# Patient Record
Sex: FEMALE | Race: BLACK OR AFRICAN AMERICAN | NOT HISPANIC OR LATINO | URBAN - METROPOLITAN AREA
[De-identification: names, ages, dates, MRNs, and addresses within clinical notes are randomized per-mention and may not be internally consistent; named-entity substitution may affect disease eponyms.]

---

## 2017-02-15 ENCOUNTER — INPATIENT (INPATIENT)
Facility: HOSPITAL | Age: 52
LOS: 1 days | Discharge: HOME | End: 2017-02-17
Attending: INTERNAL MEDICINE | Admitting: INTERNAL MEDICINE
Payer: MEDICAID

## 2017-02-15 VITALS
HEART RATE: 87 BPM | RESPIRATION RATE: 16 BRPM | TEMPERATURE: 99 F | DIASTOLIC BLOOD PRESSURE: 109 MMHG | OXYGEN SATURATION: 99 % | SYSTOLIC BLOOD PRESSURE: 200 MMHG

## 2017-02-15 DIAGNOSIS — R07.9 CHEST PAIN, UNSPECIFIED: ICD-10-CM

## 2017-02-15 DIAGNOSIS — M54.9 DORSALGIA, UNSPECIFIED: ICD-10-CM

## 2017-02-15 DIAGNOSIS — I10 ESSENTIAL (PRIMARY) HYPERTENSION: ICD-10-CM

## 2017-02-15 DIAGNOSIS — Z29.9 ENCOUNTER FOR PROPHYLACTIC MEASURES, UNSPECIFIED: ICD-10-CM

## 2017-02-15 DIAGNOSIS — E11.638 TYPE 2 DIABETES MELLITUS WITH OTHER ORAL COMPLICATIONS: ICD-10-CM

## 2017-02-15 DIAGNOSIS — E78.5 HYPERLIPIDEMIA, UNSPECIFIED: ICD-10-CM

## 2017-02-15 DIAGNOSIS — Z98.89 OTHER SPECIFIED POSTPROCEDURAL STATES: Chronic | ICD-10-CM

## 2017-02-15 LAB
ALBUMIN SERPL ELPH-MCNC: 3.5 G/DL — SIGNIFICANT CHANGE UP (ref 3.3–5)
ALP SERPL-CCNC: 82 U/L — SIGNIFICANT CHANGE UP (ref 40–120)
ALT FLD-CCNC: 25 U/L — SIGNIFICANT CHANGE UP (ref 4–33)
APPEARANCE UR: CLEAR — SIGNIFICANT CHANGE UP
AST SERPL-CCNC: 28 U/L — SIGNIFICANT CHANGE UP (ref 4–32)
BACTERIA # UR AUTO: SIGNIFICANT CHANGE UP
BASOPHILS # BLD AUTO: 0.05 K/UL — SIGNIFICANT CHANGE UP (ref 0–0.2)
BASOPHILS NFR BLD AUTO: 0.7 % — SIGNIFICANT CHANGE UP (ref 0–2)
BILIRUB SERPL-MCNC: 0.3 MG/DL — SIGNIFICANT CHANGE UP (ref 0.2–1.2)
BILIRUB UR-MCNC: NEGATIVE — SIGNIFICANT CHANGE UP
BLOOD UR QL VISUAL: NEGATIVE — SIGNIFICANT CHANGE UP
BUN SERPL-MCNC: 9 MG/DL — SIGNIFICANT CHANGE UP (ref 7–23)
CALCIUM SERPL-MCNC: 9 MG/DL — SIGNIFICANT CHANGE UP (ref 8.4–10.5)
CHLORIDE SERPL-SCNC: 98 MMOL/L — SIGNIFICANT CHANGE UP (ref 98–107)
CK MB BLD-MCNC: 1.38 NG/ML — SIGNIFICANT CHANGE UP (ref 1–4.7)
CK MB BLD-MCNC: SIGNIFICANT CHANGE UP (ref 0–2.5)
CK SERPL-CCNC: 54 U/L — SIGNIFICANT CHANGE UP (ref 25–170)
CO2 SERPL-SCNC: 23 MMOL/L — SIGNIFICANT CHANGE UP (ref 22–31)
COLOR SPEC: SIGNIFICANT CHANGE UP
CREAT SERPL-MCNC: 0.65 MG/DL — SIGNIFICANT CHANGE UP (ref 0.5–1.3)
EOSINOPHIL # BLD AUTO: 0.25 K/UL — SIGNIFICANT CHANGE UP (ref 0–0.5)
EOSINOPHIL NFR BLD AUTO: 3.3 % — SIGNIFICANT CHANGE UP (ref 0–6)
GLUCOSE SERPL-MCNC: 326 MG/DL — HIGH (ref 70–99)
GLUCOSE UR-MCNC: >1000 — SIGNIFICANT CHANGE UP
HCT VFR BLD CALC: 39.7 % — SIGNIFICANT CHANGE UP (ref 34.5–45)
HGB BLD-MCNC: 12.5 G/DL — SIGNIFICANT CHANGE UP (ref 11.5–15.5)
IMM GRANULOCYTES NFR BLD AUTO: 0.3 % — SIGNIFICANT CHANGE UP (ref 0–1.5)
KETONES UR-MCNC: NEGATIVE — SIGNIFICANT CHANGE UP
LEUKOCYTE ESTERASE UR-ACNC: HIGH
LYMPHOCYTES # BLD AUTO: 1.49 K/UL — SIGNIFICANT CHANGE UP (ref 1–3.3)
LYMPHOCYTES # BLD AUTO: 19.8 % — SIGNIFICANT CHANGE UP (ref 13–44)
MCHC RBC-ENTMCNC: 23.5 PG — LOW (ref 27–34)
MCHC RBC-ENTMCNC: 31.5 % — LOW (ref 32–36)
MCV RBC AUTO: 74.6 FL — LOW (ref 80–100)
MONOCYTES # BLD AUTO: 0.38 K/UL — SIGNIFICANT CHANGE UP (ref 0–0.9)
MONOCYTES NFR BLD AUTO: 5 % — SIGNIFICANT CHANGE UP (ref 2–14)
NEUTROPHILS # BLD AUTO: 5.35 K/UL — SIGNIFICANT CHANGE UP (ref 1.8–7.4)
NEUTROPHILS NFR BLD AUTO: 70.9 % — SIGNIFICANT CHANGE UP (ref 43–77)
NITRITE UR-MCNC: NEGATIVE — SIGNIFICANT CHANGE UP
NT-PROBNP SERPL-SCNC: 219.8 PG/ML — SIGNIFICANT CHANGE UP
PH UR: 6 — SIGNIFICANT CHANGE UP (ref 4.6–8)
PLATELET # BLD AUTO: 278 K/UL — SIGNIFICANT CHANGE UP (ref 150–400)
PMV BLD: 10.5 FL — SIGNIFICANT CHANGE UP (ref 7–13)
POTASSIUM SERPL-MCNC: 4.4 MMOL/L — SIGNIFICANT CHANGE UP (ref 3.5–5.3)
POTASSIUM SERPL-SCNC: 4.4 MMOL/L — SIGNIFICANT CHANGE UP (ref 3.5–5.3)
PROT SERPL-MCNC: 6.7 G/DL — SIGNIFICANT CHANGE UP (ref 6–8.3)
PROT UR-MCNC: NEGATIVE — SIGNIFICANT CHANGE UP
RBC # BLD: 5.32 M/UL — HIGH (ref 3.8–5.2)
RBC # FLD: 15.2 % — HIGH (ref 10.3–14.5)
RBC CASTS # UR COMP ASSIST: SIGNIFICANT CHANGE UP (ref 0–?)
SODIUM SERPL-SCNC: 136 MMOL/L — SIGNIFICANT CHANGE UP (ref 135–145)
SP GR SPEC: 1.03 — HIGH (ref 1–1.03)
SQUAMOUS # UR AUTO: SIGNIFICANT CHANGE UP
TROPONIN T SERPL-MCNC: < 0.06 NG/ML — SIGNIFICANT CHANGE UP (ref 0–0.06)
UROBILINOGEN FLD QL: NORMAL E.U. — SIGNIFICANT CHANGE UP (ref 0.1–0.2)
WBC # BLD: 7.54 K/UL — SIGNIFICANT CHANGE UP (ref 3.8–10.5)
WBC # FLD AUTO: 7.54 K/UL — SIGNIFICANT CHANGE UP (ref 3.8–10.5)
WBC UR QL: SIGNIFICANT CHANGE UP (ref 0–?)

## 2017-02-15 PROCEDURE — 71010: CPT | Mod: 26

## 2017-02-15 RX ORDER — LISINOPRIL 2.5 MG/1
40 TABLET ORAL DAILY
Qty: 0 | Refills: 0 | Status: DISCONTINUED | OUTPATIENT
Start: 2017-02-15 | End: 2017-02-17

## 2017-02-15 RX ORDER — LABETALOL HCL 100 MG
20 TABLET ORAL ONCE
Qty: 0 | Refills: 0 | Status: COMPLETED | OUTPATIENT
Start: 2017-02-15 | End: 2017-02-15

## 2017-02-15 RX ORDER — INSULIN LISPRO 100/ML
VIAL (ML) SUBCUTANEOUS AT BEDTIME
Qty: 0 | Refills: 0 | Status: DISCONTINUED | OUTPATIENT
Start: 2017-02-15 | End: 2017-02-17

## 2017-02-15 RX ORDER — DEXTROSE 50 % IN WATER 50 %
25 SYRINGE (ML) INTRAVENOUS ONCE
Qty: 0 | Refills: 0 | Status: DISCONTINUED | OUTPATIENT
Start: 2017-02-15 | End: 2017-02-17

## 2017-02-15 RX ORDER — ATORVASTATIN CALCIUM 80 MG/1
20 TABLET, FILM COATED ORAL AT BEDTIME
Qty: 0 | Refills: 0 | Status: DISCONTINUED | OUTPATIENT
Start: 2017-02-15 | End: 2017-02-17

## 2017-02-15 RX ORDER — INSULIN GLARGINE 100 [IU]/ML
16 INJECTION, SOLUTION SUBCUTANEOUS AT BEDTIME
Qty: 0 | Refills: 0 | Status: DISCONTINUED | OUTPATIENT
Start: 2017-02-15 | End: 2017-02-17

## 2017-02-15 RX ORDER — LABETALOL HCL 100 MG
600 TABLET ORAL
Qty: 0 | Refills: 0 | Status: DISCONTINUED | OUTPATIENT
Start: 2017-02-15 | End: 2017-02-16

## 2017-02-15 RX ORDER — HYDRALAZINE HCL 50 MG
10 TABLET ORAL ONCE
Qty: 0 | Refills: 0 | Status: COMPLETED | OUTPATIENT
Start: 2017-02-15 | End: 2017-02-15

## 2017-02-15 RX ORDER — SODIUM CHLORIDE 9 MG/ML
1000 INJECTION, SOLUTION INTRAVENOUS
Qty: 0 | Refills: 0 | Status: DISCONTINUED | OUTPATIENT
Start: 2017-02-15 | End: 2017-02-17

## 2017-02-15 RX ORDER — ASPIRIN/CALCIUM CARB/MAGNESIUM 324 MG
81 TABLET ORAL DAILY
Qty: 0 | Refills: 0 | Status: DISCONTINUED | OUTPATIENT
Start: 2017-02-15 | End: 2017-02-17

## 2017-02-15 RX ORDER — ENOXAPARIN SODIUM 100 MG/ML
40 INJECTION SUBCUTANEOUS EVERY 24 HOURS
Qty: 0 | Refills: 0 | Status: DISCONTINUED | OUTPATIENT
Start: 2017-02-15 | End: 2017-02-17

## 2017-02-15 RX ORDER — DEXTROSE 50 % IN WATER 50 %
12.5 SYRINGE (ML) INTRAVENOUS ONCE
Qty: 0 | Refills: 0 | Status: DISCONTINUED | OUTPATIENT
Start: 2017-02-15 | End: 2017-02-17

## 2017-02-15 RX ORDER — INSULIN LISPRO 100/ML
VIAL (ML) SUBCUTANEOUS
Qty: 0 | Refills: 0 | Status: DISCONTINUED | OUTPATIENT
Start: 2017-02-15 | End: 2017-02-17

## 2017-02-15 RX ORDER — HYDRALAZINE HCL 50 MG
100 TABLET ORAL THREE TIMES A DAY
Qty: 0 | Refills: 0 | Status: DISCONTINUED | OUTPATIENT
Start: 2017-02-15 | End: 2017-02-17

## 2017-02-15 RX ORDER — GLUCAGON INJECTION, SOLUTION 0.5 MG/.1ML
1 INJECTION, SOLUTION SUBCUTANEOUS ONCE
Qty: 0 | Refills: 0 | Status: DISCONTINUED | OUTPATIENT
Start: 2017-02-15 | End: 2017-02-17

## 2017-02-15 RX ORDER — ACETAMINOPHEN 500 MG
650 TABLET ORAL EVERY 6 HOURS
Qty: 0 | Refills: 0 | Status: DISCONTINUED | OUTPATIENT
Start: 2017-02-15 | End: 2017-02-17

## 2017-02-15 RX ORDER — DEXTROSE 50 % IN WATER 50 %
1 SYRINGE (ML) INTRAVENOUS ONCE
Qty: 0 | Refills: 0 | Status: DISCONTINUED | OUTPATIENT
Start: 2017-02-15 | End: 2017-02-17

## 2017-02-15 RX ADMIN — Medication 20 MILLIGRAM(S): at 19:51

## 2017-02-15 RX ADMIN — Medication 600 MILLIGRAM(S): at 23:03

## 2017-02-15 RX ADMIN — ATORVASTATIN CALCIUM 20 MILLIGRAM(S): 80 TABLET, FILM COATED ORAL at 23:02

## 2017-02-15 RX ADMIN — Medication 0.1 MILLIGRAM(S): at 23:02

## 2017-02-15 RX ADMIN — Medication 10 MILLIGRAM(S): at 20:57

## 2017-02-15 RX ADMIN — INSULIN GLARGINE 16 UNIT(S): 100 INJECTION, SOLUTION SUBCUTANEOUS at 23:22

## 2017-02-15 RX ADMIN — Medication 20 MILLIGRAM(S): at 20:30

## 2017-02-15 RX ADMIN — LISINOPRIL 40 MILLIGRAM(S): 2.5 TABLET ORAL at 23:02

## 2017-02-15 RX ADMIN — Medication 81 MILLIGRAM(S): at 23:07

## 2017-02-15 RX ADMIN — Medication 100 MILLIGRAM(S): at 23:02

## 2017-02-15 RX ADMIN — ENOXAPARIN SODIUM 40 MILLIGRAM(S): 100 INJECTION SUBCUTANEOUS at 23:03

## 2017-02-15 NOTE — H&P ADULT. - ATTENDING COMMENTS
Pt seen and examined at bedside. Agree with assessment and plan as above  Admitted with hypertensive emergency  BP control with home meds and PRN IV  Tele monitoring   Insulin   DVT ppx

## 2017-02-15 NOTE — ED PROVIDER NOTE - ATTENDING CONTRIBUTION TO CARE
DR Bloch- Patient sent in due to high BP, had CP yesterday, c/o some dyspnea on exertion, , /126- HEENT nml 2-12 intact,heart sounds nml, lungs clear, mildy obese, no abd tenderness, no edema- BP control

## 2017-02-15 NOTE — ED PROVIDER NOTE - MEDICAL DECISION MAKING DETAILS
50 y/o female with HTN and HLD here with worse HTN. Consider ACS vs HTN emergency vs Systolic/Disatolic BP. Plan CBC, CMP, CXR, EKG, Labetalol IV, Reassess. Admit

## 2017-02-15 NOTE — ED PROVIDER NOTE - FAMILY HISTORY
<<-----Click on this checkbox to enter Family History Family history of coronary artery disease     Family history of MI (myocardial infarction)     Sibling  Still living? Unknown  Family history of hypertension, Age at diagnosis: Age Unknown  Family history of diabetes mellitus, Age at diagnosis: Age Unknown

## 2017-02-15 NOTE — H&P ADULT. - NEGATIVE NEUROLOGICAL SYMPTOMS
no focal seizures/no syncope/no weakness/no paresthesias/no transient paralysis/no generalized seizures

## 2017-02-15 NOTE — H&P ADULT. - HISTORY OF PRESENT ILLNESS
51F obese with a history of HTN, Dyslipidemia, DM2 saw her PCP, Dr Rivera on 2/15,and was found to have a BP = 207/116. The pt has experienced SANDERSON for he past few weeks.  Nonexertional, intermittent chest tightness to the L side.  Denies fever, cough, HA, visual changes, LE edema, trauma, abd pain. She denies dietary indiscretions and has been compliant with her medications.     In the Ed, she was given a total of Labetalol 40 mg IV and her recent BP = 226/ 118, HR = 79b/ min ,RR= 18b/ min   She was given Hydralazine 10 mg IV X 1

## 2017-02-15 NOTE — ED PROVIDER NOTE - OBJECTIVE STATEMENT
50 y/o female with PMH of HTN, HLD and DM here with worse high BP. Patient saw Dr Rivera today and he sent her here due to SBP of 210 in the office. Patient also reports SANDERSON for he past few weeks. Endorses CP yesterday on the L side. Denies fever, cough, worse CP with exertion, HA, visual changes, LE edema, trauma, abd pain or hx of DVT.

## 2017-02-15 NOTE — H&P ADULT. - NEGATIVE ENMT SYMPTOMS
no nose bleeds/no vertigo/no sinus symptoms/no gum bleeding/no abnormal taste sensation/no hearing difficulty/no tinnitus/no ear pain

## 2017-02-15 NOTE — ED ADULT NURSE NOTE - OBJECTIVE STATEMENT
elevated BP from cardiology visit today.  Mild pressure on exertion reported.  Repeat /101.  Pt denies headache, vision change, dizziness, chest pain.   IV accessed.  Labs sent.  Waiting for MD lloyd.

## 2017-02-15 NOTE — ED ADULT TRIAGE NOTE - CHIEF COMPLAINT QUOTE
Pt sent from cardiologist due to high blood pressure 207/116. Pt reports chest pain yesterday and SANDERSON. Resp even and unlabored. Pt she did not take her meds during January due to an insurance issue, but restarted 2/5, denies dizziness or SOB at rest.

## 2017-02-16 ENCOUNTER — TRANSCRIPTION ENCOUNTER (OUTPATIENT)
Age: 52
End: 2017-02-16

## 2017-02-16 LAB
BUN SERPL-MCNC: 10 MG/DL — SIGNIFICANT CHANGE UP (ref 7–23)
CALCIUM SERPL-MCNC: 9 MG/DL — SIGNIFICANT CHANGE UP (ref 8.4–10.5)
CHLORIDE SERPL-SCNC: 101 MMOL/L — SIGNIFICANT CHANGE UP (ref 98–107)
CHOLEST SERPL-MCNC: 178 MG/DL — SIGNIFICANT CHANGE UP (ref 120–199)
CK SERPL-CCNC: 35 U/L — SIGNIFICANT CHANGE UP (ref 25–170)
CO2 SERPL-SCNC: 24 MMOL/L — SIGNIFICANT CHANGE UP (ref 22–31)
CREAT SERPL-MCNC: 0.57 MG/DL — SIGNIFICANT CHANGE UP (ref 0.5–1.3)
GLUCOSE SERPL-MCNC: 218 MG/DL — HIGH (ref 70–99)
HBA1C BLD-MCNC: 10.7 % — HIGH (ref 4–5.6)
HCG SERPL-ACNC: < 5 MIU/ML — SIGNIFICANT CHANGE UP
HCT VFR BLD CALC: 38.9 % — SIGNIFICANT CHANGE UP (ref 34.5–45)
HDLC SERPL-MCNC: 44 MG/DL — LOW (ref 45–65)
HGB BLD-MCNC: 12.3 G/DL — SIGNIFICANT CHANGE UP (ref 11.5–15.5)
LIPID PNL WITH DIRECT LDL SERPL: 119 MG/DL — SIGNIFICANT CHANGE UP
MCHC RBC-ENTMCNC: 23.7 PG — LOW (ref 27–34)
MCHC RBC-ENTMCNC: 31.6 % — LOW (ref 32–36)
MCV RBC AUTO: 74.8 FL — LOW (ref 80–100)
PLATELET # BLD AUTO: 287 K/UL — SIGNIFICANT CHANGE UP (ref 150–400)
PMV BLD: 10.7 FL — SIGNIFICANT CHANGE UP (ref 7–13)
POTASSIUM SERPL-MCNC: 4.1 MMOL/L — SIGNIFICANT CHANGE UP (ref 3.5–5.3)
POTASSIUM SERPL-SCNC: 4.1 MMOL/L — SIGNIFICANT CHANGE UP (ref 3.5–5.3)
RBC # BLD: 5.2 M/UL — SIGNIFICANT CHANGE UP (ref 3.8–5.2)
RBC # FLD: 15.6 % — HIGH (ref 10.3–14.5)
SODIUM SERPL-SCNC: 138 MMOL/L — SIGNIFICANT CHANGE UP (ref 135–145)
TRIGL SERPL-MCNC: 124 MG/DL — SIGNIFICANT CHANGE UP (ref 10–149)
TROPONIN T SERPL-MCNC: < 0.06 NG/ML — SIGNIFICANT CHANGE UP (ref 0–0.06)
TSH SERPL-MCNC: 1.88 UIU/ML — SIGNIFICANT CHANGE UP (ref 0.27–4.2)
WBC # BLD: 6.96 K/UL — SIGNIFICANT CHANGE UP (ref 3.8–10.5)
WBC # FLD AUTO: 6.96 K/UL — SIGNIFICANT CHANGE UP (ref 3.8–10.5)

## 2017-02-16 RX ORDER — LABETALOL HCL 100 MG
600 TABLET ORAL THREE TIMES A DAY
Qty: 0 | Refills: 0 | Status: DISCONTINUED | OUTPATIENT
Start: 2017-02-16 | End: 2017-02-17

## 2017-02-16 RX ORDER — LABETALOL HCL 100 MG
600 TABLET ORAL THREE TIMES A DAY
Qty: 0 | Refills: 0 | Status: DISCONTINUED | OUTPATIENT
Start: 2017-02-16 | End: 2017-02-16

## 2017-02-16 RX ADMIN — Medication 100 MILLIGRAM(S): at 22:24

## 2017-02-16 RX ADMIN — INSULIN GLARGINE 16 UNIT(S): 100 INJECTION, SOLUTION SUBCUTANEOUS at 22:27

## 2017-02-16 RX ADMIN — Medication 600 MILLIGRAM(S): at 15:11

## 2017-02-16 RX ADMIN — Medication 81 MILLIGRAM(S): at 15:11

## 2017-02-16 RX ADMIN — ATORVASTATIN CALCIUM 20 MILLIGRAM(S): 80 TABLET, FILM COATED ORAL at 22:24

## 2017-02-16 RX ADMIN — ENOXAPARIN SODIUM 40 MILLIGRAM(S): 100 INJECTION SUBCUTANEOUS at 22:24

## 2017-02-16 RX ADMIN — Medication 0.2 MILLIGRAM(S): at 17:25

## 2017-02-16 RX ADMIN — Medication 600 MILLIGRAM(S): at 22:24

## 2017-02-16 RX ADMIN — Medication 0.1 MILLIGRAM(S): at 06:15

## 2017-02-16 RX ADMIN — Medication: at 14:20

## 2017-02-16 RX ADMIN — Medication 100 MILLIGRAM(S): at 15:11

## 2017-02-16 RX ADMIN — LISINOPRIL 40 MILLIGRAM(S): 2.5 TABLET ORAL at 06:15

## 2017-02-16 RX ADMIN — Medication 600 MILLIGRAM(S): at 06:15

## 2017-02-16 RX ADMIN — Medication: at 17:26

## 2017-02-16 RX ADMIN — Medication 100 MILLIGRAM(S): at 06:15

## 2017-02-17 VITALS
OXYGEN SATURATION: 100 % | DIASTOLIC BLOOD PRESSURE: 82 MMHG | RESPIRATION RATE: 18 BRPM | HEART RATE: 63 BPM | SYSTOLIC BLOOD PRESSURE: 158 MMHG

## 2017-02-17 LAB
BASOPHILS # BLD AUTO: 0.03 K/UL — SIGNIFICANT CHANGE UP (ref 0–0.2)
BASOPHILS NFR BLD AUTO: 0.4 % — SIGNIFICANT CHANGE UP (ref 0–2)
BUN SERPL-MCNC: 9 MG/DL — SIGNIFICANT CHANGE UP (ref 7–23)
CALCIUM SERPL-MCNC: 8.6 MG/DL — SIGNIFICANT CHANGE UP (ref 8.4–10.5)
CHLORIDE SERPL-SCNC: 101 MMOL/L — SIGNIFICANT CHANGE UP (ref 98–107)
CO2 SERPL-SCNC: 20 MMOL/L — LOW (ref 22–31)
CREAT SERPL-MCNC: 0.64 MG/DL — SIGNIFICANT CHANGE UP (ref 0.5–1.3)
EOSINOPHIL # BLD AUTO: 0.27 K/UL — SIGNIFICANT CHANGE UP (ref 0–0.5)
EOSINOPHIL NFR BLD AUTO: 3.6 % — SIGNIFICANT CHANGE UP (ref 0–6)
GLUCOSE SERPL-MCNC: 154 MG/DL — HIGH (ref 70–99)
HCT VFR BLD CALC: 37.6 % — SIGNIFICANT CHANGE UP (ref 34.5–45)
HGB BLD-MCNC: 11.7 G/DL — SIGNIFICANT CHANGE UP (ref 11.5–15.5)
IMM GRANULOCYTES NFR BLD AUTO: 0.1 % — SIGNIFICANT CHANGE UP (ref 0–1.5)
LYMPHOCYTES # BLD AUTO: 1.81 K/UL — SIGNIFICANT CHANGE UP (ref 1–3.3)
LYMPHOCYTES # BLD AUTO: 24.4 % — SIGNIFICANT CHANGE UP (ref 13–44)
MAGNESIUM SERPL-MCNC: 1.7 MG/DL — SIGNIFICANT CHANGE UP (ref 1.6–2.6)
MCHC RBC-ENTMCNC: 23.1 PG — LOW (ref 27–34)
MCHC RBC-ENTMCNC: 31.1 % — LOW (ref 32–36)
MCV RBC AUTO: 74.2 FL — LOW (ref 80–100)
MONOCYTES # BLD AUTO: 0.57 K/UL — SIGNIFICANT CHANGE UP (ref 0–0.9)
MONOCYTES NFR BLD AUTO: 7.7 % — SIGNIFICANT CHANGE UP (ref 2–14)
NEUTROPHILS # BLD AUTO: 4.72 K/UL — SIGNIFICANT CHANGE UP (ref 1.8–7.4)
NEUTROPHILS NFR BLD AUTO: 63.8 % — SIGNIFICANT CHANGE UP (ref 43–77)
PLATELET # BLD AUTO: 256 K/UL — SIGNIFICANT CHANGE UP (ref 150–400)
PMV BLD: 10.1 FL — SIGNIFICANT CHANGE UP (ref 7–13)
POTASSIUM SERPL-MCNC: 4 MMOL/L — SIGNIFICANT CHANGE UP (ref 3.5–5.3)
POTASSIUM SERPL-SCNC: 4 MMOL/L — SIGNIFICANT CHANGE UP (ref 3.5–5.3)
RBC # BLD: 5.07 M/UL — SIGNIFICANT CHANGE UP (ref 3.8–5.2)
RBC # FLD: 15.6 % — HIGH (ref 10.3–14.5)
SODIUM SERPL-SCNC: 136 MMOL/L — SIGNIFICANT CHANGE UP (ref 135–145)
WBC # BLD: 7.41 K/UL — SIGNIFICANT CHANGE UP (ref 3.8–10.5)
WBC # FLD AUTO: 7.41 K/UL — SIGNIFICANT CHANGE UP (ref 3.8–10.5)

## 2017-02-17 RX ORDER — HYDRALAZINE HCL 50 MG
1 TABLET ORAL
Qty: 0 | Refills: 0 | COMMUNITY
Start: 2017-02-17

## 2017-02-17 RX ORDER — ASPIRIN/CALCIUM CARB/MAGNESIUM 324 MG
1 TABLET ORAL
Qty: 0 | Refills: 0 | COMMUNITY
Start: 2017-02-17

## 2017-02-17 RX ORDER — ATORVASTATIN CALCIUM 80 MG/1
1 TABLET, FILM COATED ORAL
Qty: 0 | Refills: 0 | COMMUNITY
Start: 2017-02-17

## 2017-02-17 RX ORDER — LABETALOL HCL 100 MG
2 TABLET ORAL
Qty: 0 | Refills: 0 | COMMUNITY
Start: 2017-02-17

## 2017-02-17 RX ORDER — LABETALOL HCL 100 MG
600 TABLET ORAL
Qty: 0 | Refills: 0 | Status: DISCONTINUED | OUTPATIENT
Start: 2017-02-17 | End: 2017-02-17

## 2017-02-17 RX ORDER — LISINOPRIL 2.5 MG/1
1 TABLET ORAL
Qty: 0 | Refills: 0 | COMMUNITY
Start: 2017-02-17

## 2017-02-17 RX ORDER — ACETAMINOPHEN 500 MG
2 TABLET ORAL
Qty: 0 | Refills: 0 | COMMUNITY
Start: 2017-02-17

## 2017-02-17 RX ADMIN — Medication 0.2 MILLIGRAM(S): at 06:45

## 2017-02-17 RX ADMIN — Medication 600 MILLIGRAM(S): at 06:46

## 2017-02-17 RX ADMIN — Medication 81 MILLIGRAM(S): at 13:53

## 2017-02-17 RX ADMIN — Medication 100 MILLIGRAM(S): at 13:53

## 2017-02-17 RX ADMIN — Medication 4: at 12:55

## 2017-02-17 RX ADMIN — Medication: at 09:16

## 2017-02-17 RX ADMIN — LISINOPRIL 40 MILLIGRAM(S): 2.5 TABLET ORAL at 06:46

## 2017-02-17 RX ADMIN — Medication 100 MILLIGRAM(S): at 06:46

## 2017-02-17 NOTE — DISCHARGE NOTE ADULT - PLAN OF CARE
Reduce blood pressure. Continue Lisinopril, Hydralazine, Clonidine, Labetalol.   Please monitor your blood pressure.   Follow-up with Dr. Rivera within 1 week. Reduce lipid panel. Continue Lipitor. Reduce blood glucose. Continue Frankie Biswas.   Monitor your fingersticks.

## 2017-02-17 NOTE — DISCHARGE NOTE ADULT - CARE PROVIDER_API CALL
Irvin Rivera (), Cardiology; Internal Medicine  [address] [place] [address] [address]  [place] [address]  Phone: [phone]  Fax: [phone]

## 2017-02-17 NOTE — DISCHARGE NOTE ADULT - CARE PLAN
Principal Discharge DX:	HTN (hypertension)  Goal:	Reduce blood pressure.  Instructions for follow-up, activity and diet:	Continue Lisinopril, Hydralazine, Clonidine, Labetalol.   Please monitor your blood pressure.   Follow-up with Dr. Rivera within 1 week.  Secondary Diagnosis:	Hyperlipemia  Goal:	Reduce lipid panel.  Instructions for follow-up, activity and diet:	Continue Lipitor.  Secondary Diagnosis:	DM (diabetes mellitus)  Goal:	Reduce blood glucose.  Instructions for follow-up, activity and diet:	Continue Lantus, Janumet.   Monitor your fingersticks.

## 2017-02-17 NOTE — DISCHARGE NOTE ADULT - MEDICATION SUMMARY - MEDICATIONS TO TAKE
I will START or STAY ON the medications listed below when I get home from the hospital:    acetaminophen 325 mg oral tablet  -- 2 tab(s) by mouth every 6 hours, As needed, mild to moderate pain  -- Indication: For Pain    aspirin 81 mg oral tablet, chewable  -- 1 tab(s) by mouth once a day  -- Indication: For Heart     lisinopril 40 mg oral tablet  -- 1 tab(s) by mouth once a day  -- Indication: For HTN    cloNIDine 0.2 mg oral tablet  -- 1 tab(s) by mouth 2 times a day  -- Indication: For HTN    Janumet 1000 mg-50 mg oral tablet  -- 1 tab(s) by mouth 2 times a day  -- Do not drink alcoholic beverages when taking this medication.  Take with food or milk.    -- Indication: For Diabetes     Lantus Solostar Pen 100 units/mL subcutaneous solution  -- 16 unit(s) subcutaneous once a day (at bedtime) MDD:dispense 1 box  -- Do not drink alcoholic beverages when taking this medication.  It is very important that you take or use this exactly as directed.  Do not skip doses or discontinue unless directed by your doctor.  Keep in refrigerator.  Do not freeze.    -- Indication: For Diabetes     atorvastatin 20 mg oral tablet  -- 1 tab(s) by mouth once a day (at bedtime)  -- Indication: For Hyperlipidemia    labetalol 300 mg oral tablet  -- 2 tab(s) by mouth 2 times a day  -- Indication: For HTN    hydrALAZINE 100 mg oral tablet  -- 1 tab(s) by mouth 3 times a day  -- Indication: For HTN I will START or STAY ON the medications listed below when I get home from the hospital:    acetaminophen 325 mg oral tablet  -- 2 tab(s) by mouth every 6 hours, As needed, mild to moderate pain  -- Indication: For Pain    aspirin 81 mg oral tablet, chewable  -- 1 tab(s) by mouth once a day  -- Indication: For Heart     lisinopril 40 mg oral tablet  -- 1 tab(s) by mouth once a day  -- Indication: For HTN    cloNIDine 0.2 mg oral tablet  -- 1 tab(s) by mouth 2 times a day  -- Indication: For HTN (hypertension)    Janumet 1000 mg-50 mg oral tablet  -- 1 tab(s) by mouth 2 times a day  -- Do not drink alcoholic beverages when taking this medication.  Take with food or milk.    -- Indication: For Diabetes     Lantus Solostar Pen 100 units/mL subcutaneous solution  -- 16 unit(s) subcutaneous once a day (at bedtime) MDD:dispense 1 box  -- Do not drink alcoholic beverages when taking this medication.  It is very important that you take or use this exactly as directed.  Do not skip doses or discontinue unless directed by your doctor.  Keep in refrigerator.  Do not freeze.    -- Indication: For Diabetes     atorvastatin 20 mg oral tablet  -- 1 tab(s) by mouth once a day (at bedtime)  -- Indication: For Hyperlipidemia    labetalol 300 mg oral tablet  -- 2 tab(s) by mouth 2 times a day  -- Indication: For HTN    hydrALAZINE 100 mg oral tablet  -- 1 tab(s) by mouth 3 times a day  -- Indication: For HTN

## 2017-02-17 NOTE — DISCHARGE NOTE ADULT - INSTRUCTIONS
Low salt, Low cholesterol, Low fat, diabetic diet. pt to report any unusual signs of dizziness, pain, swelling, etc to MD/911

## 2017-02-17 NOTE — DISCHARGE NOTE ADULT - MEDICATION SUMMARY - MEDICATIONS TO CHANGE
I will SWITCH the dose or number of times a day I take the medications listed below when I get home from the hospital:    cloNIDine 0.1 mg oral tablet  -- 1 tab(s) by mouth 2 times a day

## 2017-02-17 NOTE — DISCHARGE NOTE ADULT - PATIENT PORTAL LINK FT
“You can access the [facility] Patient Portal, offered by [facility], by registering with the following website: [email or web address]”

## 2017-02-17 NOTE — DISCHARGE NOTE ADULT - OTHER SIGNIFICANT FINDINGS
(Admit Diagnosis) Chest pain  (PMH) Sarcoidosis  (PMH) DM (diabetes mellitus)  (PMH) HTN (hypertension)  (PMH) Chronic back pain  (PMH) Hyperlipemia  ((PSH) H/O hand surgery

## 2017-02-17 NOTE — DISCHARGE NOTE ADULT - HOSPITAL COURSE
51F obese with a history of HTN, Dyslipidemia, DM2 saw her PCP, Dr Rivera on 2/15,and was found to have a BP = 207/116. The pt had experienced SANDERSON for the past few weeks.  Nonexertional, intermittent chest tightness to the left side.  Denied fever, cough, HA, visual changes, LE edema, trauma, abd pain. She denied dietary indiscretions and had been compliant with her medications. In the ED, she was given a total of Labetalol 40 mg IV and her remained elevated BP = 226/ 118. She was given Hydralazine 10 mg IV X 1 and admitted for further management and observation.  Patient was treated with Labetalol, clonidine, Hydralazine and Lisinopril until the BP was better controlled.  Once patient was deemed to be stable she was discharged home. 51F obese with a history of HTN, Dyslipidemia, DM2 saw her PCP, Dr Rivera on 2/15,and was found to have a BP = 207/116. The pt had experienced SNADERSON for the past few weeks.  Nonexertional, intermittent chest tightness to the left side.  Denied fever, cough, HA, visual changes, LE edema, trauma, abd pain. She denied dietary indiscretions and had been compliant with her medications. In the ED, she was given a total of Labetalol 40 mg IV and her remained elevated BP = 226/ 118. She was given Hydralazine 10 mg IV X 1 and admitted for further management and observation.  Patient was treated with Labetalol, clonidine, Hydralazine and Lisinopril until the BP was better controlled.  Once patient was deemed to be stable she was discharged home.    2/17/17 Pt is medically stable for discharge home today as per Dr. Rivera.

## 2017-04-07 ENCOUNTER — APPOINTMENT (OUTPATIENT)
Dept: SURGERY | Facility: CLINIC | Age: 52
End: 2017-04-07

## 2017-04-07 VITALS
SYSTOLIC BLOOD PRESSURE: 219 MMHG | DIASTOLIC BLOOD PRESSURE: 120 MMHG | RESPIRATION RATE: 16 BRPM | BODY MASS INDEX: 39.86 KG/M2 | OXYGEN SATURATION: 97 % | WEIGHT: 248 LBS | HEIGHT: 66 IN | HEART RATE: 76 BPM | TEMPERATURE: 98.3 F

## 2017-07-07 ENCOUNTER — APPOINTMENT (OUTPATIENT)
Dept: NEUROSURGERY | Facility: CLINIC | Age: 52
End: 2017-07-07

## 2017-07-07 VITALS
WEIGHT: 246 LBS | HEART RATE: 83 BPM | BODY MASS INDEX: 39.53 KG/M2 | SYSTOLIC BLOOD PRESSURE: 186 MMHG | DIASTOLIC BLOOD PRESSURE: 95 MMHG | HEIGHT: 66 IN

## 2017-07-17 ENCOUNTER — TRANSCRIPTION ENCOUNTER (OUTPATIENT)
Age: 52
End: 2017-07-17

## 2017-07-17 ENCOUNTER — APPOINTMENT (OUTPATIENT)
Dept: NEUROSURGERY | Facility: CLINIC | Age: 52
End: 2017-07-17

## 2017-07-17 ENCOUNTER — OUTPATIENT (OUTPATIENT)
Dept: OUTPATIENT SERVICES | Facility: HOSPITAL | Age: 52
LOS: 1 days | End: 2017-07-17
Payer: MEDICAID

## 2017-07-17 DIAGNOSIS — M54.12 RADICULOPATHY, CERVICAL REGION: ICD-10-CM

## 2017-07-17 DIAGNOSIS — E11.65 TYPE 2 DIABETES MELLITUS WITH HYPERGLYCEMIA: ICD-10-CM

## 2017-07-17 DIAGNOSIS — M54.13 RADICULOPATHY, CERVICOTHORACIC REGION: ICD-10-CM

## 2017-07-17 DIAGNOSIS — Z98.89 OTHER SPECIFIED POSTPROCEDURAL STATES: Chronic | ICD-10-CM

## 2017-07-17 PROCEDURE — 62321 NJX INTERLAMINAR CRV/THRC: CPT

## 2017-07-17 PROCEDURE — 82962 GLUCOSE BLOOD TEST: CPT

## 2017-08-01 ENCOUNTER — APPOINTMENT (OUTPATIENT)
Dept: NEUROSURGERY | Facility: CLINIC | Age: 52
End: 2017-08-01
Payer: MEDICAID

## 2017-08-01 VITALS
DIASTOLIC BLOOD PRESSURE: 90 MMHG | BODY MASS INDEX: 39.53 KG/M2 | WEIGHT: 246 LBS | HEART RATE: 82 BPM | SYSTOLIC BLOOD PRESSURE: 150 MMHG | HEIGHT: 66 IN

## 2017-08-01 DIAGNOSIS — M50.20 OTHER CERVICAL DISC DISPLACEMENT, UNSPECIFIED CERVICAL REGION: ICD-10-CM

## 2017-08-01 DIAGNOSIS — M54.2 CERVICALGIA: ICD-10-CM

## 2017-08-01 PROCEDURE — 99213 OFFICE O/P EST LOW 20 MIN: CPT

## 2017-09-14 ENCOUNTER — RESULT REVIEW (OUTPATIENT)
Age: 52
End: 2017-09-14

## 2017-10-03 ENCOUNTER — INPATIENT (INPATIENT)
Facility: HOSPITAL | Age: 52
LOS: 6 days | Discharge: HOME | End: 2017-10-10
Attending: INTERNAL MEDICINE | Admitting: INTERNAL MEDICINE
Payer: MEDICAID

## 2017-10-03 VITALS
TEMPERATURE: 98 F | HEART RATE: 75 BPM | RESPIRATION RATE: 18 BRPM | OXYGEN SATURATION: 98 % | SYSTOLIC BLOOD PRESSURE: 147 MMHG | DIASTOLIC BLOOD PRESSURE: 99 MMHG

## 2017-10-03 DIAGNOSIS — Z98.89 OTHER SPECIFIED POSTPROCEDURAL STATES: Chronic | ICD-10-CM

## 2017-10-03 DIAGNOSIS — E11.9 TYPE 2 DIABETES MELLITUS WITHOUT COMPLICATIONS: ICD-10-CM

## 2017-10-03 DIAGNOSIS — E78.5 HYPERLIPIDEMIA, UNSPECIFIED: ICD-10-CM

## 2017-10-03 DIAGNOSIS — I10 ESSENTIAL (PRIMARY) HYPERTENSION: ICD-10-CM

## 2017-10-03 DIAGNOSIS — Z29.9 ENCOUNTER FOR PROPHYLACTIC MEASURES, UNSPECIFIED: ICD-10-CM

## 2017-10-03 DIAGNOSIS — R53.1 WEAKNESS: ICD-10-CM

## 2017-10-03 DIAGNOSIS — R07.9 CHEST PAIN, UNSPECIFIED: ICD-10-CM

## 2017-10-03 DIAGNOSIS — R41.82 ALTERED MENTAL STATUS, UNSPECIFIED: ICD-10-CM

## 2017-10-03 DIAGNOSIS — I63.9 CEREBRAL INFARCTION, UNSPECIFIED: ICD-10-CM

## 2017-10-03 LAB
ALBUMIN SERPL ELPH-MCNC: 3.6 G/DL — SIGNIFICANT CHANGE UP (ref 3.3–5)
ALP SERPL-CCNC: 61 U/L — SIGNIFICANT CHANGE UP (ref 40–120)
ALT FLD-CCNC: 13 U/L — SIGNIFICANT CHANGE UP (ref 4–33)
APTT BLD: 28.5 SEC — SIGNIFICANT CHANGE UP (ref 27.5–37.4)
AST SERPL-CCNC: 14 U/L — SIGNIFICANT CHANGE UP (ref 4–32)
BASE EXCESS BLDV CALC-SCNC: 2.6 MMOL/L — SIGNIFICANT CHANGE UP
BASOPHILS # BLD AUTO: 0.04 K/UL — SIGNIFICANT CHANGE UP (ref 0–0.2)
BASOPHILS NFR BLD AUTO: 0.6 % — SIGNIFICANT CHANGE UP (ref 0–2)
BILIRUB SERPL-MCNC: 0.2 MG/DL — SIGNIFICANT CHANGE UP (ref 0.2–1.2)
BLD GP AB SCN SERPL QL: NEGATIVE — SIGNIFICANT CHANGE UP
BLOOD GAS VENOUS - CREATININE: 0.58 MG/DL — SIGNIFICANT CHANGE UP (ref 0.5–1.3)
BUN SERPL-MCNC: 10 MG/DL — SIGNIFICANT CHANGE UP (ref 7–23)
CALCIUM SERPL-MCNC: 8.5 MG/DL — SIGNIFICANT CHANGE UP (ref 8.4–10.5)
CHLORIDE BLDV-SCNC: 104 MMOL/L — SIGNIFICANT CHANGE UP (ref 96–108)
CHLORIDE SERPL-SCNC: 105 MMOL/L — SIGNIFICANT CHANGE UP (ref 98–107)
CHOLEST SERPL-MCNC: 162 MG/DL — SIGNIFICANT CHANGE UP (ref 120–199)
CK MB BLD-MCNC: 1.39 NG/ML — SIGNIFICANT CHANGE UP (ref 1–4.7)
CK MB BLD-MCNC: 1.52 NG/ML — SIGNIFICANT CHANGE UP (ref 1–4.7)
CK MB BLD-MCNC: SIGNIFICANT CHANGE UP (ref 0–2.5)
CK SERPL-CCNC: 59 U/L — SIGNIFICANT CHANGE UP (ref 25–170)
CK SERPL-CCNC: 72 U/L — SIGNIFICANT CHANGE UP (ref 25–170)
CO2 SERPL-SCNC: 28 MMOL/L — SIGNIFICANT CHANGE UP (ref 22–31)
CREAT SERPL-MCNC: 0.68 MG/DL — SIGNIFICANT CHANGE UP (ref 0.5–1.3)
EOSINOPHIL # BLD AUTO: 0.18 K/UL — SIGNIFICANT CHANGE UP (ref 0–0.5)
EOSINOPHIL NFR BLD AUTO: 2.8 % — SIGNIFICANT CHANGE UP (ref 0–6)
GAS PNL BLDV: 137 MMOL/L — SIGNIFICANT CHANGE UP (ref 136–146)
GLUCOSE BLDV-MCNC: 220 — HIGH (ref 70–99)
GLUCOSE SERPL-MCNC: 219 MG/DL — HIGH (ref 70–99)
HCO3 BLDV-SCNC: 25 MMOL/L — SIGNIFICANT CHANGE UP (ref 20–27)
HCT VFR BLD CALC: 37.2 % — SIGNIFICANT CHANGE UP (ref 34.5–45)
HCT VFR BLDV CALC: 35.4 % — SIGNIFICANT CHANGE UP (ref 34.5–45)
HDLC SERPL-MCNC: 49 MG/DL — SIGNIFICANT CHANGE UP (ref 45–65)
HGB BLD-MCNC: 11.2 G/DL — LOW (ref 11.5–15.5)
HGB BLDV-MCNC: 11.5 G/DL — SIGNIFICANT CHANGE UP (ref 11.5–15.5)
IMM GRANULOCYTES # BLD AUTO: 0.03 # — SIGNIFICANT CHANGE UP
IMM GRANULOCYTES NFR BLD AUTO: 0.5 % — SIGNIFICANT CHANGE UP (ref 0–1.5)
INR BLD: 0.97 — SIGNIFICANT CHANGE UP (ref 0.88–1.17)
LACTATE BLDV-MCNC: 1.9 MMOL/L — SIGNIFICANT CHANGE UP (ref 0.5–2)
LIPID PNL WITH DIRECT LDL SERPL: 102 MG/DL — SIGNIFICANT CHANGE UP
LYMPHOCYTES # BLD AUTO: 1.24 K/UL — SIGNIFICANT CHANGE UP (ref 1–3.3)
LYMPHOCYTES # BLD AUTO: 19.3 % — SIGNIFICANT CHANGE UP (ref 13–44)
MCHC RBC-ENTMCNC: 23.7 PG — LOW (ref 27–34)
MCHC RBC-ENTMCNC: 30.1 % — LOW (ref 32–36)
MCV RBC AUTO: 78.6 FL — LOW (ref 80–100)
MONOCYTES # BLD AUTO: 0.48 K/UL — SIGNIFICANT CHANGE UP (ref 0–0.9)
MONOCYTES NFR BLD AUTO: 7.5 % — SIGNIFICANT CHANGE UP (ref 2–14)
NEUTROPHILS # BLD AUTO: 4.47 K/UL — SIGNIFICANT CHANGE UP (ref 1.8–7.4)
NEUTROPHILS NFR BLD AUTO: 69.3 % — SIGNIFICANT CHANGE UP (ref 43–77)
NRBC # FLD: 0 — SIGNIFICANT CHANGE UP
PCO2 BLDV: 51 MMHG — SIGNIFICANT CHANGE UP (ref 41–51)
PH BLDV: 7.35 PH — SIGNIFICANT CHANGE UP (ref 7.32–7.43)
PLATELET # BLD AUTO: 284 K/UL — SIGNIFICANT CHANGE UP (ref 150–400)
PMV BLD: 10.8 FL — SIGNIFICANT CHANGE UP (ref 7–13)
PO2 BLDV: 33 MMHG — LOW (ref 35–40)
POTASSIUM BLDV-SCNC: 3.9 MMOL/L — SIGNIFICANT CHANGE UP (ref 3.4–4.5)
POTASSIUM SERPL-MCNC: 4.1 MMOL/L — SIGNIFICANT CHANGE UP (ref 3.5–5.3)
POTASSIUM SERPL-SCNC: 4.1 MMOL/L — SIGNIFICANT CHANGE UP (ref 3.5–5.3)
PROT SERPL-MCNC: 6.3 G/DL — SIGNIFICANT CHANGE UP (ref 6–8.3)
PROTHROM AB SERPL-ACNC: 10.9 SEC — SIGNIFICANT CHANGE UP (ref 9.8–13.1)
RBC # BLD: 4.73 M/UL — SIGNIFICANT CHANGE UP (ref 3.8–5.2)
RBC # FLD: 15.9 % — HIGH (ref 10.3–14.5)
RH IG SCN BLD-IMP: POSITIVE — SIGNIFICANT CHANGE UP
SAO2 % BLDV: 55 % — LOW (ref 60–85)
SODIUM SERPL-SCNC: 142 MMOL/L — SIGNIFICANT CHANGE UP (ref 135–145)
TRIGL SERPL-MCNC: 68 MG/DL — SIGNIFICANT CHANGE UP (ref 10–149)
TROPONIN T SERPL-MCNC: < 0.06 NG/ML — SIGNIFICANT CHANGE UP (ref 0–0.06)
TROPONIN T SERPL-MCNC: < 0.06 NG/ML — SIGNIFICANT CHANGE UP (ref 0–0.06)
WBC # BLD: 6.44 K/UL — SIGNIFICANT CHANGE UP (ref 3.8–10.5)
WBC # FLD AUTO: 6.44 K/UL — SIGNIFICANT CHANGE UP (ref 3.8–10.5)

## 2017-10-03 PROCEDURE — 70551 MRI BRAIN STEM W/O DYE: CPT | Mod: 26

## 2017-10-03 PROCEDURE — 70450 CT HEAD/BRAIN W/O DYE: CPT | Mod: 26

## 2017-10-03 PROCEDURE — 70496 CT ANGIOGRAPHY HEAD: CPT | Mod: 26

## 2017-10-03 PROCEDURE — 99223 1ST HOSP IP/OBS HIGH 75: CPT | Mod: AI

## 2017-10-03 PROCEDURE — 70498 CT ANGIOGRAPHY NECK: CPT | Mod: 26,52

## 2017-10-03 RX ORDER — INFLUENZA VIRUS VACCINE 15; 15; 15; 15 UG/.5ML; UG/.5ML; UG/.5ML; UG/.5ML
0.5 SUSPENSION INTRAMUSCULAR ONCE
Qty: 0 | Refills: 0 | Status: COMPLETED | OUTPATIENT
Start: 2017-10-03 | End: 2017-10-10

## 2017-10-03 RX ORDER — ATORVASTATIN CALCIUM 80 MG/1
40 TABLET, FILM COATED ORAL AT BEDTIME
Qty: 0 | Refills: 0 | Status: DISCONTINUED | OUTPATIENT
Start: 2017-10-03 | End: 2017-10-05

## 2017-10-03 RX ORDER — ASPIRIN/CALCIUM CARB/MAGNESIUM 324 MG
81 TABLET ORAL ONCE
Qty: 0 | Refills: 0 | Status: COMPLETED | OUTPATIENT
Start: 2017-10-03 | End: 2017-10-03

## 2017-10-03 RX ORDER — PREGABALIN 225 MG/1
500 CAPSULE ORAL DAILY
Qty: 0 | Refills: 0 | Status: DISCONTINUED | OUTPATIENT
Start: 2017-10-03 | End: 2017-10-10

## 2017-10-03 RX ORDER — GLUCAGON INJECTION, SOLUTION 0.5 MG/.1ML
1 INJECTION, SOLUTION SUBCUTANEOUS ONCE
Qty: 0 | Refills: 0 | Status: DISCONTINUED | OUTPATIENT
Start: 2017-10-03 | End: 2017-10-10

## 2017-10-03 RX ORDER — INSULIN LISPRO 100/ML
VIAL (ML) SUBCUTANEOUS
Qty: 0 | Refills: 0 | Status: DISCONTINUED | OUTPATIENT
Start: 2017-10-03 | End: 2017-10-10

## 2017-10-03 RX ORDER — SODIUM CHLORIDE 9 MG/ML
1000 INJECTION, SOLUTION INTRAVENOUS
Qty: 0 | Refills: 0 | Status: DISCONTINUED | OUTPATIENT
Start: 2017-10-03 | End: 2017-10-10

## 2017-10-03 RX ORDER — DEXTROSE 50 % IN WATER 50 %
1 SYRINGE (ML) INTRAVENOUS ONCE
Qty: 0 | Refills: 0 | Status: DISCONTINUED | OUTPATIENT
Start: 2017-10-03 | End: 2017-10-10

## 2017-10-03 RX ORDER — INSULIN LISPRO 100/ML
VIAL (ML) SUBCUTANEOUS AT BEDTIME
Qty: 0 | Refills: 0 | Status: DISCONTINUED | OUTPATIENT
Start: 2017-10-03 | End: 2017-10-10

## 2017-10-03 RX ORDER — ASPIRIN/CALCIUM CARB/MAGNESIUM 324 MG
81 TABLET ORAL DAILY
Qty: 0 | Refills: 0 | Status: DISCONTINUED | OUTPATIENT
Start: 2017-10-03 | End: 2017-10-10

## 2017-10-03 RX ORDER — ACETAMINOPHEN 500 MG
650 TABLET ORAL EVERY 6 HOURS
Qty: 0 | Refills: 0 | Status: DISCONTINUED | OUTPATIENT
Start: 2017-10-03 | End: 2017-10-10

## 2017-10-03 RX ORDER — ENOXAPARIN SODIUM 100 MG/ML
40 INJECTION SUBCUTANEOUS EVERY 24 HOURS
Qty: 0 | Refills: 0 | Status: DISCONTINUED | OUTPATIENT
Start: 2017-10-03 | End: 2017-10-10

## 2017-10-03 RX ORDER — DEXTROSE 50 % IN WATER 50 %
12.5 SYRINGE (ML) INTRAVENOUS ONCE
Qty: 0 | Refills: 0 | Status: DISCONTINUED | OUTPATIENT
Start: 2017-10-03 | End: 2017-10-10

## 2017-10-03 RX ORDER — DEXTROSE 50 % IN WATER 50 %
25 SYRINGE (ML) INTRAVENOUS ONCE
Qty: 0 | Refills: 0 | Status: DISCONTINUED | OUTPATIENT
Start: 2017-10-03 | End: 2017-10-10

## 2017-10-03 RX ORDER — INSULIN GLARGINE 100 [IU]/ML
16 INJECTION, SOLUTION SUBCUTANEOUS AT BEDTIME
Qty: 0 | Refills: 0 | Status: DISCONTINUED | OUTPATIENT
Start: 2017-10-03 | End: 2017-10-10

## 2017-10-03 RX ORDER — PYRIDOXINE HCL (VITAMIN B6) 100 MG
100 TABLET ORAL DAILY
Qty: 0 | Refills: 0 | Status: DISCONTINUED | OUTPATIENT
Start: 2017-10-03 | End: 2017-10-10

## 2017-10-03 RX ORDER — ERGOCALCIFEROL 1.25 MG/1
1 CAPSULE ORAL
Qty: 0 | Refills: 0 | COMMUNITY

## 2017-10-03 RX ORDER — ERGOCALCIFEROL 1.25 MG/1
50000 CAPSULE ORAL
Qty: 0 | Refills: 0 | Status: DISCONTINUED | OUTPATIENT
Start: 2017-10-03 | End: 2017-10-10

## 2017-10-03 RX ADMIN — ENOXAPARIN SODIUM 40 MILLIGRAM(S): 100 INJECTION SUBCUTANEOUS at 22:21

## 2017-10-03 RX ADMIN — Medication 100 MILLIGRAM(S): at 15:42

## 2017-10-03 RX ADMIN — INSULIN GLARGINE 16 UNIT(S): 100 INJECTION, SOLUTION SUBCUTANEOUS at 22:21

## 2017-10-03 RX ADMIN — Medication 81 MILLIGRAM(S): at 11:11

## 2017-10-03 RX ADMIN — PREGABALIN 500 MICROGRAM(S): 225 CAPSULE ORAL at 15:41

## 2017-10-03 RX ADMIN — ATORVASTATIN CALCIUM 40 MILLIGRAM(S): 80 TABLET, FILM COATED ORAL at 22:21

## 2017-10-03 RX ADMIN — Medication: at 13:36

## 2017-10-03 RX ADMIN — Medication 81 MILLIGRAM(S): at 15:40
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
## 2017-10-03 NOTE — H&P ADULT - PROBLEM SELECTOR PLAN 2
Monitor on telemetry (atypical)  Continue ASA, Lipitor  NST from 2015 normal  Serial EKGs and enzymes  Echocardiogram ordered  Will likely not pursue ischemic evaluation given acute CVA
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
## 2017-10-03 NOTE — H&P ADULT - PROBLEM SELECTOR PLAN 5
Continue basal insulin with insulin sliding scale  Hold oral hypoglycemics  Check HgA1C, FS  Diabetic diet

## 2017-10-03 NOTE — CONSULT NOTE ADULT - ASSESSMENT
52 y/o RH AA woman w/ HTN, DM II, HLD, recent episodes of transient L-sided numbness p/w L facial numbness since 9 PM last night and left sided weakness this morning. Neurologic exam significant for left facial palsy, mild dysarthria, left upper and lower extremity drift. CTH shows early R BG infarction of uncertain etiology.

## 2017-10-03 NOTE — CONSULT NOTE ADULT - PROBLEM SELECTOR RECOMMENDATION 9
- Patient is not a tPA candidate as last normal was > 4.5 hours ago, not an endovascular candidate given NIHSS < 6 and time of onset > 6 hours ago  - Recommend CTA head/neck w/ IV contrast if normal renal function  - MRI brain w/o contrast (pt has history of claustrophobia, may need pre-medication). If unable to tolerate, repeat CTH in 24 hours  - TTE w/ bubble study  - Telemetry monitoring  - Check HbA1c and lipid profile  - C/w ASA and lipitor  - Permissive HTN < 220/110 x 24 hours  - PT/OT

## 2017-10-03 NOTE — H&P ADULT - ATTENDING COMMENTS
51F DM2 HTN HLD with acute basal ganglia CVA  CVA: Appreciate neurology recs. for MRI/MRA, echo, telemetry, statin, ASA, permissive HTN 24h, PT eval  DM2 management with insulin, hold orals while inpatient

## 2017-10-03 NOTE — H&P ADULT - RS GEN PE MLT RESP DETAILS PC
no wheezes/good air movement/no chest wall tenderness/airway patent/breath sounds equal/no rhonchi/no intercostal retractions/no rales/respirations non-labored/clear to auscultation bilaterally

## 2017-10-03 NOTE — ED PROVIDER NOTE - MEDICAL DECISION MAKING DETAILS
52 yo F c PMH of HTN, HLD, DM and herniated disc in neck presents with 12 hour hx of L facial numbness that progressed to altered mental status and facial droop this morning at 8AM.

## 2017-10-03 NOTE — ED PROVIDER NOTE - NEUROLOGICAL GAIT WEIGHT BEARING
5/5 strength in BUE and BLE: deltoids, biceps, triceps, wrist flexors/extensors, hip flexors, knee flexors/extensors, plantar/dorsiflexors, hallux flexors/extensors

## 2017-10-03 NOTE — H&P ADULT - PROBLEM SELECTOR PLAN 1
Admit to telemetry, serial EKGs, serial cardiac enzymes  Check CBC, CMP, TSH, FLP, HgA1C, UA  Neuro checks and vitals q4hrs  Fall and aspiration precautions, ambulate with assistance  Neuro consult appreciated  CT angio head/neck pending  MRI brain ordered  Echocardiogram with bubble study ordered  Continue ASA and Lipitor  Permissive HTN up to 220/100  PT, OT and S&S evals ordered (dysphagia diet for now; passed screen)  F/U MD note

## 2017-10-03 NOTE — H&P ADULT - NSHPLABSRESULTS_GEN_ALL_CORE
EKG: NSR at 74 bpm  CE x1: Negative  CT head: Early right basal ganglia infarction. No acute intracranial hemorrhage.  Glucose: 219

## 2017-10-03 NOTE — H&P ADULT - NEGATIVE CARDIOVASCULAR SYMPTOMS
no claudication/no dyspnea on exertion/no paroxysmal nocturnal dyspnea/no orthopnea/no palpitations/no peripheral edema

## 2017-10-03 NOTE — ED PROVIDER NOTE - ATTENDING CONTRIBUTION TO CARE
I performed a face to face bedside interview with patient regarding history of present illness, review of symptoms and past medical history. I completed an independent physical exam.  I have discussed patient's plan of care.   I agree with note as stated above, having amended the EMR as needed to reflect my findings. I have discussed the assessment and plan of care.  This includes during the time I functioned as the attending physician for this patient.  Attending Contribution to Care: agree with plan of resident. pt stable, p/w left sided weakness. with minimal improvement over time. pt states had paresthesia started last night, and started acute left sided weakness that started today. pt denies any pain, headache, n/v/d. mild confusion pta but currently asymptomatic

## 2017-10-03 NOTE — H&P ADULT - NSHPSOCIALHISTORY_GEN_ALL_CORE
. Lives with sister and mother. Normally independent. Unemployed (on disability). Denies smoking/drinking history. Received flu vaccine 2016 but not yet this year.

## 2017-10-03 NOTE — H&P ADULT - NEGATIVE NEUROLOGICAL SYMPTOMS
no headache/no hemiparesis/no vertigo/no confusion/no loss of sensation/no loss of consciousness/no generalized seizures/no transient paralysis/no syncope/no focal seizures/no tremors
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
## 2017-10-03 NOTE — ED PROVIDER NOTE - PROGRESS NOTE DETAILS
CTH non con showed no acute intracranial hemorrhage. no tPA given since onset of sx was 12 hours ago. Pt given ASA 81 after passing swallow eval. CTA head/neck with IV contrast ordered. pt admitted to tele with neuro following.

## 2017-10-03 NOTE — H&P ADULT - NEGATIVE GASTROINTESTINAL SYMPTOMS
no nausea/no diarrhea/no vomiting/no change in bowel habits/no hematochezia/no melena/no constipation/no flatulence/no abdominal pain

## 2017-10-03 NOTE — H&P ADULT - ASSESSMENT
50 y/o obese female with a PMHx of HTN, HLD, DM, sarcoidosis and chronic back pain secondary to herniated discs in the cervical and lumbar spine presents to ED with left facial droop and left sided weakness, found to have an early right basal ganglia infarction.

## 2017-10-03 NOTE — ED ADULT TRIAGE NOTE - CHIEF COMPLAINT QUOTE
p/t c/o of sudden onset of lt sided weakness with facial droop, onset is  45 min ago, fs 205 mdl ,p/t evaluated by dr Alcaraz and code stroke initiated

## 2017-10-03 NOTE — ED PROVIDER NOTE - PMH
Chronic back pain    DM (diabetes mellitus)    HTN (hypertension)    Hyperlipemia    Sarcoidosis Chronic back pain    DM (diabetes mellitus)    Hiatal hernia    HTN (hypertension)    Hyperlipemia    Sarcoidosis

## 2017-10-03 NOTE — H&P ADULT - PMH
Chronic back pain    DM (diabetes mellitus)    Hiatal hernia    HLD (hyperlipidemia)    HTN (hypertension)    Sarcoidosis

## 2017-10-03 NOTE — ED PROVIDER NOTE - FAMILY HISTORY
Family history of coronary artery disease     Family history of MI (myocardial infarction)     Sibling  Still living? Unknown  Family history of hypertension, Age at diagnosis: Age Unknown  Family history of diabetes mellitus, Age at diagnosis: Age Unknown
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
## 2017-10-03 NOTE — H&P ADULT - PROBLEM SELECTOR PLAN 3
Hold antihypertensives for now to allow for permissive HTN up to 220/110  Monitor BP q4hrs  Sodium restriction

## 2017-10-04 ENCOUNTER — TRANSCRIPTION ENCOUNTER (OUTPATIENT)
Age: 52
End: 2017-10-04

## 2017-10-04 DIAGNOSIS — E83.42 HYPOMAGNESEMIA: ICD-10-CM

## 2017-10-04 LAB
APTT BLD: 29.5 SEC — SIGNIFICANT CHANGE UP (ref 27.5–37.4)
BUN SERPL-MCNC: 8 MG/DL — SIGNIFICANT CHANGE UP (ref 7–23)
CALCIUM SERPL-MCNC: 8.4 MG/DL — SIGNIFICANT CHANGE UP (ref 8.4–10.5)
CHLORIDE SERPL-SCNC: 103 MMOL/L — SIGNIFICANT CHANGE UP (ref 98–107)
CO2 SERPL-SCNC: 25 MMOL/L — SIGNIFICANT CHANGE UP (ref 22–31)
CREAT SERPL-MCNC: 0.57 MG/DL — SIGNIFICANT CHANGE UP (ref 0.5–1.3)
GLUCOSE SERPL-MCNC: 121 MG/DL — HIGH (ref 70–99)
HBA1C BLD-MCNC: 9.5 % — HIGH (ref 4–5.6)
HCT VFR BLD CALC: 35.8 % — SIGNIFICANT CHANGE UP (ref 34.5–45)
HGB BLD-MCNC: 10.8 G/DL — LOW (ref 11.5–15.5)
INR BLD: 1.11 — SIGNIFICANT CHANGE UP (ref 0.88–1.17)
MAGNESIUM SERPL-MCNC: 1.5 MG/DL — LOW (ref 1.6–2.6)
MCHC RBC-ENTMCNC: 23.2 PG — LOW (ref 27–34)
MCHC RBC-ENTMCNC: 30.2 % — LOW (ref 32–36)
MCV RBC AUTO: 76.8 FL — LOW (ref 80–100)
NRBC # FLD: 0 — SIGNIFICANT CHANGE UP
PHOSPHATE SERPL-MCNC: 3.3 MG/DL — SIGNIFICANT CHANGE UP (ref 2.5–4.5)
PLATELET # BLD AUTO: 278 K/UL — SIGNIFICANT CHANGE UP (ref 150–400)
PMV BLD: 10.9 FL — SIGNIFICANT CHANGE UP (ref 7–13)
POTASSIUM SERPL-MCNC: 3.6 MMOL/L — SIGNIFICANT CHANGE UP (ref 3.5–5.3)
POTASSIUM SERPL-SCNC: 3.6 MMOL/L — SIGNIFICANT CHANGE UP (ref 3.5–5.3)
PROTHROM AB SERPL-ACNC: 12.5 SEC — SIGNIFICANT CHANGE UP (ref 9.8–13.1)
RBC # BLD: 4.66 M/UL — SIGNIFICANT CHANGE UP (ref 3.8–5.2)
RBC # FLD: 16.3 % — HIGH (ref 10.3–14.5)
RHEUMATOID FACT SERPL-ACNC: < 7 IU/ML — SIGNIFICANT CHANGE UP
SODIUM SERPL-SCNC: 141 MMOL/L — SIGNIFICANT CHANGE UP (ref 135–145)
THROMBIN TIME: 22.5 SEC — SIGNIFICANT CHANGE UP (ref 17–26)
TSH SERPL-MCNC: 1.13 UIU/ML — SIGNIFICANT CHANGE UP (ref 0.27–4.2)
WBC # BLD: 6.69 K/UL — SIGNIFICANT CHANGE UP (ref 3.8–10.5)
WBC # FLD AUTO: 6.69 K/UL — SIGNIFICANT CHANGE UP (ref 3.8–10.5)

## 2017-10-04 PROCEDURE — 99223 1ST HOSP IP/OBS HIGH 75: CPT

## 2017-10-04 RX ORDER — HYDRALAZINE HCL 50 MG
5 TABLET ORAL ONCE
Qty: 0 | Refills: 0 | Status: COMPLETED | OUTPATIENT
Start: 2017-10-04 | End: 2017-10-04

## 2017-10-04 RX ORDER — MAGNESIUM SULFATE 500 MG/ML
1 VIAL (ML) INJECTION ONCE
Qty: 0 | Refills: 0 | Status: COMPLETED | OUTPATIENT
Start: 2017-10-04 | End: 2017-10-04

## 2017-10-04 RX ADMIN — Medication 5 MILLIGRAM(S): at 22:10

## 2017-10-04 RX ADMIN — ENOXAPARIN SODIUM 40 MILLIGRAM(S): 100 INJECTION SUBCUTANEOUS at 21:49

## 2017-10-04 RX ADMIN — Medication 100 GRAM(S): at 11:29

## 2017-10-04 RX ADMIN — Medication 2: at 17:33

## 2017-10-04 RX ADMIN — Medication 5 MILLIGRAM(S): at 13:27

## 2017-10-04 RX ADMIN — Medication 81 MILLIGRAM(S): at 11:29

## 2017-10-04 RX ADMIN — PREGABALIN 500 MICROGRAM(S): 225 CAPSULE ORAL at 11:29

## 2017-10-04 RX ADMIN — Medication 100 MILLIGRAM(S): at 11:29

## 2017-10-04 RX ADMIN — ATORVASTATIN CALCIUM 40 MILLIGRAM(S): 80 TABLET, FILM COATED ORAL at 21:49

## 2017-10-04 RX ADMIN — INSULIN GLARGINE 16 UNIT(S): 100 INJECTION, SOLUTION SUBCUTANEOUS at 21:49

## 2017-10-04 NOTE — CONSULT NOTE ADULT - ASSESSMENT
52 y/o obese female with a PMHx of HTN, HLD, DM, sarcoidosis and chronic back pain secondary to herniated discs in the cervical and lumbar spine presents to ED with left facial droop and left sided weakness. Admitted with CVA. I got involved for uncontrolled HTN

## 2017-10-04 NOTE — DISCHARGE NOTE ADULT - PLAN OF CARE
prevent further stroke, tight blood pressure and diabetic control follow up with Neurologist Dr. Libman in 1-2 weeks, continue aspirin and lipitor, you will need a Transesophageal Echocardiogram  and loop recorder as an outpatient low salt, your blood pressure medications were changed, please adhere to new regimen, follow up with Cardiologist in 1-2 weeks consistent carbohydrate diet, continue your home diabetic regimen low cholesterol diet, continue Lipitor
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
## 2017-10-04 NOTE — OCCUPATIONAL THERAPY INITIAL EVALUATION ADULT - PERTINENT HX OF CURRENT PROBLEM, REHAB EVAL
Pt is a 51 year old female with a PMHx of HTN, HLD, DM, sarcoidosis and chronic back pain secondary to herniated discs in the cervical and lumbar spine who presents to [facility] on 10/3/17 with left facial droop and left sided weakness. MRI Head w/o Contrast on 10/3/17 displayed acute infarcts involving the head of the right caudate nucleus, right lentiform nucleus and scattered areas of the right frontal and parietal regions. No evidence of hemorrhagic transformation.

## 2017-10-04 NOTE — DISCHARGE NOTE ADULT - CARE PROVIDER_API CALL
Libman, Richard B (MD), Neurology; Vascular Neurology  [address]  [place] [address]  Phone: [phone]  Fax: [phone]    Shiv Peraza), Cardiovascular Disease; Internal Medicine; Nuclear Cardiology  [address] [place] [address]  Phone: [phone]  Fax: [phone]    Blake Ritchie), Internal Medicine; Nephrology  [address]  [place] [address]  Phone: [phone]  Fax: [phone]

## 2017-10-04 NOTE — DISCHARGE NOTE ADULT - PATIENT PORTAL LINK FT
“You can access the Follow[facility] Patient Portal, offered by [facility], by registering with the following website: [email or web address]”

## 2017-10-04 NOTE — CONSULT NOTE ADULT - PROBLEM SELECTOR RECOMMENDATION 9
Patient is having uncontrolled HTN. She claims it to be for only 2 yrs. Admitted with CVA. BP was planned to be kept high for 24 hrs as permissive HTN after CVA. She was on clonidine at home and should be started back on it as low dose.  Her HTN is likely Primary but need to be R/O Renal artery stenosis. No hypokalemia or alkalosis to suggest Prim Hyperaldosteronism. No h/o attacks of anxiety/diaphoresis/palpitation  Start clonidine 0.1mg Q8hrs  Start Losartan 50mg QD  Use Hydralazine 10mg IV q4hr PRN for SBP >200  Goal BP now is 160-170/

## 2017-10-04 NOTE — DISCHARGE NOTE ADULT - CARE PLAN
Principal Discharge DX:	CVA (cerebral vascular accident)  Goal:	prevent further stroke, tight blood pressure and diabetic control  Instructions for follow-up, activity and diet:	follow up with Neurologist Dr. Libman in 1-2 weeks, continue aspirin and lipitor, you will need a Transesophageal Echocardiogram  and loop recorder as an outpatient  Secondary Diagnosis:	HTN (hypertension)  Instructions for follow-up, activity and diet:	low salt, your blood pressure medications were changed, please adhere to new regimen, follow up with Cardiologist in 1-2 weeks  Secondary Diagnosis:	DM (diabetes mellitus)  Instructions for follow-up, activity and diet:	consistent carbohydrate diet, continue your home diabetic regimen  Secondary Diagnosis:	HLD (hyperlipidemia)  Instructions for follow-up, activity and diet:	low cholesterol diet, continue Lipitor  Secondary Diagnosis:	Sarcoidosis
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
## 2017-10-04 NOTE — OCCUPATIONAL THERAPY INITIAL EVALUATION ADULT - LIVES WITH, PROFILE
Pt. reports she lives with her sister and mother in a house with 5 steps to enter. Once inside, pt. reports she has full flight of steps to negotiate to reach the 2nd floor where main bedroom and bathroom are located. Per pt., she has a bathtub in her bathroom.

## 2017-10-04 NOTE — PROGRESS NOTE ADULT - ASSESSMENT
52 y/o obese female with a PMHx of HTN, HLD, DM, sarcoidosis and chronic back pain secondary to herniated discs in the cervical and lumbar spine presents to ED with left facial droop and left sided weakness, found to have an early right basal ganglia infarction.

## 2017-10-04 NOTE — OCCUPATIONAL THERAPY INITIAL EVALUATION ADULT - MD ORDER
Occupational Therapy to evaluate and treat. Occupational Therapy to evaluate and treat. Ambulate with assistance. Per MARY Romero, pt is okay to participate in OT evaluation and perform activity as tolerated.

## 2017-10-04 NOTE — OCCUPATIONAL THERAPY INITIAL EVALUATION ADULT - PATIENT/FAMILY/SIGNIFICANT OTHER GOALS STATEMENT, OT EVAL
Patient reports she feels at/close to baseline functional status and wants to return home post-hospital discharge.
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
## 2017-10-04 NOTE — CHART NOTE - NSCHARTNOTEFT_GEN_A_CORE
Pts BP noted to be 230/110  s/p Hydralazine IV 5 mg now /108    Will order vasculitits workup as d/w Dr. Beltran    D/w Neuro--> CHRIS and ILR can be done as outpatient  also advised to keep BP permissive to 220/110--> recommended not starting oral antihypertensives at this time.  WIll monitor

## 2017-10-04 NOTE — CONSULT NOTE ADULT - ASSESSMENT
EKG - normal sinus rhythm   Nuclear stress test 2015- normal LV no ischemia    a/p     1) Acute right basal ganglia stroke - no carotid stenosis, permissive HTN, get echo with bubble study, no signnificant arrythmias on tele, will monite cont asa lipitor, neurology follow up     2) Chest pain - atypical, EKG and cardiac enzymes negative, get 2d echo    3) DVT prophylasix - on lovenox

## 2017-10-04 NOTE — PROGRESS NOTE ADULT - SUBJECTIVE AND OBJECTIVE BOX
chief complaint : facial droop, weakness    SUBJECTIVE / OVERNIGHT EVENTS: pt denies chest pain, shortness of breath, nausea,  v      MEDICATIONS  (STANDING):  aspirin enteric coated 81 milliGRAM(s) Oral daily  atorvastatin 40 milliGRAM(s) Oral at bedtime  cyanocobalamin 500 MICROGram(s) Oral daily  dextrose 5%. 1000 milliLiter(s) (50 mL/Hr) IV Continuous <Continuous>  dextrose 50% Injectable 12.5 Gram(s) IV Push once  dextrose 50% Injectable 25 Gram(s) IV Push once  dextrose 50% Injectable 25 Gram(s) IV Push once  enoxaparin Injectable 40 milliGRAM(s) SubCutaneous every 24 hours  ergocalciferol 54681 Unit(s) Oral <User Schedule>  influenza   Vaccine 0.5 milliLiter(s) IntraMuscular once  insulin glargine Injectable (LANTUS) 16 Unit(s) SubCutaneous at bedtime  insulin lispro (HumaLOG) corrective regimen sliding scale   SubCutaneous three times a day before meals  insulin lispro (HumaLOG) corrective regimen sliding scale   SubCutaneous at bedtime  pyridoxine 100 milliGRAM(s) Oral daily    MEDICATIONS  (PRN):  acetaminophen   Tablet. 650 milliGRAM(s) Oral every 6 hours PRN Mild and moderate pain  dextrose Gel 1 Dose(s) Oral once PRN Blood Glucose LESS THAN 70 milliGRAM(s)/deciliter  glucagon  Injectable 1 milliGRAM(s) IntraMuscular once PRN Glucose LESS THAN 70 milligrams/deciliter        CAPILLARY BLOOD GLUCOSE  222 (04 Oct 2017 17:14)  144 (04 Oct 2017 12:35)  127 (04 Oct 2017 08:25)  164 (03 Oct 2017 21:06)        I&O's Summary      Constitutional: No fever, fatigue  Skin: No rash.  Eyes: No recent vision problems or eye pain.  ENT: No congestion, ear pain, or sore throat.  Cardiovascular: No chest pain or palpation.  Respiratory: No cough, shortness of breath, congestion, or wheezing.  Gastrointestinal: No abdominal pain, nausea, vomiting, or diarrhea.  Genitourinary: No dysuria.  Musculoskeletal: No joint swelling.  Neurologic: No headache.    PHYSICAL EXAM:  GENERAL: NAD  EYES: EOMI, PERRLA  NECK: Supple, No JVD  CHEST/LUNG: dec breath sounds at bases  HEART:  S1 , S2 +  ABDOMEN: soft, bs+  EXTREMITIES:  no edema  NEUROLOGY: alert awake oriented , no neck stiffness    LABS:                        10.8   6.69  )-----------( 278      ( 04 Oct 2017 07:10 )             35.8     10-04    141  |  103  |  8   ----------------------------<  121<H>  3.6   |  25  |  0.57    Ca    8.4      04 Oct 2017 07:10  Phos  3.3     10-04  Mg     1.5     10-04    TPro  6.3  /  Alb  3.6  /  TBili  0.2  /  DBili  x   /  AST  14  /  ALT  13  /  AlkPhos  61  10-03    PT/INR - ( 04 Oct 2017 16:36 )   PT: 12.5 SEC;   INR: 1.11          PTT - ( 04 Oct 2017 16:36 )  PTT:29.5 SEC  CARDIAC MARKERS ( 03 Oct 2017 16:50 )  x     / < 0.06 ng/mL / 59 u/L / 1.52 ng/mL / x      CARDIAC MARKERS ( 03 Oct 2017 09:24 )  x     / < 0.06 ng/mL / 72 u/L / 1.39 ng/mL / x              RADIOLOGY & ADDITIONAL TESTS:    Imaging Personally Reviewed:    Consultant(s) Notes Reviewed:      Care Discussed with Consultants/Other Providers:

## 2017-10-04 NOTE — OCCUPATIONAL THERAPY INITIAL EVALUATION ADULT - LEVEL OF INDEPENDENCE: SIT/SUPINE, REHAB EVAL
NT; Pt left sitting at EOB. NAD. Call bell in reach. All lines intact & precautions maintained. RNHeather made aware. Family present bedside.

## 2017-10-04 NOTE — PROGRESS NOTE ADULT - PROBLEM SELECTOR PLAN 4
uncontrolled  start Hydralazine  as per pt , pts bp been elevated despite on multiple htn meds - renal Doppler to r/o bossman

## 2017-10-04 NOTE — DISCHARGE NOTE ADULT - NS AS ACTIVITY OBS
Walking-Indoors allowed/activity as tolerated/Showering allowed/Walking-Outdoors allowed/Stairs allowed

## 2017-10-04 NOTE — DISCHARGE NOTE ADULT - CARE PROVIDERS DIRECT ADDRESSES
[email or web address],DirectAddress_Unknown,DirectAddress_Unknown

## 2017-10-04 NOTE — DISCHARGE NOTE ADULT - ADDITIONAL INSTRUCTIONS
follow up with Dr. Peraza Cardiologist in 1-2 weeks for blood pressure management   follow up with Neurologist Dr. Libman in 1-2 weeks  follow up with your PCP: Dr. Blake Ritchie

## 2017-10-04 NOTE — DISCHARGE NOTE ADULT - MEDICATION SUMMARY - MEDICATIONS TO TAKE
I will START or STAY ON the medications listed below when I get home from the hospital:    aspirin 81 mg oral delayed release tablet  -- 1 tab(s) by mouth once a day  -- Indication: For CVA (cerebral vascular accident)    losartan 50 mg oral tablet  -- 1 tab(s) by mouth once a day  -- Indication: For HTN (hypertension)    Janumet 1000 mg-50 mg oral tablet  -- 1 tab(s) by mouth 2 times a day  -- Do not drink alcoholic beverages when taking this medication.  Take with food or milk.    -- Indication: For DM (diabetes mellitus)    Lantus Solostar Pen 100 units/mL subcutaneous solution  -- 16 unit(s) subcutaneous once a day (at bedtime) MDD:dispense 1 box  -- Do not drink alcoholic beverages when taking this medication.  It is very important that you take or use this exactly as directed.  Do not skip doses or discontinue unless directed by your doctor.  Keep in refrigerator.  Do not freeze.    -- Indication: For DM (diabetes mellitus)    atorvastatin 80 mg oral tablet  -- 1 tab(s) by mouth once a day (at bedtime)  -- Indication: For HLD (hyperlipidemia)    metoprolol tartrate 50 mg oral tablet  -- 1 tab(s) by mouth 2 times a day  -- Indication: For HTN (hypertension)    NIFEdipine 60 mg oral tablet, extended release  -- 1 tab(s) by mouth once a day  -- Indication: For HTN (hypertension)    Vitamin D2 50,000 intl units (1.25 mg) oral capsule  -- 1 cap(s) by mouth once a week  -- Indication: For Supplement     Vitamin B12 500 mcg oral tablet  -- 1 tab(s) by mouth once a day  -- Indication: For Supplement     Vitamin B6 100 mg oral tablet  -- 1 tab(s) by mouth once a day  -- Indication: For Supplement

## 2017-10-05 LAB
BUN SERPL-MCNC: 6 MG/DL — LOW (ref 7–23)
CALCIUM SERPL-MCNC: 8.6 MG/DL — SIGNIFICANT CHANGE UP (ref 8.4–10.5)
CHLORIDE SERPL-SCNC: 102 MMOL/L — SIGNIFICANT CHANGE UP (ref 98–107)
CO2 SERPL-SCNC: 25 MMOL/L — SIGNIFICANT CHANGE UP (ref 22–31)
CREAT SERPL-MCNC: 0.58 MG/DL — SIGNIFICANT CHANGE UP (ref 0.5–1.3)
DRVVT CONFIRM: 26.5 SEC — LOW (ref 27–41)
DRVVT INTERPRETATION.: NEGATIVE — SIGNIFICANT CHANGE UP
DRVVT SCREEN TO CONFIRM RATIO: 0.9 — SIGNIFICANT CHANGE UP (ref 0–1.2)
GLUCOSE SERPL-MCNC: 145 MG/DL — HIGH (ref 70–99)
HCT VFR BLD CALC: 36.4 % — SIGNIFICANT CHANGE UP (ref 34.5–45)
HGB BLD-MCNC: 11.2 G/DL — LOW (ref 11.5–15.5)
MCHC RBC-ENTMCNC: 23.6 PG — LOW (ref 27–34)
MCHC RBC-ENTMCNC: 30.8 % — LOW (ref 32–36)
MCV RBC AUTO: 76.8 FL — LOW (ref 80–100)
NORMALIZED SCT PPP-RTO: 0.96 — SIGNIFICANT CHANGE UP (ref 0.81–1.17)
NORMALIZED SCT PPP-RTO: 28.5 SEC — LOW (ref 33.7–49.5)
NORMALIZED SCT PPP-RTO: NEGATIVE — SIGNIFICANT CHANGE UP
NRBC # FLD: 0 — SIGNIFICANT CHANGE UP
PLATELET # BLD AUTO: 261 K/UL — SIGNIFICANT CHANGE UP (ref 150–400)
PMV BLD: 10.9 FL — SIGNIFICANT CHANGE UP (ref 7–13)
POTASSIUM SERPL-MCNC: 3.5 MMOL/L — SIGNIFICANT CHANGE UP (ref 3.5–5.3)
POTASSIUM SERPL-SCNC: 3.5 MMOL/L — SIGNIFICANT CHANGE UP (ref 3.5–5.3)
RBC # BLD: 4.74 M/UL — SIGNIFICANT CHANGE UP (ref 3.8–5.2)
RBC # FLD: 16.1 % — HIGH (ref 10.3–14.5)
SODIUM SERPL-SCNC: 140 MMOL/L — SIGNIFICANT CHANGE UP (ref 135–145)
WBC # BLD: 6.27 K/UL — SIGNIFICANT CHANGE UP (ref 3.8–10.5)
WBC # FLD AUTO: 6.27 K/UL — SIGNIFICANT CHANGE UP (ref 3.8–10.5)

## 2017-10-05 RX ORDER — LOSARTAN POTASSIUM 100 MG/1
50 TABLET, FILM COATED ORAL DAILY
Qty: 0 | Refills: 0 | Status: DISCONTINUED | OUTPATIENT
Start: 2017-10-05 | End: 2017-10-10

## 2017-10-05 RX ORDER — ATORVASTATIN CALCIUM 80 MG/1
80 TABLET, FILM COATED ORAL AT BEDTIME
Qty: 0 | Refills: 0 | Status: DISCONTINUED | OUTPATIENT
Start: 2017-10-05 | End: 2017-10-10

## 2017-10-05 RX ADMIN — LOSARTAN POTASSIUM 50 MILLIGRAM(S): 100 TABLET, FILM COATED ORAL at 17:34

## 2017-10-05 RX ADMIN — Medication 81 MILLIGRAM(S): at 11:29

## 2017-10-05 RX ADMIN — ATORVASTATIN CALCIUM 80 MILLIGRAM(S): 80 TABLET, FILM COATED ORAL at 21:19

## 2017-10-05 RX ADMIN — Medication 100 MILLIGRAM(S): at 11:29

## 2017-10-05 RX ADMIN — ENOXAPARIN SODIUM 40 MILLIGRAM(S): 100 INJECTION SUBCUTANEOUS at 21:19

## 2017-10-05 RX ADMIN — INSULIN GLARGINE 16 UNIT(S): 100 INJECTION, SOLUTION SUBCUTANEOUS at 21:20

## 2017-10-05 RX ADMIN — PREGABALIN 500 MICROGRAM(S): 225 CAPSULE ORAL at 11:29

## 2017-10-05 RX ADMIN — Medication 1: at 17:34

## 2017-10-05 NOTE — PROGRESS NOTE ADULT - ASSESSMENT
EKG - normal sinus rhythm   Nuclear stress test 2015- normal LV no ischemia    a/p     1) Acute right basal ganglia stroke - no carotid stenosis, permissive HTN, get echo with bubble study, no signnificant arrythmias on tele, will monite cont asa lipitor, neurology follow up     2) Chest pain - atypical, EKG and cardiac enzymes negative, get 2d echo    3) DVT prophylasix - on lovenox EKG - normal sinus rhythm   Nuclear stress test 2015- normal LV no ischemia    a/p     1) Acute right basal ganglia stroke - no carotid stenosis, HTN elevated started on losartan 50mg if remains increased increase to 100mg QD, get echo with bubble study, no signnificant arrythmias on tele, will monite cont asa lipitor, neurology follow up     2) Chest pain - atypical, EKG and cardiac enzymes negative, get 2d echo    3) DVT prophylasix - on lovenox

## 2017-10-05 NOTE — DIETITIAN INITIAL EVALUATION ADULT. - OTHER INFO
Nutrition consult for HbA1c > 7%.  Pt endorses good PO intake and appetite at this time.  No GI distress (nausea/vomiting/diarrhea/constipation.) No difficulties chewing or swallowing foods and fluids reported, s/p speech and swallow 10/5 regular with thins recommended. RD provided the patient with extensive verbal and written DM diet education; including, carb counting, label reading, meal planning, pre-prandial and post-prandial finger stick goals, and HbA1c goal. Patient was also made aware of the physiological implications of poor glycemic control. Evening snack encouraged( 15-30 grams of carbohydrate + protein).

## 2017-10-05 NOTE — SWALLOW BEDSIDE ASSESSMENT ADULT - SWALLOW EVAL: DIAGNOSIS
1. The patient demonstrated functional oral management of puree, solid, honey thick, nectar thick, and thin liquid textures marked by adequate bolus collection, transfer, and posterior transport. 2. The patient demonstrated a functional pharyngeal phase of the swallow for puree, solid, nectar thick, honey thick liquid, and thin liquid textures marked by timely pharyngeal swallow trigger with adequate hyolaryngeal elevation and excursion upon digital palpation without any evidence of airway penetration. 1. The patient demonstrated functional oral management of puree, solid, honey thick, nectar thick, and thin liquid textures marked by adequate bolus collection, transfer, and posterior transport. 2. The patient demonstrated a functional pharyngeal phase of the swallow for puree, solid, honey thick liquid, nectar thick liquid, and thin liquid textures marked by timely pharyngeal swallow trigger with adequate hyolaryngeal elevation and excursion upon digital palpation without any evidence of airway penetration.

## 2017-10-05 NOTE — DIETITIAN INITIAL EVALUATION ADULT. - PERTINENT LABORATORY DATA
10-05 Na140 mmol/L Glu 145 mg/dL<H> K+ 3.5 mmol/L Cr  0.58 mg/dL BUN 6 mg/dL<L> Phos n/a   Alb n/a   PAB n/a

## 2017-10-05 NOTE — PROGRESS NOTE ADULT - SUBJECTIVE AND OBJECTIVE BOX
chief complaint : facial droop, weakness    SUBJECTIVE / OVERNIGHT EVENTS: pt denies chest pain, shortness of breath, nausea,  v    MEDICATIONS  (STANDING):  aspirin enteric coated 81 milliGRAM(s) Oral daily  atorvastatin 80 milliGRAM(s) Oral at bedtime  cyanocobalamin 500 MICROGram(s) Oral daily  dextrose 5%. 1000 milliLiter(s) (50 mL/Hr) IV Continuous <Continuous>  dextrose 50% Injectable 12.5 Gram(s) IV Push once  dextrose 50% Injectable 25 Gram(s) IV Push once  dextrose 50% Injectable 25 Gram(s) IV Push once  enoxaparin Injectable 40 milliGRAM(s) SubCutaneous every 24 hours  ergocalciferol 02163 Unit(s) Oral <User Schedule>  influenza   Vaccine 0.5 milliLiter(s) IntraMuscular once  insulin glargine Injectable (LANTUS) 16 Unit(s) SubCutaneous at bedtime  insulin lispro (HumaLOG) corrective regimen sliding scale   SubCutaneous three times a day before meals  insulin lispro (HumaLOG) corrective regimen sliding scale   SubCutaneous at bedtime  losartan 50 milliGRAM(s) Oral daily  pyridoxine 100 milliGRAM(s) Oral daily    MEDICATIONS  (PRN):  acetaminophen   Tablet. 650 milliGRAM(s) Oral every 6 hours PRN Mild and moderate pain  dextrose Gel 1 Dose(s) Oral once PRN Blood Glucose LESS THAN 70 milliGRAM(s)/deciliter  glucagon  Injectable 1 milliGRAM(s) IntraMuscular once PRN Glucose LESS THAN 70 milligrams/deciliter    Vital Signs Last 24 Hrs  T(C): 37.2 (05 Oct 2017 20:35), Max: 37.2 (05 Oct 2017 20:35)  T(F): 98.9 (05 Oct 2017 20:35), Max: 98.9 (05 Oct 2017 20:35)  HR: 85 (05 Oct 2017 20:35) (60 - 85)  BP: 182/98 (05 Oct 2017 22:45) (163/130 - 219/105)  BP(mean): --  RR: 18 (05 Oct 2017 20:35) (18 - 18)  SpO2: 99% (05 Oct 2017 20:35) (95% - 99%)    Constitutional: No fever, fatigue  Skin: No rash.  Eyes: No recent vision problems or eye pain.  ENT: No congestion, ear pain, or sore throat.  Cardiovascular: No chest pain or palpation.  Respiratory: No cough, shortness of breath, congestion, or wheezing.  Gastrointestinal: No abdominal pain, nausea, vomiting, or diarrhea.  Genitourinary: No dysuria.  Musculoskeletal: No joint swelling.  Neurologic: No headache.    PHYSICAL EXAM:  GENERAL: NAD  EYES: EOMI, PERRLA  NECK: Supple, No JVD  CHEST/LUNG: dec breath sounds at bases  HEART:  S1 , S2 +  ABDOMEN: soft, bs+  EXTREMITIES:  no edema  NEUROLOGY: alert awake oriented , no neck stiffness    LABS:  10-05    140  |  102  |  6<L>  ----------------------------<  145<H>  3.5   |  25  |  0.58    Ca    8.6      05 Oct 2017 06:55  Phos  3.3     10-04  Mg     1.5     10-04      Creatinine Trend: 0.58 <--, 0.57 <--, 0.68 <--                        11.2   6.27  )-----------( 261      ( 05 Oct 2017 06:55 )             36.4     Urine Studies:              PT/INR - ( 04 Oct 2017 16:36 )   PT: 12.5 SEC;   INR: 1.11          PTT - ( 04 Oct 2017 16:36 )  PTT:29.5 SEC

## 2017-10-05 NOTE — PROGRESS NOTE ADULT - SUBJECTIVE AND OBJECTIVE BOX
INTERVAL HISTORY: pt seen in am comfortable, not in distress  	  MEDICATIONS:  aspirin enteric coated 81 milliGRAM(s) Oral daily  enoxaparin Injectable 40 milliGRAM(s) SubCutaneous every 24 hours  losartan 50 milliGRAM(s) Oral daily        acetaminophen   Tablet. 650 milliGRAM(s) Oral every 6 hours PRN      atorvastatin 80 milliGRAM(s) Oral at bedtime  dextrose 50% Injectable 12.5 Gram(s) IV Push once  dextrose 50% Injectable 25 Gram(s) IV Push once  dextrose 50% Injectable 25 Gram(s) IV Push once  dextrose Gel 1 Dose(s) Oral once PRN  glucagon  Injectable 1 milliGRAM(s) IntraMuscular once PRN  insulin glargine Injectable (LANTUS) 16 Unit(s) SubCutaneous at bedtime  insulin lispro (HumaLOG) corrective regimen sliding scale   SubCutaneous three times a day before meals  insulin lispro (HumaLOG) corrective regimen sliding scale   SubCutaneous at bedtime    cyanocobalamin 500 MICROGram(s) Oral daily  dextrose 5%. 1000 milliLiter(s) IV Continuous <Continuous>  ergocalciferol 48961 Unit(s) Oral <User Schedule>  influenza   Vaccine 0.5 milliLiter(s) IntraMuscular once  pyridoxine 100 milliGRAM(s) Oral daily      PHYSICAL EXAM:  T(C): 37 (10-05-17 @ 14:35), Max: 37.4 (10-04-17 @ 21:47)  HR: 81 (10-05-17 @ 17:33) (60 - 86)  BP: 163/130 (10-05-17 @ 17:33) (163/130 - 219/105)  RR: 18 (10-05-17 @ 14:35) (17 - 18)  SpO2: 97% (10-05-17 @ 14:35) (95% - 100%)  Wt(kg): --  I&O's Summary        Appearance: Normal	  HEENT:   Normal oral mucosa, PERRL, EOMI	  Cardiovascular: Normal S1 S2, No JVD, No murmurs, No edema  Respiratory: Lungs clear to auscultation	  Gastrointestinal:  Soft, Non-tender, + BS	  Extremities: Normal range of motion, No clubbing, cyanosis or edema                                    11.2   6.27  )-----------( 261      ( 05 Oct 2017 06:55 )             36.4     10-05    140  |  102  |  6<L>  ----------------------------<  145<H>  3.5   |  25  |  0.58    Ca    8.6      05 Oct 2017 06:55  Phos  3.3     10-04  Mg     1.5     10-04      proBNP:   Lipid Profile:   HgA1c:   TSH:

## 2017-10-05 NOTE — PROGRESS NOTE ADULT - SUBJECTIVE AND OBJECTIVE BOX
Dr. Jj (Nephrology)  Office [phone]  Cell [phone]  Inge RODRIGUEZ  Cell [phone]      Patient is a 51y old  Female who presents with a chief complaint of Facial droop, weakness (04 Oct 2017 08:30)      Patient seen and examined at bedside. No chest pain/sob    VITALS:  T(F): 98.1 (10-05-17 @ 06:12), Max: 99.3 (10-04-17 @ 21:47)  HR: 60 (10-05-17 @ 06:12)  BP: 179/96 (10-05-17 @ 06:12)  RR: 18 (10-05-17 @ 06:12)  SpO2: 95% (10-05-17 @ 06:12)  Wt(kg): --        PHYSICAL EXAM:  Constitutional: NAD  Neck: No JVD  Respiratory: CTAB, no wheezes, rales or rhonchi  Cardiovascular: S1, S2, RRR  Gastrointestinal: BS+, soft, NT/ND  Extremities: No peripheral edema    Hospital Medications:   MEDICATIONS  (STANDING):  aspirin enteric coated 81 milliGRAM(s) Oral daily  atorvastatin 40 milliGRAM(s) Oral at bedtime  cyanocobalamin 500 MICROGram(s) Oral daily  dextrose 5%. 1000 milliLiter(s) (50 mL/Hr) IV Continuous <Continuous>  dextrose 50% Injectable 12.5 Gram(s) IV Push once  dextrose 50% Injectable 25 Gram(s) IV Push once  dextrose 50% Injectable 25 Gram(s) IV Push once  enoxaparin Injectable 40 milliGRAM(s) SubCutaneous every 24 hours  ergocalciferol 46459 Unit(s) Oral <User Schedule>  influenza   Vaccine 0.5 milliLiter(s) IntraMuscular once  insulin glargine Injectable (LANTUS) 16 Unit(s) SubCutaneous at bedtime  insulin lispro (HumaLOG) corrective regimen sliding scale   SubCutaneous three times a day before meals  insulin lispro (HumaLOG) corrective regimen sliding scale   SubCutaneous at bedtime  pyridoxine 100 milliGRAM(s) Oral daily      LABS:  10-05    140  |  102  |  6<L>  ----------------------------<  145<H>  3.5   |  25  |  0.58    Ca    8.6      05 Oct 2017 06:55  Phos  3.3     10-04  Mg     1.5     10-04      Creatinine Trend: 0.58 <--, 0.57 <--, 0.68 <--                                11.2   6.27  )-----------( 261      ( 05 Oct 2017 06:55 )             36.4     Urine Studies:      HbA1c 9.5      [10-04-17 @ 07:10]  TSH 1.13      [10-04-17 @ 07:10]  Lipid: chol 162, TG 68, HDL 49,       [10-03-17 @ 09:24]      Rheumatoid Factor < 7.0      [10-04-17 @ 16:34]    RADIOLOGY & ADDITIONAL STUDIES:

## 2017-10-05 NOTE — SWALLOW BEDSIDE ASSESSMENT ADULT - COMMENTS
The patient was seen this afternoon for initial assessment of swallow function, at which time she was alert and cooperative. The patient is denying any speech/language/swallowing difficulties at this time. Per charting, the patient is a 52 y/o female with a PMHx significant of HTN, HLD, DM, sarcoidosis and chronic back pain secondary to herniated discs in the cervical and lumbar spine. She presented to the [facility] ED with left facial droop and left sided weakness, found to have an early right basal ganglia infarction. Recent MRI of the head revealed, "Acute infarcts involving the head of the right caudate nucleus, right lentiform nucleus and scattered areas of the right frontal and parietal regions. No evidence of hemorrhagic transformation." Discussed results and recommendations from this evaluation with the patient, RN, and call out to TELE PA.

## 2017-10-05 NOTE — SWALLOW BEDSIDE ASSESSMENT ADULT - ASR SWALLOW ASPIRATION MONITOR
oral hygiene/fever/upper respiratory infection/change of breathing pattern/position upright (90Y)/throat clearing/gurgly voice/cough/pneumonia

## 2017-10-05 NOTE — DIETITIAN INITIAL EVALUATION ADULT. - ADHERENCE
fair/HbA1c 9.6, Pt was on oral medications + Lantus prior to admission  and reports f/s to be in the 200's mg/dl.  Pt states that low evening f/s readings would  result in higher fasting bloods glucose.

## 2017-10-05 NOTE — DIETITIAN INITIAL EVALUATION ADULT. - NS AS NUTRI INTERV ED CONTENT
Purpose of the nutrition education/recommended diet modifications reviewed./Priority modifications/Survival information

## 2017-10-05 NOTE — PROGRESS NOTE ADULT - PROBLEM SELECTOR PLAN 1
Patient is having uncontrolled HTN. She claims it to be for only 2 yrs. Admitted with CVA. BP was planned to be kept high for 24 hrs as permissive HTN after CVA. She was on clonidine at home but states she is feeling weak and tired while on it.   Her HTN is likely Primary but need to be R/O Renal artery stenosis. No hypokalemia or alkalosis to suggest Prim Hyperaldosteronism. No h/o attacks of anxiety/diaphoresis/palpitation  Start Losartan 50mg QD  Use Hydralazine 10mg IV q4hr PRN for SBP >200  Goal BP now is 160-170/.

## 2017-10-06 LAB
ANA TITR SER: NEGATIVE — SIGNIFICANT CHANGE UP
BUN SERPL-MCNC: 7 MG/DL — SIGNIFICANT CHANGE UP (ref 7–23)
CALCIUM SERPL-MCNC: 9.2 MG/DL — SIGNIFICANT CHANGE UP (ref 8.4–10.5)
CARDIOLIPIN IGM SER-MCNC: 3.91 MPL — SIGNIFICANT CHANGE UP (ref 0–11)
CARDIOLIPIN IGM SER-MCNC: 7.16 GPL — SIGNIFICANT CHANGE UP (ref 0–23)
CHLORIDE SERPL-SCNC: 101 MMOL/L — SIGNIFICANT CHANGE UP (ref 98–107)
CO2 SERPL-SCNC: 22 MMOL/L — SIGNIFICANT CHANGE UP (ref 22–31)
CREAT SERPL-MCNC: 0.7 MG/DL — SIGNIFICANT CHANGE UP (ref 0.5–1.3)
GLUCOSE SERPL-MCNC: 178 MG/DL — HIGH (ref 70–99)
HCT VFR BLD CALC: 42 % — SIGNIFICANT CHANGE UP (ref 34.5–45)
HGB BLD-MCNC: 13 G/DL — SIGNIFICANT CHANGE UP (ref 11.5–15.5)
MAGNESIUM SERPL-MCNC: 1.6 MG/DL — SIGNIFICANT CHANGE UP (ref 1.6–2.6)
MCHC RBC-ENTMCNC: 23.6 PG — LOW (ref 27–34)
MCHC RBC-ENTMCNC: 31 % — LOW (ref 32–36)
MCV RBC AUTO: 76.1 FL — LOW (ref 80–100)
NRBC # FLD: 0 — SIGNIFICANT CHANGE UP
PLATELET # BLD AUTO: 269 K/UL — SIGNIFICANT CHANGE UP (ref 150–400)
PMV BLD: SIGNIFICANT CHANGE UP FL (ref 7–13)
POTASSIUM SERPL-MCNC: 4.2 MMOL/L — SIGNIFICANT CHANGE UP (ref 3.5–5.3)
POTASSIUM SERPL-SCNC: 4.2 MMOL/L — SIGNIFICANT CHANGE UP (ref 3.5–5.3)
RBC # BLD: 5.52 M/UL — HIGH (ref 3.8–5.2)
RBC # FLD: SIGNIFICANT CHANGE UP % (ref 10.3–14.5)
SODIUM SERPL-SCNC: 138 MMOL/L — SIGNIFICANT CHANGE UP (ref 135–145)
WBC # BLD: 6.62 K/UL — SIGNIFICANT CHANGE UP (ref 3.8–10.5)
WBC # FLD AUTO: 6.62 K/UL — SIGNIFICANT CHANGE UP (ref 3.8–10.5)

## 2017-10-06 PROCEDURE — 93306 TTE W/DOPPLER COMPLETE: CPT | Mod: 26

## 2017-10-06 RX ORDER — HYDRALAZINE HCL 50 MG
25 TABLET ORAL THREE TIMES A DAY
Qty: 0 | Refills: 0 | Status: DISCONTINUED | OUTPATIENT
Start: 2017-10-06 | End: 2017-10-06

## 2017-10-06 RX ORDER — HYDRALAZINE HCL 50 MG
10 TABLET ORAL ONCE
Qty: 0 | Refills: 0 | Status: COMPLETED | OUTPATIENT
Start: 2017-10-06 | End: 2017-10-06

## 2017-10-06 RX ORDER — NIFEDIPINE 30 MG
30 TABLET, EXTENDED RELEASE 24 HR ORAL DAILY
Qty: 0 | Refills: 0 | Status: DISCONTINUED | OUTPATIENT
Start: 2017-10-06 | End: 2017-10-10

## 2017-10-06 RX ORDER — SODIUM CHLORIDE 9 MG/ML
1000 INJECTION INTRAMUSCULAR; INTRAVENOUS; SUBCUTANEOUS
Qty: 0 | Refills: 0 | Status: DISCONTINUED | OUTPATIENT
Start: 2017-10-06 | End: 2017-10-10

## 2017-10-06 RX ADMIN — Medication 30 MILLIGRAM(S): at 17:26

## 2017-10-06 RX ADMIN — ATORVASTATIN CALCIUM 80 MILLIGRAM(S): 80 TABLET, FILM COATED ORAL at 22:16

## 2017-10-06 RX ADMIN — INSULIN GLARGINE 16 UNIT(S): 100 INJECTION, SOLUTION SUBCUTANEOUS at 22:17

## 2017-10-06 RX ADMIN — LOSARTAN POTASSIUM 50 MILLIGRAM(S): 100 TABLET, FILM COATED ORAL at 05:31

## 2017-10-06 RX ADMIN — ENOXAPARIN SODIUM 40 MILLIGRAM(S): 100 INJECTION SUBCUTANEOUS at 22:17

## 2017-10-06 RX ADMIN — Medication 81 MILLIGRAM(S): at 13:26

## 2017-10-06 RX ADMIN — PREGABALIN 500 MICROGRAM(S): 225 CAPSULE ORAL at 13:26

## 2017-10-06 RX ADMIN — Medication 100 MILLIGRAM(S): at 13:25

## 2017-10-06 RX ADMIN — Medication 10 MILLIGRAM(S): at 19:40

## 2017-10-06 RX ADMIN — Medication 1: at 09:26

## 2017-10-06 RX ADMIN — Medication 25 MILLIGRAM(S): at 13:28

## 2017-10-06 NOTE — CHART NOTE - NSCHARTNOTEFT_GEN_A_CORE
Notified by RN pt w Uncontrolled HTN /118 mmHg, repeat BP performed manually left arm 186/99 mmHg, & Right arm 182/98 mmHg. Patient asymptomatic  Patient admitted with acute CVA - on Losartan for BP control, monitoring with  Permissive HTN parameters in setting of acute CVA   Discussed above BP readings with Neurology on call -continue to monitor     VITAL SIGNS:  Vital Signs Last 24 Hrs  T(C): 37.2 (05 Oct 2017 20:35), Max: 37.2 (05 Oct 2017 20:35)  T(F): 98.9 (05 Oct 2017 20:35), Max: 98.9 (05 Oct 2017 20:35)  HR: 85 (05 Oct 2017 20:35) (60 - 85)  BP: 182/98 (05 Oct 2017 22:45) (163/130 - 219/105)    RR: 18 (05 Oct 2017 20:35) (18 - 18)  SpO2: 99% (05 Oct 2017 20:35) (95% - 99%)    Allergies penicillin (Rash)      MEDICATIONS  (STANDING):  aspirin enteric coated 81 milliGRAM(s) Oral daily  atorvastatin 80 milliGRAM(s) Oral at bedtime  cyanocobalamin 500 MICROGram(s) Oral daily  dextrose 5%. 1000 milliLiter(s) (50 mL/Hr) IV Continuous <Continuous>  dextrose 50% Injectable 12.5 Gram(s) IV Push once  dextrose 50% Injectable 25 Gram(s) IV Push once  dextrose 50% Injectable 25 Gram(s) IV Push once  enoxaparin Injectable 40 milliGRAM(s) SubCutaneous every 24 hours  ergocalciferol 16818 Unit(s) Oral <User Schedule>  influenza   Vaccine 0.5 milliLiter(s) IntraMuscular once  insulin glargine Injectable (LANTUS) 16 Unit(s) SubCutaneous at bedtime  insulin lispro (HumaLOG) corrective regimen sliding scale   SubCutaneous three times a day before meals  insulin lispro (HumaLOG) corrective regimen sliding scale   SubCutaneous at bedtime  losartan 50 milliGRAM(s) Oral daily  pyridoxine 100 milliGRAM(s) Oral daily    MEDICATIONS  (PRN):  acetaminophen   Tablet. 650 milliGRAM(s) Oral every 6 hours PRN Mild and moderate pain  dextrose Gel 1 Dose(s) Oral once PRN Blood Glucose LESS THAN 70 milliGRAM(s)/deciliter  glucagon  Injectable 1 milliGRAM(s) IntraMuscular once PRN Glucose LESS THAN 70 milligrams/deciliter                            11.2   6.27  )-----------( 261      ( 05 Oct 2017 06:55 )             36.4     10-05    140  |  102  |  6<L>  ----------------------------<  145<H>  3.5   |  25  |  0.58    Ca    8.6      05 Oct 2017 06:55  Phos  3.3     10-04  Mg     1.5     10-04      CAPILLARY BLOOD GLUCOSE  205 (05 Oct 2017 21:16)  162 (05 Oct 2017 16:59)  148 (05 Oct 2017 12:04)  132 (05 Oct 2017 08:57)

## 2017-10-06 NOTE — PROGRESS NOTE ADULT - SUBJECTIVE AND OBJECTIVE BOX
INTERVAL HISTORY:  	  MEDICATIONS:  aspirin enteric coated 81 milliGRAM(s) Oral daily  enoxaparin Injectable 40 milliGRAM(s) SubCutaneous every 24 hours  losartan 50 milliGRAM(s) Oral daily        acetaminophen   Tablet. 650 milliGRAM(s) Oral every 6 hours PRN      atorvastatin 80 milliGRAM(s) Oral at bedtime  dextrose 50% Injectable 12.5 Gram(s) IV Push once  dextrose 50% Injectable 25 Gram(s) IV Push once  dextrose 50% Injectable 25 Gram(s) IV Push once  dextrose Gel 1 Dose(s) Oral once PRN  glucagon  Injectable 1 milliGRAM(s) IntraMuscular once PRN  insulin glargine Injectable (LANTUS) 16 Unit(s) SubCutaneous at bedtime  insulin lispro (HumaLOG) corrective regimen sliding scale   SubCutaneous three times a day before meals  insulin lispro (HumaLOG) corrective regimen sliding scale   SubCutaneous at bedtime    cyanocobalamin 500 MICROGram(s) Oral daily  dextrose 5%. 1000 milliLiter(s) IV Continuous <Continuous>  ergocalciferol 39567 Unit(s) Oral <User Schedule>  influenza   Vaccine 0.5 milliLiter(s) IntraMuscular once  pyridoxine 100 milliGRAM(s) Oral daily      PHYSICAL EXAM:  T(C): 36.7 (10-06-17 @ 05:29), Max: 37.2 (10-05-17 @ 20:35)  HR: 74 (10-06-17 @ 05:29) (74 - 85)  BP: 161/101 (10-06-17 @ 05:29) (161/101 - 219/105)  RR: 18 (10-06-17 @ 05:29) (18 - 18)  SpO2: 99% (10-06-17 @ 05:29) (97% - 99%)  Wt(kg): --  I&O's Summary        Appearance: Normal	  HEENT:   Normal oral mucosa, PERRL, EOMI	  Cardiovascular: Normal S1 S2, No JVD, No murmurs, No edema  Respiratory: Lungs clear to auscultation	  Gastrointestinal:  Soft, Non-tender, + BS	  Extremities: Normal range of motion, No clubbing, cyanosis or edema                                    13.0   6.62  )-----------( 269      ( 06 Oct 2017 07:30 )             42.0     10-06    138  |  101  |  7   ----------------------------<  178<H>  4.2   |  22  |  0.70    Ca    9.2      06 Oct 2017 07:30  Mg     1.6     10-06      proBNP:   Lipid Profile:   HgA1c:   TSH:

## 2017-10-06 NOTE — PROGRESS NOTE ADULT - ASSESSMENT
EKG - normal sinus rhythm   Nuclear stress test 2015- normal LV no ischemia    a/p     1) Acute right basal ganglia stroke - no carotid stenosis, HTN elevated started on losartan 50mg if remains increased increase to 100mg QD, get echo with bubble study, no signnificant arrythmias on tele, will monite cont asa lipitor, neurology follow up     2) Chest pain - atypical, EKG and cardiac enzymes negative, get 2d echo    3) DVT prophylasix - on lovenox

## 2017-10-06 NOTE — PROGRESS NOTE ADULT - ASSESSMENT
50 y/o obese female with a PMHx of HTN, HLD, DM, sarcoidosis and chronic back pain secondary to herniated discs in the cervical and lumbar spine presents to ED with left facial droop and left sided weakness. Admitted with CVA. I got involved for uncontrolled HTN

## 2017-10-06 NOTE — CHART NOTE - NSCHARTNOTEFT_GEN_A_CORE
Per Renal recomm CT angio of abd was ordered to r/o RADHA received a call from Radiology with recommendation to order MRAngio Abd with / & w/o contrast - MRA renal study to r/o RADHA - ordered  d/w Dr Jj & Dr Beltran above recs and plan Per Renal recomm CT angio of abd was ordered to r/o RADHA received a call from Radiology with recommendation to order MRAngio Abd with / & w/o contrast - MRA renal study to r/o RADHA - ordered  d/w Dr Jj & Dr Beltran above recs and plan    Addendum Note:  Patient has persistent uncontrolled HTN, pt remains asymptomatic,  w/u for secondary causes of HTN are in progress - Renal recs appreciated - Hydralazine 10 mg IVP x 1 ordered for BP control. will continue to monitor      VITAL SIGNS:  Vital Signs Last 24 Hrs  T(C): 36.8 (07 Oct 2017 02:14), Max: 37 (06 Oct 2017 14:30)  T(F): 98.3 (07 Oct 2017 02:14), Max: 98.6 (06 Oct 2017 14:30)  HR: 101 (07 Oct 2017 02:14) (68 - 133)  BP: 172/100 (07 Oct 2017 02:14) (161/101 - 198/110)    RR: 18 (07 Oct 2017 02:14) (16 - 18)  SpO2: 98% (07 Oct 2017 02:14) (98% - 99%)      Allergies penicillin (Rash)    MEDICATIONS  (STANDING):  aspirin enteric coated 81 milliGRAM(s) Oral daily  atorvastatin 80 milliGRAM(s) Oral at bedtime  cyanocobalamin 500 MICROGram(s) Oral daily  dextrose 5%. 1000 milliLiter(s) (50 mL/Hr) IV Continuous <Continuous>  dextrose 50% Injectable 12.5 Gram(s) IV Push once  dextrose 50% Injectable 25 Gram(s) IV Push once  dextrose 50% Injectable 25 Gram(s) IV Push once  enoxaparin Injectable 40 milliGRAM(s) SubCutaneous every 24 hours  ergocalciferol 62378 Unit(s) Oral <User Schedule>  hydrALAZINE Injectable 10 milliGRAM(s) IV Push once  influenza   Vaccine 0.5 milliLiter(s) IntraMuscular once  insulin glargine Injectable (LANTUS) 16 Unit(s) SubCutaneous at bedtime  insulin lispro (HumaLOG) corrective regimen sliding scale   SubCutaneous three times a day before meals  insulin lispro (HumaLOG) corrective regimen sliding scale   SubCutaneous at bedtime  losartan 50 milliGRAM(s) Oral daily  NIFEdipine XL 30 milliGRAM(s) Oral daily  pyridoxine 100 milliGRAM(s) Oral daily  sodium chloride 0.9%. 1000 milliLiter(s) (75 mL/Hr) IV Continuous <Continuous>    MEDICATIONS  (PRN):  acetaminophen   Tablet. 650 milliGRAM(s) Oral every 6 hours PRN Mild and moderate pain  dextrose Gel 1 Dose(s) Oral once PRN Blood Glucose LESS THAN 70 milliGRAM(s)/deciliter  glucagon  Injectable 1 milliGRAM(s) IntraMuscular once PRN Glucose LESS THAN 70 milligrams/deciliter                            13.0   6.62  )-----------( 269      ( 06 Oct 2017 07:30 )             42.0     10-06    138  |  101  |  7   ----------------------------<  178<H>  4.2   |  22  |  0.70    Ca    9.2      06 Oct 2017 07:30  Mg     1.6     10-06            CAPILLARY BLOOD GLUCOSE  203 (06 Oct 2017 21:13)  204 (06 Oct 2017 17:01)  132 (06 Oct 2017 12:10)  157 (06 Oct 2017 08:58)

## 2017-10-06 NOTE — PROGRESS NOTE ADULT - SUBJECTIVE AND OBJECTIVE BOX
Dr. Jj (Nephrology)  Office [phone]  Cell [phone]  Inge RODRIGUEZ  Cell [phone]      Patient is a 51y old  Female who presents with a chief complaint of Facial droop, weakness (04 Oct 2017 08:30)      Patient seen and examined at bedside. No chest pain/sob    VITALS:  T(F): 98 (10-06-17 @ 05:29), Max: 98.9 (10-05-17 @ 20:35)  HR: 74 (10-06-17 @ 05:29)  BP: 161/101 (10-06-17 @ 05:29)  RR: 18 (10-06-17 @ 05:29)  SpO2: 99% (10-06-17 @ 05:29)  Wt(kg): --        PHYSICAL EXAM:  Constitutional: NAD  Neck: No JVD  Respiratory: CTAB, no wheezes, rales or rhonchi  Cardiovascular: S1, S2, RRR  Gastrointestinal: BS+, soft, NT/ND  Extremities: No peripheral edema    Hospital Medications:   MEDICATIONS  (STANDING):  aspirin enteric coated 81 milliGRAM(s) Oral daily  atorvastatin 80 milliGRAM(s) Oral at bedtime  cyanocobalamin 500 MICROGram(s) Oral daily  dextrose 5%. 1000 milliLiter(s) (50 mL/Hr) IV Continuous <Continuous>  dextrose 50% Injectable 12.5 Gram(s) IV Push once  dextrose 50% Injectable 25 Gram(s) IV Push once  dextrose 50% Injectable 25 Gram(s) IV Push once  enoxaparin Injectable 40 milliGRAM(s) SubCutaneous every 24 hours  ergocalciferol 27516 Unit(s) Oral <User Schedule>  hydrALAZINE 25 milliGRAM(s) Oral three times a day  influenza   Vaccine 0.5 milliLiter(s) IntraMuscular once  insulin glargine Injectable (LANTUS) 16 Unit(s) SubCutaneous at bedtime  insulin lispro (HumaLOG) corrective regimen sliding scale   SubCutaneous three times a day before meals  insulin lispro (HumaLOG) corrective regimen sliding scale   SubCutaneous at bedtime  losartan 50 milliGRAM(s) Oral daily  pyridoxine 100 milliGRAM(s) Oral daily      LABS:  10-06    138  |  101  |  7   ----------------------------<  178<H>  4.2   |  22  |  0.70    Ca    9.2      06 Oct 2017 07:30  Mg     1.6     10-06      Creatinine Trend: 0.70 <--, 0.58 <--, 0.57 <--, 0.68 <--                                13.0   6.62  )-----------( 269      ( 06 Oct 2017 07:30 )             42.0     Urine Studies:      HbA1c 9.5      [10-04-17 @ 07:10]  TSH 1.13      [10-04-17 @ 07:10]  Lipid: chol 162, TG 68, HDL 49,       [10-03-17 @ 09:24]      SALTY: titer Negative, pattern --      [10-04-17 @ 16:34]  Rheumatoid Factor < 7.0      [10-04-17 @ 16:34]    RADIOLOGY & ADDITIONAL STUDIES:

## 2017-10-06 NOTE — PROGRESS NOTE ADULT - PROBLEM SELECTOR PLAN 1
Patient is having uncontrolled HTN. She claims it to be for only 2 yrs. Admitted with CVA. BP was planned to be kept high for 24 hrs as permissive HTN after CVA. She was on clonidine at home but states she is feeling weak and tired while on it.   Her HTN is likely Primary but need to be R/O Renal artery stenosis. renal artery not well visualized, consider CT angio. Please hydrate with NS 75cc/hr 6 hrs before and after.   no hypokalemia or alkalosis to suggest Prim Hyperaldosteronism. No h/o attacks of anxiety/diaphoresis/palpitation  continue Losartan 50mg QD  start nifedipine 30 mg qd    Use Hydralazine 10mg IV q4hr PRN for SBP >200  Goal -160/90

## 2017-10-07 LAB
BUN SERPL-MCNC: 9 MG/DL — SIGNIFICANT CHANGE UP (ref 7–23)
CALCIUM SERPL-MCNC: 9 MG/DL — SIGNIFICANT CHANGE UP (ref 8.4–10.5)
CHLORIDE SERPL-SCNC: 100 MMOL/L — SIGNIFICANT CHANGE UP (ref 98–107)
CO2 SERPL-SCNC: 22 MMOL/L — SIGNIFICANT CHANGE UP (ref 22–31)
CREAT SERPL-MCNC: 0.65 MG/DL — SIGNIFICANT CHANGE UP (ref 0.5–1.3)
GLUCOSE SERPL-MCNC: 224 MG/DL — HIGH (ref 70–99)
HCT VFR BLD CALC: 41.5 % — SIGNIFICANT CHANGE UP (ref 34.5–45)
HGB BLD-MCNC: 12.9 G/DL — SIGNIFICANT CHANGE UP (ref 11.5–15.5)
MAGNESIUM SERPL-MCNC: 1.5 MG/DL — LOW (ref 1.6–2.6)
MCHC RBC-ENTMCNC: 23.7 PG — LOW (ref 27–34)
MCHC RBC-ENTMCNC: 31.1 % — LOW (ref 32–36)
MCV RBC AUTO: 76.1 FL — LOW (ref 80–100)
NRBC # FLD: 0 — SIGNIFICANT CHANGE UP
PLATELET # BLD AUTO: 320 K/UL — SIGNIFICANT CHANGE UP (ref 150–400)
PMV BLD: 10.9 FL — SIGNIFICANT CHANGE UP (ref 7–13)
POTASSIUM SERPL-MCNC: 3.7 MMOL/L — SIGNIFICANT CHANGE UP (ref 3.5–5.3)
POTASSIUM SERPL-SCNC: 3.7 MMOL/L — SIGNIFICANT CHANGE UP (ref 3.5–5.3)
RBC # BLD: 5.45 M/UL — HIGH (ref 3.8–5.2)
RBC # FLD: 16 % — HIGH (ref 10.3–14.5)
SODIUM SERPL-SCNC: 138 MMOL/L — SIGNIFICANT CHANGE UP (ref 135–145)
WBC # BLD: 8.61 K/UL — SIGNIFICANT CHANGE UP (ref 3.8–10.5)
WBC # FLD AUTO: 8.61 K/UL — SIGNIFICANT CHANGE UP (ref 3.8–10.5)

## 2017-10-07 RX ORDER — HYDRALAZINE HCL 50 MG
10 TABLET ORAL ONCE
Qty: 0 | Refills: 0 | Status: COMPLETED | OUTPATIENT
Start: 2017-10-07 | End: 2017-10-07

## 2017-10-07 RX ORDER — METOPROLOL TARTRATE 50 MG
25 TABLET ORAL
Qty: 0 | Refills: 0 | Status: DISCONTINUED | OUTPATIENT
Start: 2017-10-07 | End: 2017-10-08

## 2017-10-07 RX ADMIN — ENOXAPARIN SODIUM 40 MILLIGRAM(S): 100 INJECTION SUBCUTANEOUS at 21:52

## 2017-10-07 RX ADMIN — Medication 1: at 17:31

## 2017-10-07 RX ADMIN — Medication 10 MILLIGRAM(S): at 15:40

## 2017-10-07 RX ADMIN — Medication 81 MILLIGRAM(S): at 11:12

## 2017-10-07 RX ADMIN — PREGABALIN 500 MICROGRAM(S): 225 CAPSULE ORAL at 11:12

## 2017-10-07 RX ADMIN — LOSARTAN POTASSIUM 50 MILLIGRAM(S): 100 TABLET, FILM COATED ORAL at 06:00

## 2017-10-07 RX ADMIN — Medication 25 MILLIGRAM(S): at 17:07

## 2017-10-07 RX ADMIN — Medication 10 MILLIGRAM(S): at 02:28

## 2017-10-07 RX ADMIN — INSULIN GLARGINE 16 UNIT(S): 100 INJECTION, SOLUTION SUBCUTANEOUS at 22:50

## 2017-10-07 RX ADMIN — Medication 30 MILLIGRAM(S): at 06:00

## 2017-10-07 RX ADMIN — Medication 2: at 13:05

## 2017-10-07 RX ADMIN — Medication 2: at 09:38

## 2017-10-07 RX ADMIN — ATORVASTATIN CALCIUM 80 MILLIGRAM(S): 80 TABLET, FILM COATED ORAL at 21:52

## 2017-10-07 RX ADMIN — Medication 100 MILLIGRAM(S): at 11:12

## 2017-10-07 NOTE — PROGRESS NOTE ADULT - SUBJECTIVE AND OBJECTIVE BOX
INTERVAL HISTORY: pt is lying in bed comfortable, not in distress, no chest pain no SOB  	  MEDICATIONS:  aspirin enteric coated 81 milliGRAM(s) Oral daily  enoxaparin Injectable 40 milliGRAM(s) SubCutaneous every 24 hours  losartan 50 milliGRAM(s) Oral daily  NIFEdipine XL 30 milliGRAM(s) Oral daily  acetaminophen   Tablet. 650 milliGRAM(s) Oral every 6 hours PRN  atorvastatin 80 milliGRAM(s) Oral at bedtime  dextrose 50% Injectable 12.5 Gram(s) IV Push once  dextrose 50% Injectable 25 Gram(s) IV Push once  dextrose 50% Injectable 25 Gram(s) IV Push once  dextrose Gel 1 Dose(s) Oral once PRN  glucagon  Injectable 1 milliGRAM(s) IntraMuscular once PRN  insulin glargine Injectable (LANTUS) 16 Unit(s) SubCutaneous at bedtime  insulin lispro (HumaLOG) corrective regimen sliding scale   SubCutaneous three times a day before meals  insulin lispro (HumaLOG) corrective regimen sliding scale   SubCutaneous at bedtime  cyanocobalamin 500 MICROGram(s) Oral daily  dextrose 5%. 1000 milliLiter(s) IV Continuous <Continuous>  ergocalciferol 73568 Unit(s) Oral <User Schedule>  influenza   Vaccine 0.5 milliLiter(s) IntraMuscular once  pyridoxine 100 milliGRAM(s) Oral daily  sodium chloride 0.9%. 1000 milliLiter(s) IV Continuous <Continuous>      PHYSICAL EXAM:  T(C): 36.7 (10-07-17 @ 05:55), Max: 37 (10-06-17 @ 14:30)  HR: 98 (10-07-17 @ 05:55) (68 - 133)  BP: 141/88 (10-07-17 @ 05:55) (141/88 - 198/110)  RR: 18 (10-07-17 @ 05:55) (16 - 18)  SpO2: 99% (10-07-17 @ 05:55) (98% - 99%)  Wt(kg): --  I&O's Summary        Appearance: Normal	  HEENT:   Normal oral mucosa, PERRL, EOMI	  Cardiovascular: Normal S1 S2, No JVD, No murmurs, No edema  Respiratory: Lungs clear to auscultation	  Gastrointestinal:  Soft, Non-tender, + BS	  Extremities: Normal range of motion, No clubbing, cyanosis or edema                                    12.9   8.61  )-----------( 320      ( 07 Oct 2017 06:55 )             41.5     10-07    138  |  100  |  9   ----------------------------<  224<H>  3.7   |  x   |  0.65    Ca    9.0      07 Oct 2017 06:55  Mg     1.5     10-07      proBNP:   Lipid Profile:   HgA1c:   TSH:

## 2017-10-07 NOTE — PROGRESS NOTE ADULT - SUBJECTIVE AND OBJECTIVE BOX
STUART KIME  51y  Patient is a 51y old  Female who presents with a chief complaint of Facial droop, weakness (04 Oct 2017 08:30)    HPI:  5This is a patient whon was admitted with CVA and uncontrolled Hypertension. She is being worked up for secondary Hypertension. She feels better.  HEALTH ISSUES - PROBLEM Dx:  Hypomagnesemia: Hypomagnesemia  Need for prophylactic measure: Need for prophylactic measure  DM (diabetes mellitus): DM (diabetes mellitus)  HLD (hyperlipidemia): HLD (hyperlipidemia)  HTN (hypertension): HTN (hypertension)  Chest pain: Chest pain  CVA (cerebral vascular accident): CVA (cerebral vascular accident)  Weakness of left side of body: Weakness of left side of body        MEDICATIONS  (STANDING):  aspirin enteric coated 81 milliGRAM(s) Oral daily  atorvastatin 80 milliGRAM(s) Oral at bedtime  cyanocobalamin 500 MICROGram(s) Oral daily  dextrose 5%. 1000 milliLiter(s) (50 mL/Hr) IV Continuous <Continuous>  dextrose 50% Injectable 12.5 Gram(s) IV Push once  dextrose 50% Injectable 25 Gram(s) IV Push once  dextrose 50% Injectable 25 Gram(s) IV Push once  enoxaparin Injectable 40 milliGRAM(s) SubCutaneous every 24 hours  ergocalciferol 69010 Unit(s) Oral <User Schedule>  influenza   Vaccine 0.5 milliLiter(s) IntraMuscular once  insulin glargine Injectable (LANTUS) 16 Unit(s) SubCutaneous at bedtime  insulin lispro (HumaLOG) corrective regimen sliding scale   SubCutaneous three times a day before meals  insulin lispro (HumaLOG) corrective regimen sliding scale   SubCutaneous at bedtime  losartan 50 milliGRAM(s) Oral daily  NIFEdipine XL 30 milliGRAM(s) Oral daily  pyridoxine 100 milliGRAM(s) Oral daily  sodium chloride 0.9%. 1000 milliLiter(s) (75 mL/Hr) IV Continuous <Continuous>    MEDICATIONS  (PRN):  acetaminophen   Tablet. 650 milliGRAM(s) Oral every 6 hours PRN Mild and moderate pain  dextrose Gel 1 Dose(s) Oral once PRN Blood Glucose LESS THAN 70 milliGRAM(s)/deciliter  glucagon  Injectable 1 milliGRAM(s) IntraMuscular once PRN Glucose LESS THAN 70 milligrams/deciliter    Vital Signs Last 24 Hrs  T(C): 36.7 (07 Oct 2017 05:55), Max: 37 (06 Oct 2017 14:30)  T(F): 98.1 (07 Oct 2017 05:55), Max: 98.6 (06 Oct 2017 14:30)  HR: 98 (07 Oct 2017 05:55) (68 - 133)  BP: 141/88 (07 Oct 2017 05:55) (141/88 - 198/110)  BP(mean): --  RR: 18 (07 Oct 2017 05:55) (16 - 18)  SpO2: 99% (07 Oct 2017 05:55) (98% - 99%)  Daily     Daily     PHYSICAL EXAM:  Constitutional: She  appears comfortable and not distressed. Not diaphoretic.    Neck:  The thyroid is normal. Trachea is midline.     Breasts: Normal examination.    Respiratory: The lungs are clear to auscultation. No dullness and expansion is normal.    Cardiovascular: S1 and S2 are normal. No mummurs, rubs or gallops are present.    Gastrointestinal: The abdomen is soft. No tenderness is present. No masses are present. Bowel sounds are normal.    Genitourinary: The bladder is not distended. No CVA tenderness is present.    Extremities: No edema is noted. No deformities are present.    Neurological: Cognition is normal. Tone, power and sensation are normal. Gait is steady.    Skin: No leasions are seen  or palpated.    Lymph Nodes: No lymphadenopathy is present.    Psychiatric: Mood is appropriate. No hallucinations or flight of ideas are noted.                              12.9   8.61  )-----------( 320      ( 07 Oct 2017 06:55 )             41.5     10-07    138  |  100  |  9   ----------------------------<  224<H>  3.7   |  22  |  0.65    Ca    9.0      07 Oct 2017 06:55  Mg     1.5     10-07    Renal Duplex unable to visualize the kidneys.

## 2017-10-07 NOTE — PROGRESS NOTE ADULT - PROBLEM SELECTOR PLAN 1
MRI with scattered cva  neuro f/u   frequent neuro checks  echo with concentric lvh , carotid dopplers , r/o vasculitis

## 2017-10-07 NOTE — PROGRESS NOTE ADULT - ASSESSMENT
EKG - normal sinus rhythm   Nuclear stress test 2015- normal LV no ischemia    a/p     1) Acute right basal ganglia stroke - no carotid stenosis, start lopressor 25mg q12 , Echo shows normal LV function no signnificant arrythmias on tele, will monite cont asa lipitor, neurology follow up     2) Chest pain - atypical, EKG and cardiac enzymes negative, Echo shows normal LV    3) DVT prophylasix - on lovenox

## 2017-10-07 NOTE — PROGRESS NOTE ADULT - ASSESSMENT
1.	Hypertension, im[proved. She is being worked up for secondary. She is obese and has borderline low K.  2.	CVA.    PLAN:  1.	Continue current meds.  2.	Renin and matthew levels.  3.	Follow up BMP.    Toro Delgado MD  [phone]

## 2017-10-07 NOTE — PROGRESS NOTE ADULT - SUBJECTIVE AND OBJECTIVE BOX
chief complaint : facial droop, weakness    SUBJECTIVE / OVERNIGHT EVENTS: pt denies chest pain, shortness of breath, nausea,  v    MEDICATIONS  (STANDING):  aspirin enteric coated 81 milliGRAM(s) Oral daily  atorvastatin 80 milliGRAM(s) Oral at bedtime  cyanocobalamin 500 MICROGram(s) Oral daily  dextrose 5%. 1000 milliLiter(s) (50 mL/Hr) IV Continuous <Continuous>  dextrose 50% Injectable 12.5 Gram(s) IV Push once  dextrose 50% Injectable 25 Gram(s) IV Push once  dextrose 50% Injectable 25 Gram(s) IV Push once  enoxaparin Injectable 40 milliGRAM(s) SubCutaneous every 24 hours  ergocalciferol 88780 Unit(s) Oral <User Schedule>  influenza   Vaccine 0.5 milliLiter(s) IntraMuscular once  insulin glargine Injectable (LANTUS) 16 Unit(s) SubCutaneous at bedtime  insulin lispro (HumaLOG) corrective regimen sliding scale   SubCutaneous three times a day before meals  insulin lispro (HumaLOG) corrective regimen sliding scale   SubCutaneous at bedtime  losartan 50 milliGRAM(s) Oral daily  metoprolol 25 milliGRAM(s) Oral two times a day  NIFEdipine XL 30 milliGRAM(s) Oral daily  pyridoxine 100 milliGRAM(s) Oral daily  sodium chloride 0.9%. 1000 milliLiter(s) (75 mL/Hr) IV Continuous <Continuous>    MEDICATIONS  (PRN):  acetaminophen   Tablet. 650 milliGRAM(s) Oral every 6 hours PRN Mild and moderate pain  dextrose Gel 1 Dose(s) Oral once PRN Blood Glucose LESS THAN 70 milliGRAM(s)/deciliter  glucagon  Injectable 1 milliGRAM(s) IntraMuscular once PRN Glucose LESS THAN 70 milligrams/deciliter    Vital Signs Last 24 Hrs  T(C): 37.2 (07 Oct 2017 15:13), Max: 37.2 (07 Oct 2017 15:13)  T(F): 99 (07 Oct 2017 15:13), Max: 99 (07 Oct 2017 15:13)  HR: 99 (07 Oct 2017 17:06) (93 - 101)  BP: 158/79 (07 Oct 2017 17:06) (141/88 - 194/108)  BP(mean): --  RR: 19 (07 Oct 2017 15:13) (17 - 19)  SpO2: 100% (07 Oct 2017 15:13) (98% - 100%)    Constitutional: No fever, fatigue  Skin: No rash.  Eyes: No recent vision problems or eye pain.  ENT: No congestion, ear pain, or sore throat.  Cardiovascular: No chest pain or palpation.  Respiratory: No cough, shortness of breath, congestion, or wheezing.  Gastrointestinal: No abdominal pain, nausea, vomiting, or diarrhea.  Genitourinary: No dysuria.  Musculoskeletal: No joint swelling.  Neurologic: No headache.    PHYSICAL EXAM:  GENERAL: NAD  EYES: EOMI, PERRLA  NECK: Supple, No JVD  CHEST/LUNG: dec breath sounds at bases  HEART:  S1 , S2 +  ABDOMEN: soft, bs+  EXTREMITIES:  no edema  NEUROLOGY: alert awake oriented , no neck stiffness    LABS:  10-07    138  |  100  |  9   ----------------------------<  224<H>  3.7   |  22  |  0.65    Ca    9.0      07 Oct 2017 06:55  Mg     1.5     10-07      Creatinine Trend: 0.65 <--, 0.70 <--, 0.58 <--, 0.57 <--, 0.68 <--                        12.9   8.61  )-----------( 320      ( 07 Oct 2017 06:55 )             41.5     Urine Studies:

## 2017-10-08 LAB
BUN SERPL-MCNC: 13 MG/DL — SIGNIFICANT CHANGE UP (ref 7–23)
CALCIUM SERPL-MCNC: 8.9 MG/DL — SIGNIFICANT CHANGE UP (ref 8.4–10.5)
CHLORIDE SERPL-SCNC: 100 MMOL/L — SIGNIFICANT CHANGE UP (ref 98–107)
CO2 SERPL-SCNC: 24 MMOL/L — SIGNIFICANT CHANGE UP (ref 22–31)
CREAT SERPL-MCNC: 0.7 MG/DL — SIGNIFICANT CHANGE UP (ref 0.5–1.3)
GLUCOSE SERPL-MCNC: 198 MG/DL — HIGH (ref 70–99)
HCT VFR BLD CALC: 40.7 % — SIGNIFICANT CHANGE UP (ref 34.5–45)
HGB BLD-MCNC: 12.3 G/DL — SIGNIFICANT CHANGE UP (ref 11.5–15.5)
MAGNESIUM SERPL-MCNC: 1.6 MG/DL — SIGNIFICANT CHANGE UP (ref 1.6–2.6)
MCHC RBC-ENTMCNC: 23 PG — LOW (ref 27–34)
MCHC RBC-ENTMCNC: 30.2 % — LOW (ref 32–36)
MCV RBC AUTO: 76.1 FL — LOW (ref 80–100)
NRBC # FLD: 0 — SIGNIFICANT CHANGE UP
PLATELET # BLD AUTO: 309 K/UL — SIGNIFICANT CHANGE UP (ref 150–400)
PMV BLD: 11 FL — SIGNIFICANT CHANGE UP (ref 7–13)
POTASSIUM SERPL-MCNC: 3.6 MMOL/L — SIGNIFICANT CHANGE UP (ref 3.5–5.3)
POTASSIUM SERPL-SCNC: 3.6 MMOL/L — SIGNIFICANT CHANGE UP (ref 3.5–5.3)
RBC # BLD: 5.35 M/UL — HIGH (ref 3.8–5.2)
RBC # FLD: 15.9 % — HIGH (ref 10.3–14.5)
SODIUM SERPL-SCNC: 138 MMOL/L — SIGNIFICANT CHANGE UP (ref 135–145)
WBC # BLD: 8.06 K/UL — SIGNIFICANT CHANGE UP (ref 3.8–10.5)
WBC # FLD AUTO: 8.06 K/UL — SIGNIFICANT CHANGE UP (ref 3.8–10.5)

## 2017-10-08 RX ORDER — METOPROLOL TARTRATE 50 MG
50 TABLET ORAL
Qty: 0 | Refills: 0 | Status: DISCONTINUED | OUTPATIENT
Start: 2017-10-08 | End: 2017-10-10

## 2017-10-08 RX ORDER — SODIUM CHLORIDE 9 MG/ML
1000 INJECTION INTRAMUSCULAR; INTRAVENOUS; SUBCUTANEOUS
Qty: 0 | Refills: 0 | Status: CANCELLED | OUTPATIENT
Start: 2018-09-05 | End: 2017-10-10

## 2017-10-08 RX ADMIN — Medication 30 MILLIGRAM(S): at 05:26

## 2017-10-08 RX ADMIN — ATORVASTATIN CALCIUM 80 MILLIGRAM(S): 80 TABLET, FILM COATED ORAL at 21:37

## 2017-10-08 RX ADMIN — INSULIN GLARGINE 16 UNIT(S): 100 INJECTION, SOLUTION SUBCUTANEOUS at 22:23

## 2017-10-08 RX ADMIN — Medication: at 18:32

## 2017-10-08 RX ADMIN — Medication 1: at 09:14

## 2017-10-08 RX ADMIN — Medication 25 MILLIGRAM(S): at 05:26

## 2017-10-08 RX ADMIN — PREGABALIN 500 MICROGRAM(S): 225 CAPSULE ORAL at 11:48

## 2017-10-08 RX ADMIN — Medication 2: at 12:45

## 2017-10-08 RX ADMIN — Medication 81 MILLIGRAM(S): at 11:48

## 2017-10-08 RX ADMIN — LOSARTAN POTASSIUM 50 MILLIGRAM(S): 100 TABLET, FILM COATED ORAL at 05:26

## 2017-10-08 RX ADMIN — Medication 100 MILLIGRAM(S): at 11:48

## 2017-10-08 RX ADMIN — Medication 50 MILLIGRAM(S): at 17:25

## 2017-10-08 RX ADMIN — ENOXAPARIN SODIUM 40 MILLIGRAM(S): 100 INJECTION SUBCUTANEOUS at 21:37

## 2017-10-08 NOTE — PROGRESS NOTE ADULT - ASSESSMENT
This is a 51 year old female with left sided weakness with right sided stroke.     continue presents medications     PT/rehab     cardiac work up
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
## 2017-10-08 NOTE — PROGRESS NOTE ADULT - SUBJECTIVE AND OBJECTIVE BOX
chief complaint : facial droop, weakness    SUBJECTIVE / OVERNIGHT EVENTS: pt denies chest pain, shortness of breath, nausea,  v    MEDICATIONS  (STANDING):  aspirin enteric coated 81 milliGRAM(s) Oral daily  atorvastatin 80 milliGRAM(s) Oral at bedtime  cyanocobalamin 500 MICROGram(s) Oral daily  dextrose 5%. 1000 milliLiter(s) (50 mL/Hr) IV Continuous <Continuous>  dextrose 50% Injectable 12.5 Gram(s) IV Push once  dextrose 50% Injectable 25 Gram(s) IV Push once  dextrose 50% Injectable 25 Gram(s) IV Push once  enoxaparin Injectable 40 milliGRAM(s) SubCutaneous every 24 hours  ergocalciferol 72527 Unit(s) Oral <User Schedule>  influenza   Vaccine 0.5 milliLiter(s) IntraMuscular once  insulin glargine Injectable (LANTUS) 16 Unit(s) SubCutaneous at bedtime  insulin lispro (HumaLOG) corrective regimen sliding scale   SubCutaneous three times a day before meals  insulin lispro (HumaLOG) corrective regimen sliding scale   SubCutaneous at bedtime  losartan 50 milliGRAM(s) Oral daily  metoprolol 50 milliGRAM(s) Oral two times a day  NIFEdipine XL 30 milliGRAM(s) Oral daily  pyridoxine 100 milliGRAM(s) Oral daily  sodium chloride 0.9%. 1000 milliLiter(s) (75 mL/Hr) IV Continuous <Continuous>    MEDICATIONS  (PRN):  acetaminophen   Tablet. 650 milliGRAM(s) Oral every 6 hours PRN Mild and moderate pain  dextrose Gel 1 Dose(s) Oral once PRN Blood Glucose LESS THAN 70 milliGRAM(s)/deciliter  glucagon  Injectable 1 milliGRAM(s) IntraMuscular once PRN Glucose LESS THAN 70 milligrams/deciliter    Vital Signs Last 24 Hrs  T(C): 36.7 (08 Oct 2017 21:36), Max: 36.9 (08 Oct 2017 05:24)  T(F): 98 (08 Oct 2017 21:36), Max: 98.5 (08 Oct 2017 05:24)  HR: 70 (08 Oct 2017 21:36) (70 - 89)  BP: 155/80 (08 Oct 2017 21:36) (148/73 - 156/101)  BP(mean): --  RR: 18 (08 Oct 2017 21:36) (18 - 18)  SpO2: 100% (08 Oct 2017 21:36) (98% - 100%)  Constitutional: No fever, fatigue  Skin: No rash.  Eyes: No recent vision problems or eye pain.  ENT: No congestion, ear pain, or sore throat.  Cardiovascular: No chest pain or palpation.  Respiratory: No cough, shortness of breath, congestion, or wheezing.  Gastrointestinal: No abdominal pain, nausea, vomiting, or diarrhea.  Genitourinary: No dysuria.  Musculoskeletal: No joint swelling.  Neurologic: No headache.    PHYSICAL EXAM:  GENERAL: NAD  EYES: EOMI, PERRLA  NECK: Supple, No JVD  CHEST/LUNG: dec breath sounds at bases  HEART:  S1 , S2 +  ABDOMEN: soft, bs+  EXTREMITIES:  no edema  NEUROLOGY: alert awake oriented , no neck stiffness    LABS:  10-08    138  |  100  |  13  ----------------------------<  198<H>  3.6   |  24  |  0.70    Ca    8.9      08 Oct 2017 07:17  Mg     1.6     10-08      Creatinine Trend: 0.70 <--, 0.65 <--, 0.70 <--, 0.58 <--, 0.57 <--, 0.68 <--                        12.3   8.06  )-----------( 309      ( 08 Oct 2017 07:17 )             40.7     Urine Studies:

## 2017-10-08 NOTE — PROGRESS NOTE ADULT - SUBJECTIVE AND OBJECTIVE BOX
INTERVAL HISTORY: pt is lying in bed comfortable, not in distress  	  MEDICATIONS:  aspirin enteric coated 81 milliGRAM(s) Oral daily  enoxaparin Injectable 40 milliGRAM(s) SubCutaneous every 24 hours  losartan 50 milliGRAM(s) Oral daily  metoprolol 25 milliGRAM(s) Oral two times a day  NIFEdipine XL 30 milliGRAM(s) Oral daily        acetaminophen   Tablet. 650 milliGRAM(s) Oral every 6 hours PRN      atorvastatin 80 milliGRAM(s) Oral at bedtime  dextrose 50% Injectable 12.5 Gram(s) IV Push once  dextrose 50% Injectable 25 Gram(s) IV Push once  dextrose 50% Injectable 25 Gram(s) IV Push once  dextrose Gel 1 Dose(s) Oral once PRN  glucagon  Injectable 1 milliGRAM(s) IntraMuscular once PRN  insulin glargine Injectable (LANTUS) 16 Unit(s) SubCutaneous at bedtime  insulin lispro (HumaLOG) corrective regimen sliding scale   SubCutaneous three times a day before meals  insulin lispro (HumaLOG) corrective regimen sliding scale   SubCutaneous at bedtime    cyanocobalamin 500 MICROGram(s) Oral daily  dextrose 5%. 1000 milliLiter(s) IV Continuous <Continuous>  ergocalciferol 88782 Unit(s) Oral <User Schedule>  influenza   Vaccine 0.5 milliLiter(s) IntraMuscular once  pyridoxine 100 milliGRAM(s) Oral daily  sodium chloride 0.9%. 1000 milliLiter(s) IV Continuous <Continuous>      PHYSICAL EXAM:  T(C): 36.9 (10-08-17 @ 05:24), Max: 37.2 (10-07-17 @ 15:13)  HR: 81 (10-08-17 @ 05:24) (78 - 100)  BP: 148/73 (10-08-17 @ 05:24) (148/73 - 186/110)  RR: 18 (10-08-17 @ 05:24) (18 - 19)  SpO2: 100% (10-08-17 @ 05:24) (99% - 100%)  Wt(kg): --  I&O's Summary        Appearance: Normal	  HEENT:   Normal oral mucosa, PERRL, EOMI	  Cardiovascular: Normal S1 S2, No JVD, No murmurs, No edema  Respiratory: Lungs clear to auscultation	  Gastrointestinal:  Soft, Non-tender, + BS	  Extremities: Normal range of motion, No clubbing, cyanosis or edema                                    12.3   8.06  )-----------( 309      ( 08 Oct 2017 07:17 )             40.7     10-08    138  |  100  |  13  ----------------------------<  198<H>  3.6   |  24  |  0.70    Ca    8.9      08 Oct 2017 07:17  Mg     1.6     10-08      proBNP:   Lipid Profile:   HgA1c:   TSH:

## 2017-10-08 NOTE — PROGRESS NOTE ADULT - ASSESSMENT
EKG - normal sinus rhythm   Nuclear stress test 2015- normal LV no ischemia    a/p     1) Acute right basal ganglia stroke - no carotid stenosis, increase lopressor to 50mg q12 , Echo shows normal LV function no signnificant arrythmias on tele, will monite cont asa lipitor, neurology follow up     2) Chest pain - atypical, EKG and cardiac enzymes negative, Echo shows normal LV    3) DVT prophylasix - on lovenox

## 2017-10-09 LAB
ALDOST SERPL-MCNC: 13.1 NG/DL — SIGNIFICANT CHANGE UP
BUN SERPL-MCNC: 14 MG/DL — SIGNIFICANT CHANGE UP (ref 7–23)
BUN SERPL-MCNC: 14 MG/DL — SIGNIFICANT CHANGE UP (ref 7–23)
CALCIUM SERPL-MCNC: 8.7 MG/DL — SIGNIFICANT CHANGE UP (ref 8.4–10.5)
CALCIUM SERPL-MCNC: 8.7 MG/DL — SIGNIFICANT CHANGE UP (ref 8.4–10.5)
CHLORIDE SERPL-SCNC: 101 MMOL/L — SIGNIFICANT CHANGE UP (ref 98–107)
CHLORIDE SERPL-SCNC: 101 MMOL/L — SIGNIFICANT CHANGE UP (ref 98–107)
CO2 SERPL-SCNC: 24 MMOL/L — SIGNIFICANT CHANGE UP (ref 22–31)
CO2 SERPL-SCNC: 24 MMOL/L — SIGNIFICANT CHANGE UP (ref 22–31)
CREAT SERPL-MCNC: 0.7 MG/DL — SIGNIFICANT CHANGE UP (ref 0.5–1.3)
CREAT SERPL-MCNC: 0.7 MG/DL — SIGNIFICANT CHANGE UP (ref 0.5–1.3)
GLUCOSE SERPL-MCNC: 183 MG/DL — HIGH (ref 70–99)
GLUCOSE SERPL-MCNC: 183 MG/DL — HIGH (ref 70–99)
MAGNESIUM SERPL-MCNC: 1.6 MG/DL — SIGNIFICANT CHANGE UP (ref 1.6–2.6)
POTASSIUM SERPL-MCNC: 3.9 MMOL/L — SIGNIFICANT CHANGE UP (ref 3.5–5.3)
POTASSIUM SERPL-MCNC: 3.9 MMOL/L — SIGNIFICANT CHANGE UP (ref 3.5–5.3)
POTASSIUM SERPL-SCNC: 3.9 MMOL/L — SIGNIFICANT CHANGE UP (ref 3.5–5.3)
POTASSIUM SERPL-SCNC: 3.9 MMOL/L — SIGNIFICANT CHANGE UP (ref 3.5–5.3)
SODIUM SERPL-SCNC: 137 MMOL/L — SIGNIFICANT CHANGE UP (ref 135–145)
SODIUM SERPL-SCNC: 137 MMOL/L — SIGNIFICANT CHANGE UP (ref 135–145)

## 2017-10-09 PROCEDURE — 74185 MRA ABD W OR W/O CNTRST: CPT | Mod: 26

## 2017-10-09 PROCEDURE — 99233 SBSQ HOSP IP/OBS HIGH 50: CPT

## 2017-10-09 RX ORDER — SODIUM CHLORIDE 9 MG/ML
1000 INJECTION INTRAMUSCULAR; INTRAVENOUS; SUBCUTANEOUS
Qty: 0 | Refills: 0 | Status: CANCELLED | OUTPATIENT
Start: 2018-09-05 | End: 2017-10-10

## 2017-10-09 RX ORDER — SODIUM CHLORIDE 9 MG/ML
1000 INJECTION INTRAMUSCULAR; INTRAVENOUS; SUBCUTANEOUS
Qty: 0 | Refills: 0 | Status: DISCONTINUED | OUTPATIENT
Start: 2017-10-09 | End: 2017-10-10

## 2017-10-09 RX ORDER — MAGNESIUM SULFATE 500 MG/ML
1 VIAL (ML) INJECTION ONCE
Qty: 0 | Refills: 0 | Status: COMPLETED | OUTPATIENT
Start: 2017-10-09 | End: 2017-10-09

## 2017-10-09 RX ADMIN — Medication: at 10:12

## 2017-10-09 RX ADMIN — ERGOCALCIFEROL 50000 UNIT(S): 1.25 CAPSULE ORAL at 05:25

## 2017-10-09 RX ADMIN — INSULIN GLARGINE 16 UNIT(S): 100 INJECTION, SOLUTION SUBCUTANEOUS at 22:24

## 2017-10-09 RX ADMIN — Medication 1: at 13:43

## 2017-10-09 RX ADMIN — Medication 1: at 17:02

## 2017-10-09 RX ADMIN — Medication 30 MILLIGRAM(S): at 05:25

## 2017-10-09 RX ADMIN — SODIUM CHLORIDE 75 MILLILITER(S): 9 INJECTION INTRAMUSCULAR; INTRAVENOUS; SUBCUTANEOUS at 10:52

## 2017-10-09 RX ADMIN — Medication 50 MILLIGRAM(S): at 17:02

## 2017-10-09 RX ADMIN — ENOXAPARIN SODIUM 40 MILLIGRAM(S): 100 INJECTION SUBCUTANEOUS at 22:24

## 2017-10-09 RX ADMIN — ATORVASTATIN CALCIUM 80 MILLIGRAM(S): 80 TABLET, FILM COATED ORAL at 22:24

## 2017-10-09 RX ADMIN — Medication 100 GRAM(S): at 13:43

## 2017-10-09 RX ADMIN — Medication 81 MILLIGRAM(S): at 13:43

## 2017-10-09 RX ADMIN — LOSARTAN POTASSIUM 50 MILLIGRAM(S): 100 TABLET, FILM COATED ORAL at 05:25

## 2017-10-09 RX ADMIN — Medication 50 MILLIGRAM(S): at 05:25

## 2017-10-09 RX ADMIN — PREGABALIN 500 MICROGRAM(S): 225 CAPSULE ORAL at 13:43

## 2017-10-09 RX ADMIN — Medication 100 MILLIGRAM(S): at 13:43

## 2017-10-09 NOTE — PROGRESS NOTE ADULT - SUBJECTIVE AND OBJECTIVE BOX
INTERVAL HISTORY: pt seen in am lying in bed comfortable, not in distress  	  MEDICATIONS:  aspirin enteric coated 81 milliGRAM(s) Oral daily  enoxaparin Injectable 40 milliGRAM(s) SubCutaneous every 24 hours  losartan 50 milliGRAM(s) Oral daily  metoprolol 50 milliGRAM(s) Oral two times a day  NIFEdipine XL 30 milliGRAM(s) Oral daily  acetaminophen   Tablet. 650 milliGRAM(s) Oral every 6 hours PRN  atorvastatin 80 milliGRAM(s) Oral at bedtime  dextrose 50% Injectable 12.5 Gram(s) IV Push once  dextrose 50% Injectable 25 Gram(s) IV Push once  dextrose 50% Injectable 25 Gram(s) IV Push once  dextrose Gel 1 Dose(s) Oral once PRN  glucagon  Injectable 1 milliGRAM(s) IntraMuscular once PRN  insulin glargine Injectable (LANTUS) 16 Unit(s) SubCutaneous at bedtime  insulin lispro (HumaLOG) corrective regimen sliding scale   SubCutaneous three times a day before meals  insulin lispro (HumaLOG) corrective regimen sliding scale   SubCutaneous at bedtime  cyanocobalamin 500 MICROGram(s) Oral daily  dextrose 5%. 1000 milliLiter(s) IV Continuous <Continuous>  ergocalciferol 19982 Unit(s) Oral <User Schedule>  influenza   Vaccine 0.5 milliLiter(s) IntraMuscular once  magnesium sulfate  IVPB 1 Gram(s) IV Intermittent once  pyridoxine 100 milliGRAM(s) Oral daily  sodium chloride 0.9%. 1000 milliLiter(s) IV Continuous <Continuous>  sodium chloride 0.9%. 1000 milliLiter(s) IV Continuous <Continuous>      PHYSICAL EXAM:  T(C): 36.7 (10-09-17 @ 05:23), Max: 36.7 (10-08-17 @ 21:36)  HR: 69 (10-09-17 @ 05:23) (69 - 89)  BP: 138/66 (10-09-17 @ 05:23) (138/66 - 156/101)  RR: 18 (10-09-17 @ 05:23) (18 - 18)  SpO2: 100% (10-09-17 @ 05:23) (98% - 100%)  Wt(kg): --  I&O's Summary        Appearance: Normal	  HEENT:   Normal oral mucosa, PERRL, EOMI	  Cardiovascular: Normal S1 S2, No JVD, No murmurs, No edema  Respiratory: Lungs clear to auscultation	  Gastrointestinal:  Soft, Non-tender, + BS	  Extremities: no edema                                    12.3   8.06  )-----------( 309      ( 08 Oct 2017 07:17 )             40.7     10-09    137  |  101  |  14  ----------------------------<  183<H>  3.9   |  24  |  0.70    Ca    8.7      09 Oct 2017 05:57  Mg     1.6     10-09      proBNP:   Lipid Profile:   HgA1c:   TSH:

## 2017-10-09 NOTE — PROGRESS NOTE ADULT - ASSESSMENT
EKG - normal sinus rhythm   Nuclear stress test 2015- normal LV no ischemia    a/p     1) Acute right basal ganglia stroke - no carotid stenosis,  lopressor  50mg q12 , Echo shows normal LV function no signnificant arrythmias on tele, will monite cont asa lipitor, neurology follow up     2) Chest pain - atypical, EKG and cardiac enzymes negative, Echo shows normal LV    3) HTN - MRA renal artery pending

## 2017-10-09 NOTE — PROGRESS NOTE ADULT - SUBJECTIVE AND OBJECTIVE BOX
INTERVAL HISTORY: pt seen in am lying in bed comfortable, not in distress  	  MEDICATIONS:  aspirin enteric coated 81 milliGRAM(s) Oral daily  enoxaparin Injectable 40 milliGRAM(s) SubCutaneous every 24 hours  losartan 50 milliGRAM(s) Oral daily  metoprolol 50 milliGRAM(s) Oral two times a day  NIFEdipine XL 30 milliGRAM(s) Oral daily  acetaminophen   Tablet. 650 milliGRAM(s) Oral every 6 hours PRN  atorvastatin 80 milliGRAM(s) Oral at bedtime  dextrose 50% Injectable 12.5 Gram(s) IV Push once  dextrose 50% Injectable 25 Gram(s) IV Push once  dextrose 50% Injectable 25 Gram(s) IV Push once  dextrose Gel 1 Dose(s) Oral once PRN  glucagon  Injectable 1 milliGRAM(s) IntraMuscular once PRN  insulin glargine Injectable (LANTUS) 16 Unit(s) SubCutaneous at bedtime  insulin lispro (HumaLOG) corrective regimen sliding scale   SubCutaneous three times a day before meals  insulin lispro (HumaLOG) corrective regimen sliding scale   SubCutaneous at bedtime  cyanocobalamin 500 MICROGram(s) Oral daily  dextrose 5%. 1000 milliLiter(s) IV Continuous <Continuous>  ergocalciferol 28065 Unit(s) Oral <User Schedule>  influenza   Vaccine 0.5 milliLiter(s) IntraMuscular once  magnesium sulfate  IVPB 1 Gram(s) IV Intermittent once  pyridoxine 100 milliGRAM(s) Oral daily  sodium chloride 0.9%. 1000 milliLiter(s) IV Continuous <Continuous>  sodium chloride 0.9%. 1000 milliLiter(s) IV Continuous <Continuous>      PHYSICAL EXAM:  T(C): 36.7 (10-09-17 @ 05:23), Max: 36.7 (10-08-17 @ 21:36)  HR: 69 (10-09-17 @ 05:23) (69 - 89)  BP: 138/66 (10-09-17 @ 05:23) (138/66 - 156/101)  RR: 18 (10-09-17 @ 05:23) (18 - 18)  SpO2: 100% (10-09-17 @ 05:23) (98% - 100%)  Wt(kg): --  I&O's Summary        Appearance: Normal	  HEENT:   Normal oral mucosa, PERRL, EOMI	  Cardiovascular: Normal S1 S2, No JVD, No murmurs, No edema  Respiratory: Lungs clear to auscultation	  Gastrointestinal:  Soft, Non-tender, + BS	  Extremities: no edema                                    12.3   8.06  )-----------( 309      ( 08 Oct 2017 07:17 )             40.7     10-09    137  |  101  |  14  ----------------------------<  183<H>  3.9   |  24  |  0.70    Ca    8.7      09 Oct 2017 05:57  Mg     1.6     10-09      proBNP:   Lipid Profile:   HgA1c:   TSH:

## 2017-10-09 NOTE — PROGRESS NOTE ADULT - ASSESSMENT
51-year-old right-handed  lady first evaluated at [facility] on 10/4/17 with left hemiparesis. In the past, she has had episodes of left-sided numbness. On 10/3/17, while driving, her sister noted a left facial palsy, while the patient also noted left facial numbness.    Impression: in the past, she has had episodes of left facial numbness. On 10/3/17 she developed very mild left hemiparesis, due to multiple scattered small right hemispheric infarcts, most likely in the MCA distribution. Etiology of her infarcts is unknown, and is consistent with embolic stroke of unknown source. Cardioembolism should be screened for. Suggest. As inpatient or outpatient: CHRIS; implantable cardiac loop recorder (Dr. Irvin Rivera is her cardiologist); aspirin; neurologically cleared for discharge as she can have her cardiac workup as either inpatient or outpatient.    Plan:  - c/w ASA 81mg and Lipitor 80mg  - CHRIS and loop recorder can be done as an outpatient with Dr Rivera

## 2017-10-09 NOTE — PROGRESS NOTE ADULT - PROBLEM SELECTOR PLAN 1
Patient is having uncontrolled HTN. She claims it to be for only 2 yrs. Admitted with CVA. BP was planned to be kept high for 24 hrs as permissive HTN after CVA. She was on clonidine at home but states she is feeling weak and tired while on it.   Her HTN is likely Primary but need to be R/O Renal artery stenosis. renal artery not well visualized, pending MRA. Please hydrate with NS 75cc/hr 6 hrs before and after.   no hypokalemia or alkalosis to suggest Prim Hyperaldosteronism. No h/o attacks of anxiety/diaphoresis/palpitation  BP controlled on Losartan 50mg QD, nifedipine 30 mg qd, metoprolol 50 bid Patient is having uncontrolled HTN. She claims it to be for only 2 yrs. Admitted with CVA. BP was planned to be kept high for 24 hrs as permissive HTN after CVA. She was on clonidine at home but states she is feeling weak and tired while on it.   Her HTN is likely Primary but need to be R/O Renal artery stenosis. renal artery not well visualized, pending MRA.   no hypokalemia or alkalosis to suggest Prim Hyperaldosteronism. No h/o attacks of anxiety/diaphoresis/palpitation  BP controlled on Losartan 50mg QD, nifedipine 30 mg qd, metoprolol 50 bid

## 2017-10-09 NOTE — PROGRESS NOTE ADULT - SUBJECTIVE AND OBJECTIVE BOX
51-year-old right-handed  lady first evaluated at [facility] on 10/4/17 with left hemiparesis. In the past, she has had episodes of left-sided numbness. On 10/3/17, while driving, her sister noted a left facial palsy, while the patient also noted left facial numbness. Medications at home included aspirin. ROS otherwise negative.    S: Patient was seen and examined at bedside.  No acute issues.    O:  Vital Signs Last 24 Hrs  T(C): 36.7 (09 Oct 2017 05:23), Max: 36.7 (08 Oct 2017 21:36)  T(F): 98 (09 Oct 2017 05:23), Max: 98 (08 Oct 2017 21:36)  HR: 69 (09 Oct 2017 05:23) (69 - 89)  BP: 138/66 (09 Oct 2017 05:23) (138/66 - 156/101)  BP(mean): --  RR: 18 (09 Oct 2017 05:23) (18 - 18)  SpO2: 100% (09 Oct 2017 05:23) (98% - 100%)    Neurological exam. Alert and attentive; mild left facial palsy; no dysarthrias; drift of left leg but not left arm; remainder of neurologic exam was nonfocal.    10-09    137  |  101  |  14  ----------------------------<  183<H>  3.9   |  24  |  0.70    Ca    8.7      09 Oct 2017 05:57  Mg     1.6     10-09    MRI:  Acute infarcts involving the head of the right caudate nucleus, right   lentiform nucleus and scattered areas of the right frontal and parietal   regions. No evidence of hemorrhagic transformation. 51-year-old right-handed  lady first evaluated at [facility] on 10/4/17 with left hemiparesis. In the past, she has had episodes of left-sided numbness. On 10/3/17, while driving, her sister noted a left facial palsy, while the patient also noted left facial numbness. Medications at home included aspirin. ROS otherwise negative.    S: Patient was seen and examined at bedside.  No acute issues.    O:  Vital Signs Last 24 Hrs  T(C): 36.7 (09 Oct 2017 05:23), Max: 36.7 (08 Oct 2017 21:36)  T(F): 98 (09 Oct 2017 05:23), Max: 98 (08 Oct 2017 21:36)  HR: 69 (09 Oct 2017 05:23) (69 - 89)  BP: 138/66 (09 Oct 2017 05:23) (138/66 - 156/101)  BP(mean): --  RR: 18 (09 Oct 2017 05:23) (18 - 18)  SpO2: 100% (09 Oct 2017 05:23) (98% - 100%)    Neurological exam. Alert and attentive; mild left facial palsy; no dysarthrias; drift of left leg but not left arm; remainder of neurologic exam was nonfocal.    10-09    137  |  101  |  14  ----------------------------<  183<H>  3.9   |  24  |  0.70    Ca    8.7      09 Oct 2017 05:57  Mg     1.6     10-09    MRI:  Acute infarcts involving the head of the right caudate nucleus, right   lentiform nucleus and scattered areas of the right frontal and parietal   regions. No evidence of hemorrhagic transformation.    CTA neck and head (10/3/17) was unremarkable.

## 2017-10-09 NOTE — PROGRESS NOTE ADULT - SUBJECTIVE AND OBJECTIVE BOX
Dr. Jj (Nephrology)  Office [phone]  Cell [phone]  Inge RODRIGUEZ  Cell [phone]      Patient is a 51y old  Female who presents with a chief complaint of Facial droop, weakness (04 Oct 2017 08:30)      Patient seen and examined at bedside. No chest pain/sob    VITALS:  T(F): 98 (10-09-17 @ 05:23), Max: 98 (10-08-17 @ 21:36)  HR: 69 (10-09-17 @ 05:23)  BP: 138/66 (10-09-17 @ 05:23)  RR: 18 (10-09-17 @ 05:23)  SpO2: 100% (10-09-17 @ 05:23)  Wt(kg): --        PHYSICAL EXAM:  Constitutional: NAD  Neck: No JVD  Respiratory: CTAB, no wheezes, rales or rhonchi  Cardiovascular: S1, S2, RRR  Gastrointestinal: BS+, soft, NT/ND  Extremities: No peripheral edema    Hospital Medications:   MEDICATIONS  (STANDING):  aspirin enteric coated 81 milliGRAM(s) Oral daily  atorvastatin 80 milliGRAM(s) Oral at bedtime  cyanocobalamin 500 MICROGram(s) Oral daily  dextrose 5%. 1000 milliLiter(s) (50 mL/Hr) IV Continuous <Continuous>  dextrose 50% Injectable 12.5 Gram(s) IV Push once  dextrose 50% Injectable 25 Gram(s) IV Push once  dextrose 50% Injectable 25 Gram(s) IV Push once  enoxaparin Injectable 40 milliGRAM(s) SubCutaneous every 24 hours  ergocalciferol 78112 Unit(s) Oral <User Schedule>  influenza   Vaccine 0.5 milliLiter(s) IntraMuscular once  insulin glargine Injectable (LANTUS) 16 Unit(s) SubCutaneous at bedtime  insulin lispro (HumaLOG) corrective regimen sliding scale   SubCutaneous three times a day before meals  insulin lispro (HumaLOG) corrective regimen sliding scale   SubCutaneous at bedtime  losartan 50 milliGRAM(s) Oral daily  metoprolol 50 milliGRAM(s) Oral two times a day  NIFEdipine XL 30 milliGRAM(s) Oral daily  pyridoxine 100 milliGRAM(s) Oral daily  sodium chloride 0.9%. 1000 milliLiter(s) (75 mL/Hr) IV Continuous <Continuous>  sodium chloride 0.9%. 1000 milliLiter(s) (75 mL/Hr) IV Continuous <Continuous>      LABS:  10-09    137  |  101  |  14  ----------------------------<  183<H>  3.9   |  24  |  0.70    Ca    8.7      09 Oct 2017 05:57  Mg     1.6     10-09      Creatinine Trend: 0.70 <--, 0.70 <--, 0.65 <--, 0.70 <--, 0.58 <--, 0.57 <--, 0.68 <--                                12.3   8.06  )-----------( 309      ( 08 Oct 2017 07:17 )             40.7     Urine Studies:      HbA1c 9.5      [10-04-17 @ 07:10]  TSH 1.13      [10-04-17 @ 07:10]  Lipid: chol 162, TG 68, HDL 49,       [10-03-17 @ 09:24]      SALTY: titer Negative, pattern --      [10-04-17 @ 16:34]  Rheumatoid Factor < 7.0      [10-04-17 @ 16:34]    RADIOLOGY & ADDITIONAL STUDIES:

## 2017-10-10 VITALS
SYSTOLIC BLOOD PRESSURE: 156 MMHG | RESPIRATION RATE: 17 BRPM | DIASTOLIC BLOOD PRESSURE: 83 MMHG | OXYGEN SATURATION: 100 % | TEMPERATURE: 98 F | HEART RATE: 79 BPM

## 2017-10-10 LAB
GLUCOSE BLDC GLUCOMTR-MCNC: 160 MG/DL — HIGH (ref 70–99)
GLUCOSE BLDC GLUCOMTR-MCNC: 176 MG/DL — HIGH (ref 70–99)

## 2017-10-10 PROCEDURE — 99233 SBSQ HOSP IP/OBS HIGH 50: CPT

## 2017-10-10 RX ORDER — METOPROLOL TARTRATE 50 MG
1 TABLET ORAL
Qty: 60 | Refills: 0
Start: 2017-10-10 | End: 2017-11-09

## 2017-10-10 RX ORDER — LOSARTAN POTASSIUM 100 MG/1
1 TABLET, FILM COATED ORAL
Qty: 30 | Refills: 0 | OUTPATIENT
Start: 2017-10-10 | End: 2017-11-09

## 2017-10-10 RX ORDER — NIFEDIPINE 30 MG
60 TABLET, EXTENDED RELEASE 24 HR ORAL DAILY
Qty: 0 | Refills: 0 | Status: DISCONTINUED | OUTPATIENT
Start: 2017-10-10 | End: 2017-10-10

## 2017-10-10 RX ORDER — NIFEDIPINE 30 MG
1 TABLET, EXTENDED RELEASE 24 HR ORAL
Qty: 30 | Refills: 0
Start: 2017-10-10 | End: 2017-11-09

## 2017-10-10 RX ORDER — ASPIRIN/CALCIUM CARB/MAGNESIUM 324 MG
1 TABLET ORAL
Qty: 0 | Refills: 0 | COMMUNITY
Start: 2017-10-10

## 2017-10-10 RX ORDER — ATORVASTATIN CALCIUM 80 MG/1
1 TABLET, FILM COATED ORAL
Qty: 30 | Refills: 0
Start: 2017-10-10 | End: 2017-11-09

## 2017-10-10 RX ORDER — LOSARTAN POTASSIUM 100 MG/1
1 TABLET, FILM COATED ORAL
Qty: 0 | Refills: 0 | COMMUNITY
Start: 2017-10-10

## 2017-10-10 RX ADMIN — Medication 81 MILLIGRAM(S): at 14:06

## 2017-10-10 RX ADMIN — Medication 1: at 10:03

## 2017-10-10 RX ADMIN — INFLUENZA VIRUS VACCINE 0.5 MILLILITER(S): 15; 15; 15; 15 SUSPENSION INTRAMUSCULAR at 16:32

## 2017-10-10 RX ADMIN — PREGABALIN 500 MICROGRAM(S): 225 CAPSULE ORAL at 14:06

## 2017-10-10 RX ADMIN — Medication 50 MILLIGRAM(S): at 05:49

## 2017-10-10 RX ADMIN — Medication 30 MILLIGRAM(S): at 05:49

## 2017-10-10 RX ADMIN — Medication: at 14:06

## 2017-10-10 RX ADMIN — Medication 100 MILLIGRAM(S): at 14:19

## 2017-10-10 RX ADMIN — LOSARTAN POTASSIUM 50 MILLIGRAM(S): 100 TABLET, FILM COATED ORAL at 05:49

## 2017-10-10 NOTE — PROGRESS NOTE ADULT - PROBLEM SELECTOR PLAN 1
BP fluctuating. nifedipine increased to 60mg today. monitor bp. MRA neg for RADHA. pending aldosterone and renin level. check metanephrine level in view of fluctuating BP.    continue losartan and metoprolol

## 2017-10-10 NOTE — PROGRESS NOTE ADULT - PROVIDER SPECIALTY LIST ADULT
Cardiology
Internal Medicine
Nephrology
Neurology
Cardiology

## 2017-10-10 NOTE — PROGRESS NOTE ADULT - SUBJECTIVE AND OBJECTIVE BOX
Dr. Jj (Nephrology)  Office [phone]  Cell [phone]  Inge RODRIGUEZ  Cell [phone]      Patient is a 51y old  Female who presents with a chief complaint of Facial droop, weakness (04 Oct 2017 08:30)      Patient seen and examined at bedside. No chest pain/sob    VITALS:  T(F): 98.1 (10-10-17 @ 05:45), Max: 98.3 (10-09-17 @ 15:31)  HR: 69 (10-10-17 @ 05:45)  BP: 156/76 (10-10-17 @ 05:45)  RR: 18 (10-10-17 @ 05:45)  SpO2: 99% (10-10-17 @ 05:45)  Wt(kg): --        PHYSICAL EXAM:  Constitutional: NAD  Neck: No JVD  Respiratory: CTAB, no wheezes, rales or rhonchi  Cardiovascular: S1, S2, RRR  Gastrointestinal: BS+, soft, NT/ND  Extremities: 1+ peripheral edema nonpitting    Hospital Medications:   MEDICATIONS  (STANDING):  aspirin enteric coated 81 milliGRAM(s) Oral daily  atorvastatin 80 milliGRAM(s) Oral at bedtime  cyanocobalamin 500 MICROGram(s) Oral daily  dextrose 5%. 1000 milliLiter(s) (50 mL/Hr) IV Continuous <Continuous>  dextrose 50% Injectable 12.5 Gram(s) IV Push once  dextrose 50% Injectable 25 Gram(s) IV Push once  dextrose 50% Injectable 25 Gram(s) IV Push once  enoxaparin Injectable 40 milliGRAM(s) SubCutaneous every 24 hours  ergocalciferol 71623 Unit(s) Oral <User Schedule>  influenza   Vaccine 0.5 milliLiter(s) IntraMuscular once  insulin glargine Injectable (LANTUS) 16 Unit(s) SubCutaneous at bedtime  insulin lispro (HumaLOG) corrective regimen sliding scale   SubCutaneous three times a day before meals  insulin lispro (HumaLOG) corrective regimen sliding scale   SubCutaneous at bedtime  losartan 50 milliGRAM(s) Oral daily  metoprolol 50 milliGRAM(s) Oral two times a day  NIFEdipine XL 60 milliGRAM(s) Oral daily  pyridoxine 100 milliGRAM(s) Oral daily  sodium chloride 0.9%. 1000 milliLiter(s) (75 mL/Hr) IV Continuous <Continuous>  sodium chloride 0.9%. 1000 milliLiter(s) (75 mL/Hr) IV Continuous <Continuous>      LABS:  10-09    137  |  101  |  14  ----------------------------<  183<H>  3.9   |  24  |  0.70    Ca    8.7      09 Oct 2017 05:57  Mg     1.6     10-09      Creatinine Trend: 0.70 <--, 0.70 <--, 0.65 <--, 0.70 <--, 0.58 <--, 0.57 <--            Urine Studies:      HbA1c 9.5      [10-04-17 @ 07:10]  TSH 1.13      [10-04-17 @ 07:10]  Lipid: chol 162, TG 68, HDL 49,       [10-03-17 @ 09:24]      SALTY: titer Negative, pattern --      [10-04-17 @ 16:34]  Rheumatoid Factor < 7.0      [10-04-17 @ 16:34]    RADIOLOGY & ADDITIONAL STUDIES:

## 2017-10-10 NOTE — PROGRESS NOTE ADULT - SUBJECTIVE AND OBJECTIVE BOX
INTERVAL HISTORY: pt is lying in bed comfortable, not in distress, no chest pain noSOB  	  MEDICATIONS:  aspirin enteric coated 81 milliGRAM(s) Oral daily  enoxaparin Injectable 40 milliGRAM(s) SubCutaneous every 24 hours  losartan 50 milliGRAM(s) Oral daily  metoprolol 50 milliGRAM(s) Oral two times a day  NIFEdipine XL 60 milliGRAM(s) Oral daily        acetaminophen   Tablet. 650 milliGRAM(s) Oral every 6 hours PRN      atorvastatin 80 milliGRAM(s) Oral at bedtime  dextrose 50% Injectable 12.5 Gram(s) IV Push once  dextrose 50% Injectable 25 Gram(s) IV Push once  dextrose 50% Injectable 25 Gram(s) IV Push once  dextrose Gel 1 Dose(s) Oral once PRN  glucagon  Injectable 1 milliGRAM(s) IntraMuscular once PRN  insulin glargine Injectable (LANTUS) 16 Unit(s) SubCutaneous at bedtime  insulin lispro (HumaLOG) corrective regimen sliding scale   SubCutaneous three times a day before meals  insulin lispro (HumaLOG) corrective regimen sliding scale   SubCutaneous at bedtime    cyanocobalamin 500 MICROGram(s) Oral daily  dextrose 5%. 1000 milliLiter(s) IV Continuous <Continuous>  ergocalciferol 16674 Unit(s) Oral <User Schedule>  influenza   Vaccine 0.5 milliLiter(s) IntraMuscular once  pyridoxine 100 milliGRAM(s) Oral daily  sodium chloride 0.9%. 1000 milliLiter(s) IV Continuous <Continuous>  sodium chloride 0.9%. 1000 milliLiter(s) IV Continuous <Continuous>      PHYSICAL EXAM:  T(C): 36.7 (10-10-17 @ 05:45), Max: 36.8 (10-09-17 @ 15:31)  HR: 69 (10-10-17 @ 05:45) (64 - 69)  BP: 156/76 (10-10-17 @ 05:45) (156/76 - 188/82)  RR: 18 (10-10-17 @ 05:45) (18 - 18)  SpO2: 99% (10-10-17 @ 05:45) (99% - 100%)  Wt(kg): --  I&O's Summary        Appearance: Normal	  HEENT:   Normal oral mucosa, PERRL, EOMI	  Cardiovascular: Normal S1 S2, No JVD, No murmurs, No edema  Respiratory: Lungs clear to auscultation	  Gastrointestinal:  Soft, Non-tender, + BS	  Extremities: Normal range of motion, No clubbing, cyanosis or edema                10-09    137  |  101  |  14  ----------------------------<  183<H>  3.9   |  24  |  0.70    Ca    8.7      09 Oct 2017 05:57  Mg     1.6     10-09      proBNP:   Lipid Profile:   HgA1c:   TSH:

## 2017-10-10 NOTE — PROGRESS NOTE ADULT - ASSESSMENT
EKG - normal sinus rhythm   Nuclear stress test 2015- normal LV no ischemia    a/p     1) Acute right basal ganglia stroke - no carotid stenosis,  lopressor  50mg q12 , Echo shows normal LV function no signnificant arrythmias on tele, will monite cont asa lipitor, neurology follow up     2) Chest pain - atypical, EKG and cardiac enzymes negative, Echo shows normal LV    3) HTN - MRA renal artery normal no renal artery stenosis, increase nifedipine to 60mg daily, cont lopressor and losartan

## 2017-10-10 NOTE — PROGRESS NOTE ADULT - SUBJECTIVE AND OBJECTIVE BOX
Neurology Progress Note    Subj: L facial numbness improving. Patient feels well with no complaints.      MEDICATIONS  (STANDING):  aspirin enteric coated 81 milliGRAM(s) Oral daily  atorvastatin 80 milliGRAM(s) Oral at bedtime  cyanocobalamin 500 MICROGram(s) Oral daily  dextrose 5%. 1000 milliLiter(s) (50 mL/Hr) IV Continuous <Continuous>  dextrose 50% Injectable 12.5 Gram(s) IV Push once  dextrose 50% Injectable 25 Gram(s) IV Push once  dextrose 50% Injectable 25 Gram(s) IV Push once  enoxaparin Injectable 40 milliGRAM(s) SubCutaneous every 24 hours  ergocalciferol 19289 Unit(s) Oral <User Schedule>  influenza   Vaccine 0.5 milliLiter(s) IntraMuscular once  insulin glargine Injectable (LANTUS) 16 Unit(s) SubCutaneous at bedtime  insulin lispro (HumaLOG) corrective regimen sliding scale   SubCutaneous three times a day before meals  insulin lispro (HumaLOG) corrective regimen sliding scale   SubCutaneous at bedtime  losartan 50 milliGRAM(s) Oral daily  metoprolol 50 milliGRAM(s) Oral two times a day  NIFEdipine XL 60 milliGRAM(s) Oral daily  pyridoxine 100 milliGRAM(s) Oral daily  sodium chloride 0.9%. 1000 milliLiter(s) (75 mL/Hr) IV Continuous <Continuous>  sodium chloride 0.9%. 1000 milliLiter(s) (75 mL/Hr) IV Continuous <Continuous>    MEDICATIONS  (PRN):  acetaminophen   Tablet. 650 milliGRAM(s) Oral every 6 hours PRN Mild and moderate pain  dextrose Gel 1 Dose(s) Oral once PRN Blood Glucose LESS THAN 70 milliGRAM(s)/deciliter  glucagon  Injectable 1 milliGRAM(s) IntraMuscular once PRN Glucose LESS THAN 70 milligrams/deciliter      Objective:   Vital Signs Last 24 Hrs  T(C): 36.7 (10 Oct 2017 05:45), Max: 36.8 (09 Oct 2017 15:31)  T(F): 98.1 (10 Oct 2017 05:45), Max: 98.3 (09 Oct 2017 15:31)  HR: 69 (10 Oct 2017 05:45) (64 - 69)  BP: 156/76 (10 Oct 2017 05:45) (156/76 - 188/82)  BP(mean): --  RR: 18 (10 Oct 2017 05:45) (18 - 18)  SpO2: 99% (10 Oct 2017 05:45) (99% - 100%)    General Adult Exam  GENERAL APPEARANCE: Well developed, well nourished, alert and cooperative, and appears to be in no acute distress.    Neurological Exam:  Mental Status: Orientated to self, date and place.  Able to follow commands across midline. Speech fluent.    Cranial Nerves: PERRL, EOMI, very subtle L facial droop. No dysarthria    Motor:   Tone: normal.                  Strength: No drift in b/l UEs. LLE + Subtle drift. 5/5 strength b/l LEs.     Tremor: No resting, postural or action tremor.  No myoclonus.    Sensation: intact to light touch in UEs b/l. No extinction on double simultaneous stimulation.    Coord: No ataxia on FTN b/l        Other:      10-09    137  |  101  |  14  ----------------------------<  183<H>  3.9   |  24  |  0.70    Ca    8.7      09 Oct 2017 05:57  Mg     1.6     10-09
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
## 2017-10-11 LAB — RENIN PLAS-CCNC: < 0.167 NG/ML/HR — LOW (ref 0.17–5.38)

## 2017-10-30 ENCOUNTER — OUTPATIENT (OUTPATIENT)
Dept: OUTPATIENT SERVICES | Facility: HOSPITAL | Age: 52
LOS: 1 days | Discharge: HOME | End: 2017-10-30
Payer: MEDICAID

## 2017-10-30 VITALS
TEMPERATURE: 98 F | WEIGHT: 244.05 LBS | DIASTOLIC BLOOD PRESSURE: 84 MMHG | RESPIRATION RATE: 16 BRPM | SYSTOLIC BLOOD PRESSURE: 185 MMHG | HEIGHT: 66 IN | OXYGEN SATURATION: 98 % | HEART RATE: 68 BPM

## 2017-10-30 DIAGNOSIS — Z98.89 OTHER SPECIFIED POSTPROCEDURAL STATES: Chronic | ICD-10-CM

## 2017-10-30 DIAGNOSIS — R55 SYNCOPE AND COLLAPSE: ICD-10-CM

## 2017-10-30 PROCEDURE — 33282: CPT

## 2017-10-30 PROCEDURE — 93010 ELECTROCARDIOGRAM REPORT: CPT

## 2017-10-30 PROCEDURE — C1764: CPT

## 2017-10-30 PROCEDURE — 93005 ELECTROCARDIOGRAM TRACING: CPT

## 2017-10-30 NOTE — H&P CARDIOLOGY - HISTORY OF PRESENT ILLNESS
52 y/o obese female (BMI 39) with a pmh of HTN, HLD, DM2 (AIC 9.5 better controlled with no complications), sarcoidosis and chronic back pain secondary to herniated discs in the cervical and lumbar spine, CVA CTA of head and neck revealed early Rt basal ganglia infarct 10/3/17 recent hospitalization for left sided facial droop, slurred speech and gait disturbance not a candidate for TPA 2/2 outside window with full resolve of symptoms.  Pt followed up with Dr. Libman, Neurologist and Dr. Rivera, Cardiology and reports feeling her heart "racing" with no associated lightheadedness with a sharp pain in the middle of the chest.  Pt presents for implantable loop recorder for further investigation of possible arrhythmia that might have lead up to stroke-like symptoms.  Pt denies cp, sob or palpitations presently.      < from: TTE with Doppler (w/Cont) (10.06.17 @ 15:36) >    Patient name: TIESHA KIM  YOB: 1965   Age: 51 (F)   MR#: [id number]  Study Date: 10/6/2017  Location: [address] [place] [address] studySonographer: Heriberto Nascimento  Study quality: Technically good  Referring Physician: Jayce Beltran MD  Blood Pressure: 192/87 mmHg  Height: 168 cm  Weight: 113 kg  BSA: 2.2 m2  ------------------------------------------------------------------------  PROCEDURE: Transthoracic echocardiogram with 2-D, M-Mode  and complete spectral and color flow Doppler.  Verbalconsent was obtained for injection of echo contrast.  Following  intravenous injection of contrast, harmonic  imaging was performed.lot [id number]  INDICATION: Cerebral infarction, unspecified (I63.9)  ------------------------------------------------------------------------  DIMENSIONS:  Dimensions:     Normal Values:  LA:     3.0 cm    2.0 - 4.0 cm  Ao:     3.0 cm    2.0 - 3.8 cm  SEPTUM: 1.2 cm    0.6 - 1.2 cm  PWT:    1.0 cm    0.6 - 1.1 cm  LVIDd:  4.2 cm    3.0 - 5.6 cm  LVIDs:  2.7 cm    1.8 - 4.0 cm  Derived Variables:  LVMI: 71 g/m2  RWT: 0.47  Fractional short: 36 %  Ejection Fraction (Teicholtz): 66 %  ------------------------------------------------------------------------  OBSERVATIONS:  Mitral Valve: Normal mitral valve.  Aortic Root: Normal aortic root.  Aortic Valve: Aortic valve not well visualized; probably  normal.  Left Atrium: Normal left atrium.  Left Ventricle: Endocardial visualization enhanced with  intravenous injection of echo contrast (Definity). Normal  left ventricular systolic function. No segmental wall  motion abnormalities. Increased relative wall thickness  with normal left ventricular mass index, consistent with  concentric left ventricular remodeling. (DT:257 ms).  Right Heart: Normal right atrium. The right ventricle is  not well visualized. Normal tricuspid valve. Normal  pulmonic valve.  Pericardium/PleuraNormal pericardium with no pericardial  effusion.  Hemodynamic: Agitated saline injection reveals no obvious  intracardiac shunt.  ------------------------------------------------------------------------  CONCLUSIONS:  1. Increased relative wall thickness with normal left  ventricular mass index, consistent with concentric left  ventricular remodeling.  2. Endocardial visualization enhanced with intravenous  injection of echo contrast (Definity). Normal left  ventricular systolic function. No segmental wall motion  abnormalities.  3. The right ventricle is not well visualized.  4. Agitated saline injection reveals no obvious  intracardiac shunt.  ------------------------------------------------------------------------  Confirmed on  10/6/2017 - 18:08:57 by Lori Ricardo M.D. RPVI  ------------------------------------------------------------------------    < end of copied text >  < from: CT Angio Neck w/ IV Cont (10.03.17 @ 12:10) >  IMPRESSION:     CT angiography neck: No evidence of hemodynamically significant carotid   or vertebral artery stenosis by NASCET criteria. No evidence of vascular   dissection.    CT angiography brain: No major vessel occlusion or proximal stenosis by   NASCET criteria. No evidence of aneurysm or other vascular malformation.    < end of copied text >  < from: CT Brain Stroke Protocol (10.03.17 @ 09:29) >  IMPRESSION:  Early right basal ganglia infarction.    No acute intracranial hemorrhage.    These findings were discussed with Neurology housestaff on 10/3/2017 at   10.00 AM by Dr. Gonzalez with read back confirmation.              KODY MANNING M.D., RADIOLOGY RESIDENT  This document has been electronically signed.  MINERVA GONZALEZ M.D., ATTENDING RADIOLOGIST  This document has been electronically signed. Oct  3 2017 10:01AM       < end of copied text >  < from: CT Head No Cont (11.21.16 @ 19:02) >  IMPRESSION:    No acute intracranial hemorrhage or mass effect.    < end of copied text > 50 y/o obese female (BMI 39) with a pmh of HTN, HLD, DM2 (AIC 9.5 better controlled with no complications), sarcoidosis and chronic back pain secondary to herniated discs in the cervical and lumbar spine, CVA CTA of head and neck revealed early Rt basal ganglia infarct 10/3/17 recent hospitalization for left sided facial droop, slurred speech and gait disturbance not a candidate for TPA 2/2 outside window with full resolve of symptoms.  Pt followed up with Dr. Libman, Neurologist and Dr. Rivera, Cardiology and reports feeling her heart "racing" with no associated lightheadedness with a sharp pain in the middle of the chest. Pt s/p holtor monitor (no results on chart) and presents for implantable loop recorder for further investigation of possible arrhythmia that might have lead up to stroke-like symptoms.  Pt denies cp, sob or palpitations presently.      < from: TTE with Doppler (w/Cont) (10.06.17 @ 15:36) >    Patient name: TIESHA KIM  YOB: 1965   Age: 51 (F)   MR#: [id number]  Study Date: 10/6/2017  Location: [address] [place] [address] studySonographer: Heriberto Nascimento  Study quality: Technically good  Referring Physician: Jayce Beltran MD  Blood Pressure: 192/87 mmHg  Height: 168 cm  Weight: 113 kg  BSA: 2.2 m2  ------------------------------------------------------------------------  PROCEDURE: Transthoracic echocardiogram with 2-D, M-Mode  and complete spectral and color flow Doppler.  Verbalconsent was obtained for injection of echo contrast.  Following  intravenous injection of contrast, harmonic  imaging was performed.lot [id number]  INDICATION: Cerebral infarction, unspecified (I63.9)  ------------------------------------------------------------------------  DIMENSIONS:  Dimensions:     Normal Values:  LA:     3.0 cm    2.0 - 4.0 cm  Ao:     3.0 cm    2.0 - 3.8 cm  SEPTUM: 1.2 cm    0.6 - 1.2 cm  PWT:    1.0 cm    0.6 - 1.1 cm  LVIDd:  4.2 cm    3.0 - 5.6 cm  LVIDs:  2.7 cm    1.8 - 4.0 cm  Derived Variables:  LVMI: 71 g/m2  RWT: 0.47  Fractional short: 36 %  Ejection Fraction (Teicholtz): 66 %  ------------------------------------------------------------------------  OBSERVATIONS:  Mitral Valve: Normal mitral valve.  Aortic Root: Normal aortic root.  Aortic Valve: Aortic valve not well visualized; probably  normal.  Left Atrium: Normal left atrium.  Left Ventricle: Endocardial visualization enhanced with  intravenous injection of echo contrast (Definity). Normal  left ventricular systolic function. No segmental wall  motion abnormalities. Increased relative wall thickness  with normal left ventricular mass index, consistent with  concentric left ventricular remodeling. (DT:257 ms).  Right Heart: Normal right atrium. The right ventricle is  not well visualized. Normal tricuspid valve. Normal  pulmonic valve.  Pericardium/PleuraNormal pericardium with no pericardial  effusion.  Hemodynamic: Agitated saline injection reveals no obvious  intracardiac shunt.  ------------------------------------------------------------------------  CONCLUSIONS:  1. Increased relative wall thickness with normal left  ventricular mass index, consistent with concentric left  ventricular remodeling.  2. Endocardial visualization enhanced with intravenous  injection of echo contrast (Definity). Normal left  ventricular systolic function. No segmental wall motion  abnormalities.  3. The right ventricle is not well visualized.  4. Agitated saline injection reveals no obvious  intracardiac shunt.  ------------------------------------------------------------------------  Confirmed on  10/6/2017 - 18:08:57 by Lori Ricardo M.D. RPVI  ------------------------------------------------------------------------    < end of copied text >  < from: CT Angio Neck w/ IV Cont (10.03.17 @ 12:10) >  IMPRESSION:     CT angiography neck: No evidence of hemodynamically significant carotid   or vertebral artery stenosis by NASCET criteria. No evidence of vascular   dissection.    CT angiography brain: No major vessel occlusion or proximal stenosis by   NASCET criteria. No evidence of aneurysm or other vascular malformation.    < end of copied text >  < from: CT Brain Stroke Protocol (10.03.17 @ 09:29) >  IMPRESSION:  Early right basal ganglia infarction.    No acute intracranial hemorrhage.    These findings were discussed with Neurology housestaff on 10/3/2017 at   10.00 AM by Dr. Gonzalez with read back confirmation.              KODY MANNING M.D., RADIOLOGY RESIDENT  This document has been electronically signed.  MINERVA GONZALEZ M.D., ATTENDING RADIOLOGIST  This document has been electronically signed. Oct  3 2017 10:01AM       < end of copied text >  < from: CT Head No Cont (11.21.16 @ 19:02) >  IMPRESSION:    No acute intracranial hemorrhage or mass effect.    < end of copied text >

## 2017-11-10 ENCOUNTER — APPOINTMENT (OUTPATIENT)
Dept: ELECTROPHYSIOLOGY | Facility: CLINIC | Age: 52
End: 2017-11-10

## 2017-12-11 ENCOUNTER — APPOINTMENT (OUTPATIENT)
Dept: NEUROLOGY | Facility: CLINIC | Age: 52
End: 2017-12-11
Payer: MEDICAID

## 2017-12-11 VITALS
WEIGHT: 248 LBS | HEIGHT: 66 IN | DIASTOLIC BLOOD PRESSURE: 84 MMHG | SYSTOLIC BLOOD PRESSURE: 162 MMHG | BODY MASS INDEX: 39.86 KG/M2 | HEART RATE: 74 BPM

## 2017-12-11 PROCEDURE — 99215 OFFICE O/P EST HI 40 MIN: CPT

## 2017-12-11 RX ORDER — SITAGLIPTIN AND METFORMIN HYDROCHLORIDE 100; 1000 MG/1; MG/1
100-1000 TABLET, FILM COATED, EXTENDED RELEASE ORAL
Refills: 0 | Status: DISCONTINUED | COMMUNITY
End: 2017-12-11

## 2017-12-11 RX ORDER — CLONIDINE HYDROCHLORIDE 0.2 MG/1
0.2 TABLET ORAL
Refills: 0 | Status: DISCONTINUED | COMMUNITY
End: 2017-12-11

## 2017-12-11 RX ORDER — ATORVASTATIN CALCIUM 20 MG/1
20 TABLET, FILM COATED ORAL
Refills: 0 | Status: DISCONTINUED | COMMUNITY
End: 2017-12-11

## 2017-12-11 RX ORDER — HYDRALAZINE HYDROCHLORIDE 100 MG/1
100 TABLET ORAL
Refills: 0 | Status: DISCONTINUED | COMMUNITY
End: 2017-12-11

## 2017-12-12 LAB
AT III PPP CHRO-ACNC: 96 %
BUN SERPL-MCNC: 9 MG/DL
CARDIOLIPIN AB SER IA-ACNC: NEGATIVE
CREAT SERPL-MCNC: 0.56 MG/DL
FACT VIII ACT/NOR PPP: 177 %
PROT C PPP CHRO-ACNC: 121 %
PROT S AG ACT/NOR PPP IA: 137 %

## 2017-12-14 LAB
B2 GLYCOPROT1 IGA SERPL IA-ACNC: <5 SAU
B2 GLYCOPROT1 IGG SER-ACNC: <5 SGU
B2 GLYCOPROT1 IGM SER-ACNC: <5 SMU
CARDIOLIPIN IGM SER-MCNC: <5 GPL
DEPRECATED CARDIOLIPIN IGA SER: <5 APL

## 2017-12-15 LAB
CARDIOLIPIN IGM SER-MCNC: <5 MPL
DNA PLOIDY SPEC FC-IMP: NORMAL
PS IGA SER QL: <20 U/ML
PS IGG SER QL: <10 U/ML
PS IGM SER QL: <25 U/ML
PTR INTERP: NORMAL

## 2017-12-27 NOTE — DISCHARGE NOTE ADULT - DISCHARGE DATE
Received message back from Dr. Reece:  Lorri Reece MD Carey, Lauren, RN        Caller: Unspecified (Today,  9:11 AM)                     Breo and Dulera are very similar to advair (long acting bronchodilator and steroid)   that could work if what he is allergic too is not those components. Using something different like spiriva would avoid that. Another one is tudorza  (aclidinium). See if either of those are on his formulary.   Thanks     TC       Will send patient a [employer] message to advise patient of this information, as he prefers to communicate this way. Please see 12/27 [facility] encounter.    Sury NASCIMENTO, RN, BSN  Care Coordinator        17-Feb-2017

## 2017-12-28 ENCOUNTER — APPOINTMENT (OUTPATIENT)
Dept: MRI IMAGING | Facility: CLINIC | Age: 52
End: 2017-12-28

## 2017-12-28 ENCOUNTER — OUTPATIENT (OUTPATIENT)
Dept: OUTPATIENT SERVICES | Facility: HOSPITAL | Age: 52
LOS: 1 days | Discharge: HOME | End: 2017-12-28

## 2017-12-28 DIAGNOSIS — Z98.89 OTHER SPECIFIED POSTPROCEDURAL STATES: Chronic | ICD-10-CM

## 2017-12-28 DIAGNOSIS — I63.9 CEREBRAL INFARCTION, UNSPECIFIED: ICD-10-CM

## 2018-01-08 ENCOUNTER — APPOINTMENT (OUTPATIENT)
Dept: HEMATOLOGY ONCOLOGY | Facility: CLINIC | Age: 53
End: 2018-01-08
Payer: MEDICAID

## 2018-01-08 VITALS
RESPIRATION RATE: 16 BRPM | SYSTOLIC BLOOD PRESSURE: 159 MMHG | HEART RATE: 67 BPM | DIASTOLIC BLOOD PRESSURE: 91 MMHG | BODY MASS INDEX: 40.21 KG/M2 | TEMPERATURE: 99 F | HEIGHT: 65.55 IN | WEIGHT: 244.27 LBS | OXYGEN SATURATION: 100 %

## 2018-01-08 PROCEDURE — 99204 OFFICE O/P NEW MOD 45 MIN: CPT

## 2018-01-08 RX ORDER — LORAZEPAM 0.5 MG/1
0.5 TABLET ORAL
Qty: 2 | Refills: 0 | Status: DISCONTINUED | COMMUNITY
Start: 2017-12-11 | End: 2018-01-08

## 2018-01-08 RX ORDER — INSULIN GLARGINE 100 [IU]/ML
100 INJECTION, SOLUTION SUBCUTANEOUS
Refills: 0 | Status: DISCONTINUED | COMMUNITY
End: 2018-01-08

## 2018-01-12 ENCOUNTER — APPOINTMENT (OUTPATIENT)
Dept: ELECTROPHYSIOLOGY | Facility: CLINIC | Age: 53
End: 2018-01-12
Payer: MEDICAID

## 2018-01-12 VITALS
RESPIRATION RATE: 16 BRPM | HEART RATE: 68 BPM | WEIGHT: 239 LBS | SYSTOLIC BLOOD PRESSURE: 132 MMHG | BODY MASS INDEX: 39.34 KG/M2 | HEIGHT: 65.55 IN | DIASTOLIC BLOOD PRESSURE: 80 MMHG

## 2018-01-12 LAB
ALBUMIN SERPL ELPH-MCNC: 3.8 G/DL
ALP BLD-CCNC: 82 U/L
ALT SERPL-CCNC: 11 U/L
ANION GAP SERPL CALC-SCNC: 19 MMOL/L
AST SERPL-CCNC: 15 U/L
BASOPHILS # BLD AUTO: 0.03 K/UL
BASOPHILS NFR BLD AUTO: 0.3 %
BILIRUB SERPL-MCNC: 0.6 MG/DL
BUN SERPL-MCNC: 13 MG/DL
CALCIUM SERPL-MCNC: 9.2 MG/DL
CHLORIDE SERPL-SCNC: 96 MMOL/L
CO2 SERPL-SCNC: 25 MMOL/L
CREAT SERPL-MCNC: 0.9 MG/DL
EOSINOPHIL # BLD AUTO: 0.31 K/UL
EOSINOPHIL NFR BLD AUTO: 2.8 %
HCT VFR BLD CALC: 38.1 %
HGB BLD-MCNC: 11.9 G/DL
IMM GRANULOCYTES NFR BLD AUTO: 0.3 %
LYMPHOCYTES # BLD AUTO: 1.89 K/UL
LYMPHOCYTES NFR BLD AUTO: 17.1 %
MAN DIFF?: NORMAL
MCHC RBC-ENTMCNC: 24.1 PG
MCHC RBC-ENTMCNC: 31.2 GM/DL
MCV RBC AUTO: 77.3 FL
MONOCYTES # BLD AUTO: 0.61 K/UL
MONOCYTES NFR BLD AUTO: 5.5 %
NEUTROPHILS # BLD AUTO: 8.17 K/UL
NEUTROPHILS NFR BLD AUTO: 74 %
PLATELET # BLD AUTO: 364 K/UL
POTASSIUM SERPL-SCNC: 4.2 MMOL/L
PROT SERPL-MCNC: 7.4 G/DL
RBC # BLD: 4.93 M/UL
RBC # FLD: 15.2 %
SODIUM SERPL-SCNC: 140 MMOL/L
WBC # FLD AUTO: 11.04 K/UL

## 2018-01-12 PROCEDURE — 93000 ELECTROCARDIOGRAM COMPLETE: CPT | Mod: 59

## 2018-01-12 PROCEDURE — 93291 INTERROG DEV EVAL SCRMS IP: CPT

## 2018-01-12 PROCEDURE — 99214 OFFICE O/P EST MOD 30 MIN: CPT | Mod: 25

## 2018-01-12 RX ORDER — METOPROLOL TARTRATE 50 MG/1
50 TABLET, FILM COATED ORAL TWICE DAILY
Refills: 0 | Status: ACTIVE | COMMUNITY

## 2018-01-13 ENCOUNTER — NON-APPOINTMENT (OUTPATIENT)
Age: 53
End: 2018-01-13

## 2018-01-25 ENCOUNTER — OUTPATIENT (OUTPATIENT)
Dept: OUTPATIENT SERVICES | Facility: HOSPITAL | Age: 53
LOS: 1 days | End: 2018-01-25

## 2018-01-25 ENCOUNTER — APPOINTMENT (OUTPATIENT)
Dept: CV DIAGNOSITCS | Facility: HOSPITAL | Age: 53
End: 2018-01-25
Payer: MEDICAID

## 2018-01-25 DIAGNOSIS — Z98.89 OTHER SPECIFIED POSTPROCEDURAL STATES: Chronic | ICD-10-CM

## 2018-01-25 DIAGNOSIS — I63.411 CEREBRAL INFARCTION DUE TO EMBOLISM OF RIGHT MIDDLE CEREBRAL ARTERY: ICD-10-CM

## 2018-01-25 LAB
GLUCOSE BLDC GLUCOMTR-MCNC: 225 MG/DL — HIGH (ref 70–99)
HCG UR QL: NEGATIVE — SIGNIFICANT CHANGE UP

## 2018-01-25 PROCEDURE — 93312 ECHO TRANSESOPHAGEAL: CPT | Mod: 26

## 2018-01-25 PROCEDURE — 93325 DOPPLER ECHO COLOR FLOW MAPG: CPT | Mod: 26,GC

## 2018-01-25 PROCEDURE — 76376 3D RENDER W/INTRP POSTPROCES: CPT | Mod: 26

## 2018-01-25 PROCEDURE — 93320 DOPPLER ECHO COMPLETE: CPT | Mod: 26,GC

## 2018-01-30 ENCOUNTER — APPOINTMENT (OUTPATIENT)
Dept: NEUROLOGY | Facility: CLINIC | Age: 53
End: 2018-01-30
Payer: MEDICAID

## 2018-01-30 VITALS
DIASTOLIC BLOOD PRESSURE: 83 MMHG | SYSTOLIC BLOOD PRESSURE: 134 MMHG | BODY MASS INDEX: 40.99 KG/M2 | HEIGHT: 65.5 IN | HEART RATE: 73 BPM | WEIGHT: 249 LBS

## 2018-01-30 PROCEDURE — 99215 OFFICE O/P EST HI 40 MIN: CPT

## 2018-01-31 ENCOUNTER — APPOINTMENT (OUTPATIENT)
Dept: ENDOCRINOLOGY | Facility: CLINIC | Age: 53
End: 2018-01-31
Payer: MEDICAID

## 2018-01-31 VITALS
SYSTOLIC BLOOD PRESSURE: 130 MMHG | OXYGEN SATURATION: 98 % | BODY MASS INDEX: 40.99 KG/M2 | HEART RATE: 78 BPM | HEIGHT: 65.5 IN | WEIGHT: 249 LBS | DIASTOLIC BLOOD PRESSURE: 70 MMHG

## 2018-01-31 PROCEDURE — 99205 OFFICE O/P NEW HI 60 MIN: CPT

## 2018-01-31 RX ORDER — EXENATIDE 250 UG/ML
10 INJECTION SUBCUTANEOUS DAILY
Refills: 0 | Status: DISCONTINUED | COMMUNITY
Start: 2018-01-12 | End: 2018-01-31

## 2018-02-06 RX ORDER — ASPRIN AND EXTENDED-RELEASE DIPYRIDAMOLE 25; 200 MG/1; MG/1
25-200 CAPSULE ORAL TWICE DAILY
Qty: 60 | Refills: 6 | Status: DISCONTINUED | COMMUNITY
Start: 2017-12-11 | End: 2018-02-06

## 2018-02-12 ENCOUNTER — OTHER (OUTPATIENT)
Age: 53
End: 2018-02-12

## 2018-02-15 ENCOUNTER — APPOINTMENT (OUTPATIENT)
Dept: PULMONOLOGY | Facility: CLINIC | Age: 53
End: 2018-02-15
Payer: MEDICAID

## 2018-02-15 VITALS
HEIGHT: 65.5 IN | DIASTOLIC BLOOD PRESSURE: 90 MMHG | RESPIRATION RATE: 18 BRPM | BODY MASS INDEX: 40.99 KG/M2 | OXYGEN SATURATION: 97 % | HEART RATE: 92 BPM | WEIGHT: 249 LBS | SYSTOLIC BLOOD PRESSURE: 160 MMHG | TEMPERATURE: 98.3 F

## 2018-02-15 PROCEDURE — 99204 OFFICE O/P NEW MOD 45 MIN: CPT | Mod: GC

## 2018-02-15 RX ORDER — METFORMIN HYDROCHLORIDE 1000 MG/1
1000 TABLET, FILM COATED, EXTENDED RELEASE ORAL
Qty: 60 | Refills: 0 | Status: DISCONTINUED | COMMUNITY
Start: 2018-01-31 | End: 2018-02-15

## 2018-02-15 RX ORDER — SITAGLIPTIN AND METFORMIN HYDROCHLORIDE 50; 1000 MG/1; MG/1
50-1000 TABLET, FILM COATED ORAL TWICE DAILY
Refills: 0 | Status: DISCONTINUED | COMMUNITY
End: 2018-02-15

## 2018-02-28 ENCOUNTER — OTHER (OUTPATIENT)
Age: 53
End: 2018-02-28

## 2018-03-01 ENCOUNTER — APPOINTMENT (OUTPATIENT)
Dept: CT IMAGING | Facility: IMAGING CENTER | Age: 53
End: 2018-03-01

## 2018-03-08 ENCOUNTER — EMERGENCY (EMERGENCY)
Facility: HOSPITAL | Age: 53
LOS: 1 days | Discharge: HOME | End: 2018-03-08
Attending: EMERGENCY MEDICINE | Admitting: EMERGENCY MEDICINE
Payer: MEDICAID

## 2018-03-08 VITALS
SYSTOLIC BLOOD PRESSURE: 158 MMHG | HEART RATE: 106 BPM | TEMPERATURE: 98 F | DIASTOLIC BLOOD PRESSURE: 114 MMHG | RESPIRATION RATE: 18 BRPM | OXYGEN SATURATION: 100 %

## 2018-03-08 VITALS
DIASTOLIC BLOOD PRESSURE: 86 MMHG | OXYGEN SATURATION: 100 % | HEART RATE: 78 BPM | RESPIRATION RATE: 18 BRPM | SYSTOLIC BLOOD PRESSURE: 160 MMHG

## 2018-03-08 DIAGNOSIS — Z98.89 OTHER SPECIFIED POSTPROCEDURAL STATES: Chronic | ICD-10-CM

## 2018-03-08 PROCEDURE — 99283 EMERGENCY DEPT VISIT LOW MDM: CPT

## 2018-03-08 RX ORDER — ACETAMINOPHEN 500 MG
975 TABLET ORAL ONCE
Qty: 0 | Refills: 0 | Status: COMPLETED | OUTPATIENT
Start: 2018-03-08 | End: 2018-03-08

## 2018-03-08 RX ORDER — KETOROLAC TROMETHAMINE 30 MG/ML
30 SYRINGE (ML) INJECTION ONCE
Qty: 0 | Refills: 0 | Status: DISCONTINUED | OUTPATIENT
Start: 2018-03-08 | End: 2018-03-08

## 2018-03-08 RX ORDER — LIDOCAINE 4 G/100G
1 CREAM TOPICAL ONCE
Qty: 0 | Refills: 0 | Status: COMPLETED | OUTPATIENT
Start: 2018-03-08 | End: 2018-03-08

## 2018-03-08 RX ADMIN — Medication 975 MILLIGRAM(S): at 23:13

## 2018-03-08 RX ADMIN — Medication 975 MILLIGRAM(S): at 23:44

## 2018-03-08 RX ADMIN — LIDOCAINE 1 PATCH: 4 CREAM TOPICAL at 23:44

## 2018-03-08 RX ADMIN — Medication 30 MILLIGRAM(S): at 23:05

## 2018-03-08 RX ADMIN — Medication 60 MILLIGRAM(S): at 20:56

## 2018-03-08 RX ADMIN — Medication 30 MILLIGRAM(S): at 20:59

## 2018-03-08 NOTE — ED PROVIDER NOTE - SHIFT CHANGE DETAILS
I have signed over this patient to the above attending physician. Pertinent history, physical exam findings and workup thus far in the ED have been discussed. The pending tests and plan, including reassessment were signed over.  All questions from the above attending physician have been answered.

## 2018-03-08 NOTE — ED PROVIDER NOTE - MUSCULOSKELETAL MINIMAL EXAM
reproducible right piriformis ttp. s1 intact bilaterally. no saddle anesthesia. no midline spinal tenderness reproducible right piriformis ttp. s1 intact bilaterally. no saddle anesthesia. no midline spinal tenderness, 2+ distal pulses x 4 extremities

## 2018-03-08 NOTE — ED PROVIDER NOTE - OBJECTIVE STATEMENT
52y F with hx of DM, HTN, and herniated lumbar discs presents to the ED with right sided back and buttock pain radiating down leg x 5 days. +associated paresthesias to the lateral aspect of the leg. No weakness, no urinary or fecal incontinence or retention, no saddle anesthesia. no recent hx of trauma or heavy lifting. No dysuria, no hematuria.

## 2018-03-08 NOTE — ED PROVIDER NOTE - CARE PLAN
Principal Discharge DX:	Back pain Principal Discharge DX:	Back pain  Assessment and plan of treatment:	Follow up with your medical doctor in 2 days for re-evaluation.  Recommend following up with orthopedic spine doctor (see referral list); call for appointment.  Medications:  ibuprofen 600 mg: Take one tablet every 6 hours as needed for pain / inflammation (take with food) Tale Cyclbenzoprine 5 mg:  Take one tablet every 8 hours for muscle relaxation (may make you drowsy; do not drive on this medication).  Rest / gentle SYMMETRICAL body movements / light activity.  Minimize bending / carrying / lifting.  Return to the Emergency Department if any new or worsening symptoms or for any concerns / issues / problems.

## 2018-03-08 NOTE — ED PROVIDER NOTE - MEDICAL DECISION MAKING DETAILS
52y F with musculoskeletal back pain. No red flags or concerns for core compression. Will treat symptomatically and reassess.

## 2018-03-08 NOTE — ED PROVIDER NOTE - CHPI ED SYMPTOMS NEG
no bowel dysfunction/no bladder dysfunction/no weakness, no saddle anesthesia, no hematuria, no dysuria

## 2018-03-08 NOTE — ED PROVIDER NOTE - PROGRESS NOTE DETAILS
Geovany: Pt reassessed by me, pain is no better. valium ordered. will continue to reassess. Geovany: Pt now resting comfortably but still reporting pain is not better. will try lidocaine patch and reassess. JENNIFER Chaney: pt admits to feeling better than before, still have some back pain, pt instructed to follow up with spine center, Ibuprofen, cyclobenzaprine and lidocaine patches will help relieve the pain.

## 2018-03-08 NOTE — ED PROVIDER NOTE - PLAN OF CARE
Follow up with your medical doctor in 2 days for re-evaluation.  Recommend following up with orthopedic spine doctor (see referral list); call for appointment.  Medications:  ibuprofen 600 mg: Take one tablet every 6 hours as needed for pain / inflammation (take with food) Tale Cyclbenzoprine 5 mg:  Take one tablet every 8 hours for muscle relaxation (may make you drowsy; do not drive on this medication).  Rest / gentle SYMMETRICAL body movements / light activity.  Minimize bending / carrying / lifting.  Return to the Emergency Department if any new or worsening symptoms or for any concerns / issues / problems.

## 2018-03-09 RX ORDER — CYCLOBENZAPRINE HYDROCHLORIDE 10 MG/1
1 TABLET, FILM COATED ORAL
Qty: 15 | Refills: 0 | OUTPATIENT
Start: 2018-03-09 | End: 2018-03-13

## 2018-03-14 ENCOUNTER — FORM ENCOUNTER (OUTPATIENT)
Age: 53
End: 2018-03-14

## 2018-03-14 ENCOUNTER — APPOINTMENT (OUTPATIENT)
Dept: PULMONOLOGY | Facility: CLINIC | Age: 53
End: 2018-03-14
Payer: MEDICAID

## 2018-03-14 PROCEDURE — 94726 PLETHYSMOGRAPHY LUNG VOLUMES: CPT

## 2018-03-14 PROCEDURE — 94060 EVALUATION OF WHEEZING: CPT

## 2018-03-14 PROCEDURE — 94729 DIFFUSING CAPACITY: CPT

## 2018-03-15 ENCOUNTER — OUTPATIENT (OUTPATIENT)
Dept: OUTPATIENT SERVICES | Facility: HOSPITAL | Age: 53
LOS: 1 days | End: 2018-03-15
Payer: MEDICAID

## 2018-03-15 ENCOUNTER — APPOINTMENT (OUTPATIENT)
Dept: CT IMAGING | Facility: IMAGING CENTER | Age: 53
End: 2018-03-15
Payer: MEDICAID

## 2018-03-15 DIAGNOSIS — D86.9 SARCOIDOSIS, UNSPECIFIED: ICD-10-CM

## 2018-03-15 DIAGNOSIS — Z98.89 OTHER SPECIFIED POSTPROCEDURAL STATES: Chronic | ICD-10-CM

## 2018-03-15 PROCEDURE — 71250 CT THORAX DX C-: CPT | Mod: 26

## 2018-03-15 PROCEDURE — 71250 CT THORAX DX C-: CPT

## 2018-03-28 ENCOUNTER — APPOINTMENT (OUTPATIENT)
Dept: ENDOCRINOLOGY | Facility: CLINIC | Age: 53
End: 2018-03-28
Payer: MEDICAID

## 2018-03-28 VITALS
SYSTOLIC BLOOD PRESSURE: 160 MMHG | BODY MASS INDEX: 40.99 KG/M2 | HEART RATE: 76 BPM | DIASTOLIC BLOOD PRESSURE: 90 MMHG | RESPIRATION RATE: 16 BRPM | WEIGHT: 249 LBS | OXYGEN SATURATION: 98 % | HEIGHT: 65.5 IN

## 2018-03-28 LAB
GLUCOSE BLDC GLUCOMTR-MCNC: 172
HBA1C MFR BLD HPLC: 10

## 2018-03-28 PROCEDURE — 83036 HEMOGLOBIN GLYCOSYLATED A1C: CPT | Mod: QW

## 2018-03-28 PROCEDURE — 82962 GLUCOSE BLOOD TEST: CPT

## 2018-03-28 PROCEDURE — 99215 OFFICE O/P EST HI 40 MIN: CPT | Mod: 25

## 2018-04-20 ENCOUNTER — APPOINTMENT (OUTPATIENT)
Dept: SLEEP CENTER | Facility: CLINIC | Age: 53
End: 2018-04-20
Payer: MEDICAID

## 2018-04-20 ENCOUNTER — OUTPATIENT (OUTPATIENT)
Dept: OUTPATIENT SERVICES | Facility: HOSPITAL | Age: 53
LOS: 1 days | End: 2018-04-20
Payer: MEDICAID

## 2018-04-20 DIAGNOSIS — Z98.89 OTHER SPECIFIED POSTPROCEDURAL STATES: Chronic | ICD-10-CM

## 2018-04-20 PROCEDURE — 95810 POLYSOM 6/> YRS 4/> PARAM: CPT

## 2018-04-20 PROCEDURE — 95810 POLYSOM 6/> YRS 4/> PARAM: CPT | Mod: 26

## 2018-04-23 DIAGNOSIS — G47.33 OBSTRUCTIVE SLEEP APNEA (ADULT) (PEDIATRIC): ICD-10-CM

## 2018-05-11 ENCOUNTER — RESULT REVIEW (OUTPATIENT)
Age: 53
End: 2018-05-11

## 2018-05-16 ENCOUNTER — RESULT REVIEW (OUTPATIENT)
Age: 53
End: 2018-05-16

## 2018-05-22 ENCOUNTER — APPOINTMENT (OUTPATIENT)
Dept: PULMONOLOGY | Facility: CLINIC | Age: 53
End: 2018-05-22
Payer: MEDICAID

## 2018-05-22 VITALS
BODY MASS INDEX: 40.65 KG/M2 | RESPIRATION RATE: 15 BRPM | OXYGEN SATURATION: 98 % | SYSTOLIC BLOOD PRESSURE: 134 MMHG | HEART RATE: 95 BPM | HEIGHT: 65 IN | WEIGHT: 244 LBS | TEMPERATURE: 98 F | DIASTOLIC BLOOD PRESSURE: 72 MMHG

## 2018-05-22 DIAGNOSIS — R06.83 SNORING: ICD-10-CM

## 2018-05-22 DIAGNOSIS — F51.04 PSYCHOPHYSIOLOGIC INSOMNIA: ICD-10-CM

## 2018-05-22 PROCEDURE — 99215 OFFICE O/P EST HI 40 MIN: CPT | Mod: GC

## 2018-06-21 ENCOUNTER — APPOINTMENT (OUTPATIENT)
Dept: OPHTHALMOLOGY | Facility: CLINIC | Age: 53
End: 2018-06-21
Payer: MEDICAID

## 2018-06-21 PROCEDURE — 92250 FUNDUS PHOTOGRAPHY W/I&R: CPT

## 2018-06-21 PROCEDURE — 99204 OFFICE O/P NEW MOD 45 MIN: CPT

## 2018-06-21 PROCEDURE — 76514 ECHO EXAM OF EYE THICKNESS: CPT

## 2018-06-27 ENCOUNTER — APPOINTMENT (OUTPATIENT)
Dept: ENDOCRINOLOGY | Facility: CLINIC | Age: 53
End: 2018-06-27
Payer: MEDICAID

## 2018-06-27 VITALS
SYSTOLIC BLOOD PRESSURE: 138 MMHG | RESPIRATION RATE: 16 BRPM | OXYGEN SATURATION: 99 % | DIASTOLIC BLOOD PRESSURE: 80 MMHG | HEART RATE: 86 BPM | HEIGHT: 65 IN

## 2018-06-27 DIAGNOSIS — Z86.39 PERSONAL HISTORY OF OTHER ENDOCRINE, NUTRITIONAL AND METABOLIC DISEASE: ICD-10-CM

## 2018-06-27 PROCEDURE — 83036 HEMOGLOBIN GLYCOSYLATED A1C: CPT | Mod: QW

## 2018-06-27 PROCEDURE — 82962 GLUCOSE BLOOD TEST: CPT

## 2018-06-27 PROCEDURE — 99215 OFFICE O/P EST HI 40 MIN: CPT

## 2018-06-28 ENCOUNTER — APPOINTMENT (OUTPATIENT)
Dept: NEUROLOGY | Facility: CLINIC | Age: 53
End: 2018-06-28
Payer: MEDICAID

## 2018-06-28 VITALS — HEART RATE: 91 BPM | DIASTOLIC BLOOD PRESSURE: 91 MMHG | SYSTOLIC BLOOD PRESSURE: 150 MMHG

## 2018-06-28 VITALS — HEIGHT: 65 IN | WEIGHT: 244 LBS | BODY MASS INDEX: 40.65 KG/M2

## 2018-06-28 PROCEDURE — 99215 OFFICE O/P EST HI 40 MIN: CPT

## 2018-06-28 RX ORDER — LOSARTAN POTASSIUM 50 MG/1
50 TABLET, FILM COATED ORAL DAILY
Refills: 0 | Status: DISCONTINUED | COMMUNITY
End: 2018-06-28

## 2018-06-29 LAB
GLUCOSE BLDC GLUCOMTR-MCNC: 296
HBA1C MFR BLD HPLC: 10.5

## 2018-07-13 ENCOUNTER — APPOINTMENT (OUTPATIENT)
Dept: ELECTROPHYSIOLOGY | Facility: CLINIC | Age: 53
End: 2018-07-13
Payer: MEDICAID

## 2018-07-13 VITALS — RESPIRATION RATE: 16 BRPM | HEART RATE: 58 BPM | DIASTOLIC BLOOD PRESSURE: 80 MMHG | SYSTOLIC BLOOD PRESSURE: 124 MMHG

## 2018-07-13 PROCEDURE — 93285 PRGRMG DEV EVAL SCRMS IP: CPT

## 2018-07-13 RX ORDER — DULAGLUTIDE 1.5 MG/.5ML
1.5 INJECTION, SOLUTION SUBCUTANEOUS
Qty: 3 | Refills: 3 | Status: DISCONTINUED | COMMUNITY
Start: 2018-01-31 | End: 2018-07-13

## 2018-07-16 LAB — PA ADP PRP-ACNC: 215 PRU

## 2018-07-17 ENCOUNTER — APPOINTMENT (OUTPATIENT)
Dept: NEUROLOGY | Facility: CLINIC | Age: 53
End: 2018-07-17
Payer: MEDICAID

## 2018-07-17 PROCEDURE — 93892 TCD EMBOLI DETECT W/O INJ: CPT

## 2018-07-17 PROCEDURE — 93880 EXTRACRANIAL BILAT STUDY: CPT

## 2018-07-17 PROCEDURE — 93886 INTRACRANIAL COMPLETE STUDY: CPT

## 2018-07-20 NOTE — DISCHARGE NOTE ADULT - MEDICATION SUMMARY - MEDICATIONS TO STOP TAKING
No pertinent past medical history
I will STOP taking the medications listed below when I get home from the hospital:    lisinopril 40 mg oral tablet  -- 1 tab(s) by mouth once a day    hydrALAZINE 100 mg oral tablet  -- 1 tab(s) by mouth 3 times a day    cloNIDine 0.2 mg oral tablet  -- 1 tab(s) by mouth 2 times a day    labetalol 300 mg oral tablet  -- 2 tab(s) by mouth 3 times a day

## 2018-08-07 PROBLEM — E78.5 HYPERLIPIDEMIA, UNSPECIFIED: Chronic | Status: ACTIVE | Noted: 2017-10-03

## 2018-08-09 ENCOUNTER — APPOINTMENT (OUTPATIENT)
Dept: OPHTHALMOLOGY | Facility: CLINIC | Age: 53
End: 2018-08-09
Payer: MEDICAID

## 2018-08-09 PROCEDURE — 92012 INTRM OPH EXAM EST PATIENT: CPT

## 2018-08-09 PROCEDURE — 92250 FUNDUS PHOTOGRAPHY W/I&R: CPT

## 2018-08-09 PROCEDURE — 92083 EXTENDED VISUAL FIELD XM: CPT

## 2018-08-22 ENCOUNTER — APPOINTMENT (OUTPATIENT)
Dept: ENDOCRINOLOGY | Facility: CLINIC | Age: 53
End: 2018-08-22
Payer: MEDICAID

## 2018-08-22 VITALS
DIASTOLIC BLOOD PRESSURE: 90 MMHG | RESPIRATION RATE: 16 BRPM | HEIGHT: 65 IN | HEART RATE: 72 BPM | OXYGEN SATURATION: 99 % | SYSTOLIC BLOOD PRESSURE: 130 MMHG | WEIGHT: 244 LBS | BODY MASS INDEX: 40.65 KG/M2

## 2018-08-22 LAB
GLUCOSE BLDC GLUCOMTR-MCNC: 133
HBA1C MFR BLD HPLC: 8.7

## 2018-08-22 PROCEDURE — 99214 OFFICE O/P EST MOD 30 MIN: CPT | Mod: 25

## 2018-08-22 PROCEDURE — 82962 GLUCOSE BLOOD TEST: CPT

## 2018-08-22 PROCEDURE — 83036 HEMOGLOBIN GLYCOSYLATED A1C: CPT | Mod: QW

## 2018-08-24 ENCOUNTER — APPOINTMENT (OUTPATIENT)
Dept: SURGERY | Facility: CLINIC | Age: 53
End: 2018-08-24
Payer: MEDICAID

## 2018-08-24 PROCEDURE — 99215 OFFICE O/P EST HI 40 MIN: CPT

## 2018-08-24 RX ORDER — METFORMIN ER 750 MG 750 MG/1
750 TABLET ORAL
Qty: 60 | Refills: 3 | Status: DISCONTINUED | COMMUNITY
Start: 2018-02-12 | End: 2018-08-24

## 2018-08-24 RX ORDER — INSULIN LISPRO 100 [IU]/ML
100 INJECTION, SOLUTION INTRAVENOUS; SUBCUTANEOUS
Qty: 1 | Refills: 0 | Status: DISCONTINUED | COMMUNITY
Start: 2018-03-28 | End: 2018-08-24

## 2018-09-01 ENCOUNTER — OUTPATIENT (OUTPATIENT)
Dept: OUTPATIENT SERVICES | Facility: HOSPITAL | Age: 53
LOS: 1 days | End: 2018-09-01
Payer: MEDICAID

## 2018-09-01 DIAGNOSIS — Z98.89 OTHER SPECIFIED POSTPROCEDURAL STATES: Chronic | ICD-10-CM

## 2018-09-01 PROCEDURE — G9001: CPT

## 2018-09-07 ENCOUNTER — OUTPATIENT (OUTPATIENT)
Dept: OUTPATIENT SERVICES | Facility: HOSPITAL | Age: 53
LOS: 1 days | End: 2018-09-07
Payer: MEDICAID

## 2018-09-07 ENCOUNTER — EMERGENCY (EMERGENCY)
Facility: HOSPITAL | Age: 53
LOS: 1 days | Discharge: HOME | End: 2018-09-07
Attending: EMERGENCY MEDICINE | Admitting: EMERGENCY MEDICINE
Payer: MEDICAID

## 2018-09-07 VITALS
DIASTOLIC BLOOD PRESSURE: 80 MMHG | SYSTOLIC BLOOD PRESSURE: 138 MMHG | OXYGEN SATURATION: 100 % | TEMPERATURE: 98 F | HEART RATE: 82 BPM | RESPIRATION RATE: 16 BRPM

## 2018-09-07 VITALS
RESPIRATION RATE: 16 BRPM | DIASTOLIC BLOOD PRESSURE: 87 MMHG | SYSTOLIC BLOOD PRESSURE: 144 MMHG | TEMPERATURE: 98 F | OXYGEN SATURATION: 98 % | HEART RATE: 98 BPM | WEIGHT: 244.93 LBS | HEIGHT: 67 IN

## 2018-09-07 DIAGNOSIS — Z01.818 ENCOUNTER FOR OTHER PREPROCEDURAL EXAMINATION: ICD-10-CM

## 2018-09-07 DIAGNOSIS — E66.9 OBESITY, UNSPECIFIED: ICD-10-CM

## 2018-09-07 DIAGNOSIS — E66.01 MORBID (SEVERE) OBESITY DUE TO EXCESS CALORIES: ICD-10-CM

## 2018-09-07 DIAGNOSIS — Z98.89 OTHER SPECIFIED POSTPROCEDURAL STATES: Chronic | ICD-10-CM

## 2018-09-07 DIAGNOSIS — Z95.818 PRESENCE OF OTHER CARDIAC IMPLANTS AND GRAFTS: Chronic | ICD-10-CM

## 2018-09-07 LAB
ALBUMIN SERPL ELPH-MCNC: 4 G/DL — SIGNIFICANT CHANGE UP (ref 3.3–5)
ALP SERPL-CCNC: 78 U/L — SIGNIFICANT CHANGE UP (ref 40–120)
ALT FLD-CCNC: 10 U/L — SIGNIFICANT CHANGE UP (ref 10–45)
ANION GAP SERPL CALC-SCNC: 15 MMOL/L — SIGNIFICANT CHANGE UP (ref 5–17)
AST SERPL-CCNC: 18 U/L — SIGNIFICANT CHANGE UP (ref 10–40)
BILIRUB SERPL-MCNC: 0.7 MG/DL — SIGNIFICANT CHANGE UP (ref 0.2–1.2)
BLD GP AB SCN SERPL QL: NEGATIVE — SIGNIFICANT CHANGE UP
BUN SERPL-MCNC: 18 MG/DL — SIGNIFICANT CHANGE UP (ref 7–23)
CALCIUM SERPL-MCNC: 9.6 MG/DL — SIGNIFICANT CHANGE UP (ref 8.4–10.5)
CHLORIDE SERPL-SCNC: 98 MMOL/L — SIGNIFICANT CHANGE UP (ref 96–108)
CO2 SERPL-SCNC: 27 MMOL/L — SIGNIFICANT CHANGE UP (ref 22–31)
CREAT SERPL-MCNC: 0.79 MG/DL — SIGNIFICANT CHANGE UP (ref 0.5–1.3)
GLUCOSE SERPL-MCNC: 128 MG/DL — HIGH (ref 70–99)
HBA1C BLD-MCNC: 8.6 % — HIGH (ref 4–5.6)
HCT VFR BLD CALC: 37.3 % — SIGNIFICANT CHANGE UP (ref 34.5–45)
HGB BLD-MCNC: 11.9 G/DL — SIGNIFICANT CHANGE UP (ref 11.5–15.5)
MCHC RBC-ENTMCNC: 24 PG — LOW (ref 27–34)
MCHC RBC-ENTMCNC: 31.9 GM/DL — LOW (ref 32–36)
MCV RBC AUTO: 75.4 FL — LOW (ref 80–100)
PLATELET # BLD AUTO: 340 K/UL — SIGNIFICANT CHANGE UP (ref 150–400)
POTASSIUM SERPL-MCNC: 3.5 MMOL/L — SIGNIFICANT CHANGE UP (ref 3.5–5.3)
POTASSIUM SERPL-SCNC: 3.5 MMOL/L — SIGNIFICANT CHANGE UP (ref 3.5–5.3)
PROT SERPL-MCNC: 7.3 G/DL — SIGNIFICANT CHANGE UP (ref 6–8.3)
RBC # BLD: 4.95 M/UL — SIGNIFICANT CHANGE UP (ref 3.8–5.2)
RBC # FLD: 14.5 % — SIGNIFICANT CHANGE UP (ref 10.3–14.5)
RH IG SCN BLD-IMP: POSITIVE — SIGNIFICANT CHANGE UP
SODIUM SERPL-SCNC: 140 MMOL/L — SIGNIFICANT CHANGE UP (ref 135–145)
WBC # BLD: 7.01 K/UL — SIGNIFICANT CHANGE UP (ref 3.8–10.5)
WBC # FLD AUTO: 7.01 K/UL — SIGNIFICANT CHANGE UP (ref 3.8–10.5)

## 2018-09-07 PROCEDURE — 85027 COMPLETE CBC AUTOMATED: CPT

## 2018-09-07 PROCEDURE — G0463: CPT

## 2018-09-07 PROCEDURE — 99283 EMERGENCY DEPT VISIT LOW MDM: CPT

## 2018-09-07 PROCEDURE — 73090 X-RAY EXAM OF FOREARM: CPT | Mod: 26,RT

## 2018-09-07 PROCEDURE — 86850 RBC ANTIBODY SCREEN: CPT

## 2018-09-07 PROCEDURE — 80053 COMPREHEN METABOLIC PANEL: CPT

## 2018-09-07 PROCEDURE — 73110 X-RAY EXAM OF WRIST: CPT | Mod: 26,RT

## 2018-09-07 PROCEDURE — 86900 BLOOD TYPING SEROLOGIC ABO: CPT

## 2018-09-07 PROCEDURE — 83036 HEMOGLOBIN GLYCOSYLATED A1C: CPT

## 2018-09-07 PROCEDURE — 73130 X-RAY EXAM OF HAND: CPT | Mod: 26,RT

## 2018-09-07 PROCEDURE — 86901 BLOOD TYPING SEROLOGIC RH(D): CPT

## 2018-09-07 RX ORDER — PYRIDOXINE HCL (VITAMIN B6) 100 MG
1 TABLET ORAL
Qty: 0 | Refills: 0 | COMMUNITY

## 2018-09-07 NOTE — H&P PST ADULT - NEGATIVE CARDIOVASCULAR SYMPTOMS
no claudication/no peripheral edema/no orthopnea/no dyspnea on exertion/no paroxysmal nocturnal dyspnea/no palpitations

## 2018-09-07 NOTE — H&P PST ADULT - PROBLEM SELECTOR PLAN 6
Lovenox SQ The Caprini score indicates this patient is at risk for a VTE event (score 3-5).  Most surgical patients in this group would benefit from pharmacologic prophylaxis.  The surgical team will determine the balance between VTE risk and bleeding risk
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
## 2018-09-07 NOTE — H&P PST ADULT - PROBLEM/PLAN-1
Has sx only with anxiety.  Stable on acyclovir daily.  Will inc dose with breakouts.  No current sx   DISPLAY PLAN FREE TEXT
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
## 2018-09-07 NOTE — H&P PST ADULT - NSANTHOSAYNRD_GEN_A_CORE
No. EBEN screening performed.  STOP BANG Legend: 0-2 = LOW Risk; 3-4 = INTERMEDIATE Risk; 5-8 = HIGH Risk

## 2018-09-07 NOTE — H&P PST ADULT - PROBLEM SELECTOR PLAN 3
Hold antihypertensives for now to allow for permissive HTN up to 220/110  Monitor BP q4hrs  Sodium restriction Instructed to continue meds &  take with sips of water in AM the day of surgery

## 2018-09-07 NOTE — H&P PST ADULT - OTHER CARE PROVIDERS
Neurologist Dr Viral Mathew [phone] next visit next week pre op, Cardiologist Dr Kamari Rizvi [phone](last visit 6 month ago), Endo Dr Masood Wyatt [phone] last visit 08/24/18 Neurologist Dr Viral Mathew [phone] next visit coming week pre op, Cardiologist Dr Kamari Rizvi [phone] last visit with in 6 months , Endo Dr Masood Wyatt [phone] last visit 08/24/18

## 2018-09-07 NOTE — H&P PST ADULT - PSH
H/O hand surgery  FEB 2016  Status post placement of implantable loop recorder  10/30/17 after stroke & so far everything normal, next appointment 01/19

## 2018-09-07 NOTE — H&P PST ADULT - PMH
Chronic back pain    DM (diabetes mellitus)  2  Hiatal hernia    HLD (hyperlipidemia)    HTN (hypertension)    Sarcoidosis  on CT chest by Dr Mercer 2018-unchanged  Stroke  10/03/17  s/p left side weakness

## 2018-09-07 NOTE — H&P PST ADULT - ASSESSMENT
CAPRINI SCORE [CLOT]    AGE RELATED RISK FACTORS                                                       MOBILITY RELATED FACTORS  [x ] Age 41-60 years                                            (1 Point)                  [ ] Bed rest                                                        (1 Point)  [ ] Age: 61-74 years                                           (2 Points)                 [ ] Plaster cast                                                   (2 Points)  [ ] Age= 75 years                                              (3 Points)                 [ ] Bed bound for more than 72 hours                 (2 Points)    DISEASE RELATED RISK FACTORS                                               GENDER SPECIFIC FACTORS  [ ] Edema in the lower extremities                       (1 Point)                  [ ] Pregnancy                                                     (1 Point)  [ ] Varicose veins                                               (1 Point)                  [ ] Post-partum < 6 weeks                                   (1 Point)             [x ] BMI > 25 Kg/m2                                            (1 Point)                  [ ] Hormonal therapy  or oral contraception          (1 Point)                 [ ] Sepsis (in the previous month)                        (1 Point)                  [ ] History of pregnancy complications                 (1 point)  [ ] Pneumonia or serious lung disease                                               [ ] Unexplained or recurrent                     (1 Point)           (in the previous month)                               (1 Point)  [ ] Abnormal pulmonary function test                     (1 Point)                 SURGERY RELATED RISK FACTORS  [ ] Acute myocardial infarction                              (1 Point)                 [ ]  Section                                             (1 Point)  [ ] Congestive heart failure (in the previous month)  (1 Point)               [ ] Minor surgery                                                  (1 Point)   [ ] Inflammatory bowel disease                             (1 Point)                 [ ] Arthroscopic surgery                                        (2 Points)  [ ] Central venous access                                      (2 Points)                x[ ] General surgery lasting more than 45 minutes   (2 Points)       [ ] Stroke (in the previous month)                          (5 Points)               [ ] Elective arthroplasty                                         (5 Points)                                                                                                                                               HEMATOLOGY RELATED FACTORS                                                 TRAUMA RELATED RISK FACTORS  [ ] Prior episodes of VTE                                     (3 Points)                 [ ] Fracture of the hip, pelvis, or leg                       (5 Points)  [ ] Positive family history for VTE                         (3 Points)                 [ ] Acute spinal cord injury (in the previous month)  (5 Points)  [ ] Prothrombin 93290 A                                     (3 Points)                 [ ] Paralysis  (less than 1 month)                             (5 Points)  [ ] Factor V Leiden                                             (3 Points)                  [ ] Multiple Trauma within 1 month                        (5 Points)  [ ] Lupus anticoagulants                                     (3 Points)                                                           [ ] Anticardiolipin antibodies                               (3 Points)                                                       [ ] High homocysteine in the blood                      (3 Points)                                             [ ] Other congenital or acquired thrombophilia      (3 Points)                                                [ ] Heparin induced thrombocytopenia                  (3 Points)                                          Total Score [      4    ]

## 2018-09-07 NOTE — H&P PST ADULT - NEGATIVE GASTROINTESTINAL SYMPTOMS
no diarrhea/no vomiting/no constipation/no flatulence/no abdominal pain/no hematochezia/no nausea/no change in bowel habits/no melena

## 2018-09-07 NOTE — ED PROVIDER NOTE - PLAN OF CARE
Follow up with your primary care provider within 48 hours  Follow up with an orthopedic doctor within one week  Take Motrin 600mg every 8 hours as needed for pain, take with food  Return to the emergency department with any worsening or concerning symptoms, swelling, numbness/tingling or any other concerns.

## 2018-09-07 NOTE — H&P PST ADULT - PROBLEM SELECTOR PLAN 5
Continue basal insulin with insulin sliding scale  Hold oral hypoglycemics  Check HgA1C, FS  Diabetic diet patient stopped Tanzeum & basaglar  (09/02/18) as per endocrinology advice  Hold oral hypoglycemics in am of surgery  Will follow up with labs/ fingerstick. No insulin in AM of surgery.   HgA1c ordered   Instructed to hold metformin & insulin the morning of surgery  STAT FS  on the day of surgery ordered

## 2018-09-07 NOTE — ED PROVIDER NOTE - CARE PLAN
Assessment and plan of treatment:	Follow up with your primary care provider within 48 hours  Follow up with an orthopedic doctor within one week  Take Motrin 600mg every 8 hours as needed for pain, take with food  Return to the emergency department with any worsening or concerning symptoms, swelling, numbness/tingling or any other concerns. Principal Discharge DX:	Wrist pain, acute  Assessment and plan of treatment:	Follow up with your primary care provider within 48 hours  Follow up with an orthopedic doctor within one week  Take Motrin 600mg every 8 hours as needed for pain, take with food  Return to the emergency department with any worsening or concerning symptoms, swelling, numbness/tingling or any other concerns.
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
## 2018-09-07 NOTE — H&P PST ADULT - PROBLEM SELECTOR PLAN 1
Admit to telemetry, serial EKGs, serial cardiac enzymes  Check CBC, CMP, TSH, FLP, HgA1C, UA  Neuro checks and vitals q4hrs  Fall and aspiration precautions, ambulate with assistance  Neuro consult appreciated  CT angio head/neck pending  MRI brain ordered  Echocardiogram with bubble study ordered  Continue ASA and Lipitor  Permissive HTN up to 220/100  PT, OT and S&S evals ordered (dysphagia diet for now; passed screen)  F/U MD note Robotic laparoscopic sleeve gastrectomy

## 2018-09-07 NOTE — H&P PST ADULT - PROBLEM SELECTOR PLAN 2
Monitor on telemetry (atypical)  Continue ASA, Lipitor  NST from 2015 normal  Serial EKGs and enzymes  Echocardiogram ordered  Will likely not pursue ischemic evaluation given acute CVA continue Plavix 150 mgs to OR, preop plan as neurologist , appointment next week

## 2018-09-07 NOTE — H&P PST ADULT - NEUROLOGICAL DETAILS
cranial nerves intact/responds to pain/hand  weaker on left arm/alert and oriented x 3/responds to verbal commands
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
## 2018-09-07 NOTE — H&P PST ADULT - NEGATIVE NEUROLOGICAL SYMPTOMS
no transient paralysis/no generalized seizures/no focal seizures/no hemiparesis/no loss of consciousness/no syncope/no confusion/no tremors/no headache/no vertigo/no loss of sensation

## 2018-09-11 ENCOUNTER — APPOINTMENT (OUTPATIENT)
Dept: NEUROLOGY | Facility: CLINIC | Age: 53
End: 2018-09-11
Payer: MEDICAID

## 2018-09-11 VITALS
WEIGHT: 242 LBS | SYSTOLIC BLOOD PRESSURE: 133 MMHG | HEIGHT: 65 IN | DIASTOLIC BLOOD PRESSURE: 90 MMHG | HEART RATE: 77 BPM | BODY MASS INDEX: 40.32 KG/M2

## 2018-09-11 PROCEDURE — 99215 OFFICE O/P EST HI 40 MIN: CPT

## 2018-09-11 RX ORDER — PANTOPRAZOLE 40 MG/1
40 TABLET, DELAYED RELEASE ORAL
Refills: 0 | Status: DISCONTINUED | COMMUNITY
End: 2018-09-11

## 2018-09-12 ENCOUNTER — OTHER (OUTPATIENT)
Age: 53
End: 2018-09-12

## 2018-09-14 ENCOUNTER — APPOINTMENT (OUTPATIENT)
Dept: OPHTHALMOLOGY | Facility: CLINIC | Age: 53
End: 2018-09-14
Payer: MEDICAID

## 2018-09-14 LAB — PA ADP PRP-ACNC: 122 PRU

## 2018-09-14 PROCEDURE — 92250 FUNDUS PHOTOGRAPHY W/I&R: CPT

## 2018-09-14 PROCEDURE — 92014 COMPRE OPH EXAM EST PT 1/>: CPT

## 2018-09-18 ENCOUNTER — TRANSCRIPTION ENCOUNTER (OUTPATIENT)
Age: 53
End: 2018-09-18

## 2018-09-18 DIAGNOSIS — Z71.89 OTHER SPECIFIED COUNSELING: ICD-10-CM

## 2018-09-18 NOTE — PHARMACY COMMUNICATION NOTE - COMMENTS
Patient medication reconciliation done. Patient currently taking:     Basaglar 50units subcutaneously at bedtime (held while on liquid diet)  Humalog 10untis subcutaneously three times daily (held while on liquid diet)  Metformin ER 750mg - 2 tabs by mouth daily  Tanzeum 30mg subcutaneously weekly (currently held per endo)  Atorvastatin 80mg by mouth daily  Clopidogrel 150mg by mouth daily - held 1 day prior to surgery (per neuro Dr. Mathew)  Losartan 100mg by mouth daily  Metoprolol tartrate 50mg by mouth twice daily  Nifedipine ER 60mg by mouth daily  Vitamin B12 500mcg by mouth daily  Vitamin D2 50,000units by mouth weekly  Latanoprost 0.005% both eyes daily     Patient was instructed to use crushed, dissolvable, chewable, or liquid formulations of medications for 1 month. Patient was informed to take daily multivitamins post surgically. Patient reeducated on NSAID avoidance (ibuprofen, ASA, naproxen, aleve) as they increased risk of GI bleeding; may use APAP for mild pain otherwise contact prescriber for consult. Patient was informed on indications and directions for administration for hyoscyamine SL, oxycodone liquid, ondansetron ODT, and omeprazole DR. Patient was instructed to take the medications as follows:     Monitor BG and continue diabetes regimen per Dr. Correia (endo)  Switch metformin ER tabs to IR tabs 500 or 850mg by mouth twice daily  and crush (patient needs new Rx)  Hold nifedipine ER after surgery  Crush clopidogrel, losartan, atorvastatin, metoprolol  Continue eyedrops as directed   Continue vitamins/supplements in chewable/dissolvable forms Patient medication reconciliation done. Patient currently taking:     Basaglar 50units subcutaneously at bedtime (held while on liquid diet)  Humalog 10untis subcutaneously three times daily (held while on liquid diet)  Metformin ER 750mg - 2 tabs by mouth daily  Tanzeum 30mg subcutaneously weekly (currently held per endo)  Atorvastatin 80mg by mouth daily  Clopidogrel 150mg by mouth daily - held 1 day prior to surgery (per neuro Dr. Mathew)  Losartan 100mg by mouth daily  Metoprolol tartrate 50mg by mouth twice daily  Nifedipine ER 60mg by mouth daily  Vitamin B12 500mcg by mouth daily  Vitamin D2 50,000units by mouth weekly  Latanoprost 0.005% both eyes daily     Patient was instructed to use crushed, dissolvable, chewable, or liquid formulations of medications for 1 month. Patient was informed to take daily multivitamins post surgically. Patient reeducated on NSAID avoidance (ibuprofen, ASA, naproxen, aleve) as they increased risk of GI bleeding; may use APAP for mild pain otherwise contact prescriber for consult. Patient was informed on indications and directions for administration for hyoscyamine SL, oxycodone liquid, ondansetron ODT, and omeprazole DR. Patient was instructed to take the medications as follows:     Monitor BG and continue diabetes regimen per Dr. Correia (endo)  Hold metformin ER and nifedipine ER after surgery  Crush clopidogrel, losartan, atorvastatin, metoprolol  Continue eyedrops as directed   Continue vitamins/supplements in chewable/dissolvable forms Patient medication reconciliation done. Patient currently taking:     Basaglar 50units subcutaneously at bedtime (held while on liquid diet)  Humalog 10untis subcutaneously three times daily (held while on liquid diet)  Metformin ER 750mg - 2 tabs by mouth daily  Tanzeum 30mg subcutaneously weekly (currently held per endo)  Atorvastatin 80mg by mouth daily  Clopidogrel 150mg by mouth daily - held 1 day prior to surgery (per neuro Dr. Mathew)  Losartan 100mg by mouth daily  Metoprolol tartrate 50mg by mouth twice daily  Nifedipine ER 60mg by mouth daily  Vitamin B12 500mcg by mouth daily  Vitamin D2 50,000units by mouth weekly  Latanoprost 0.005% both eyes daily     Patient was instructed to use crushed, dissolvable, chewable, or liquid formulations of medications for 1 month. Patient was informed to take daily multivitamins post surgically. Patient reeducated on NSAID avoidance (ibuprofen, ASA, naproxen, aleve) as they increased risk of GI bleeding; may use APAP for mild pain otherwise contact prescriber for consult. Patient was informed on indications and directions for administration for hyoscyamine SL, oxycodone liquid, ondansetron ODT, and omeprazole DR. Patient was instructed to take the medications as follows:     Monitor BG and continue diabetes regimen per Dr. Correia (endo)  Hold metformin ER and nifedipine ER after surgery  Crush clopidogrel (resume 9/22/18), losartan, atorvastatin, metoprolol  Continue eyedrops as directed   Continue vitamins/supplements in chewable/dissolvable forms

## 2018-09-19 ENCOUNTER — INPATIENT (INPATIENT)
Facility: HOSPITAL | Age: 53
LOS: 0 days | Discharge: HOME | DRG: 328 | End: 2018-09-20
Attending: SURGERY | Admitting: SURGERY
Payer: MEDICAID

## 2018-09-19 ENCOUNTER — APPOINTMENT (OUTPATIENT)
Dept: SURGERY | Facility: HOSPITAL | Age: 53
End: 2018-09-19
Payer: MEDICAID

## 2018-09-19 ENCOUNTER — RESULT REVIEW (OUTPATIENT)
Age: 53
End: 2018-09-19

## 2018-09-19 VITALS
WEIGHT: 237.88 LBS | TEMPERATURE: 99 F | HEIGHT: 67 IN | DIASTOLIC BLOOD PRESSURE: 88 MMHG | OXYGEN SATURATION: 99 % | HEART RATE: 68 BPM | SYSTOLIC BLOOD PRESSURE: 152 MMHG | RESPIRATION RATE: 20 BRPM

## 2018-09-19 DIAGNOSIS — E66.01 MORBID (SEVERE) OBESITY DUE TO EXCESS CALORIES: ICD-10-CM

## 2018-09-19 DIAGNOSIS — Z95.818 PRESENCE OF OTHER CARDIAC IMPLANTS AND GRAFTS: Chronic | ICD-10-CM

## 2018-09-19 DIAGNOSIS — Z98.89 OTHER SPECIFIED POSTPROCEDURAL STATES: Chronic | ICD-10-CM

## 2018-09-19 LAB
ANION GAP SERPL CALC-SCNC: 16 MMOL/L — SIGNIFICANT CHANGE UP (ref 5–17)
BASOPHILS # BLD AUTO: 0 K/UL — SIGNIFICANT CHANGE UP (ref 0–0.2)
BASOPHILS NFR BLD AUTO: 0.2 % — SIGNIFICANT CHANGE UP (ref 0–2)
BUN SERPL-MCNC: 12 MG/DL — SIGNIFICANT CHANGE UP (ref 7–23)
CALCIUM SERPL-MCNC: 9.1 MG/DL — SIGNIFICANT CHANGE UP (ref 8.4–10.5)
CHLORIDE SERPL-SCNC: 96 MMOL/L — SIGNIFICANT CHANGE UP (ref 96–108)
CO2 SERPL-SCNC: 23 MMOL/L — SIGNIFICANT CHANGE UP (ref 22–31)
CREAT SERPL-MCNC: 0.85 MG/DL — SIGNIFICANT CHANGE UP (ref 0.5–1.3)
EOSINOPHIL # BLD AUTO: 0.1 K/UL — SIGNIFICANT CHANGE UP (ref 0–0.5)
EOSINOPHIL NFR BLD AUTO: 0.5 % — SIGNIFICANT CHANGE UP (ref 0–6)
GLUCOSE BLDC GLUCOMTR-MCNC: 176 MG/DL — HIGH (ref 70–99)
GLUCOSE BLDC GLUCOMTR-MCNC: 199 MG/DL — HIGH (ref 70–99)
GLUCOSE BLDC GLUCOMTR-MCNC: 207 MG/DL — HIGH (ref 70–99)
GLUCOSE BLDC GLUCOMTR-MCNC: 89 MG/DL — SIGNIFICANT CHANGE UP (ref 70–99)
GLUCOSE SERPL-MCNC: 230 MG/DL — HIGH (ref 70–99)
HCG UR QL: NEGATIVE — SIGNIFICANT CHANGE UP
HCT VFR BLD CALC: 39.2 % — SIGNIFICANT CHANGE UP (ref 34.5–45)
HGB BLD-MCNC: 12.5 G/DL — SIGNIFICANT CHANGE UP (ref 11.5–15.5)
LYMPHOCYTES # BLD AUTO: 1.1 K/UL — SIGNIFICANT CHANGE UP (ref 1–3.3)
LYMPHOCYTES # BLD AUTO: 9.7 % — LOW (ref 13–44)
MAGNESIUM SERPL-MCNC: 1.7 MG/DL — SIGNIFICANT CHANGE UP (ref 1.6–2.6)
MCHC RBC-ENTMCNC: 24.4 PG — LOW (ref 27–34)
MCHC RBC-ENTMCNC: 31.8 GM/DL — LOW (ref 32–36)
MCV RBC AUTO: 76.6 FL — LOW (ref 80–100)
MONOCYTES # BLD AUTO: 0.2 K/UL — SIGNIFICANT CHANGE UP (ref 0–0.9)
MONOCYTES NFR BLD AUTO: 1.5 % — LOW (ref 2–14)
NEUTROPHILS # BLD AUTO: 10.4 K/UL — HIGH (ref 1.8–7.4)
NEUTROPHILS NFR BLD AUTO: 88.1 % — HIGH (ref 43–77)
PHOSPHATE SERPL-MCNC: 3.1 MG/DL — SIGNIFICANT CHANGE UP (ref 2.5–4.5)
PLATELET # BLD AUTO: 339 K/UL — SIGNIFICANT CHANGE UP (ref 150–400)
POTASSIUM SERPL-MCNC: 3.6 MMOL/L — SIGNIFICANT CHANGE UP (ref 3.5–5.3)
POTASSIUM SERPL-SCNC: 3.6 MMOL/L — SIGNIFICANT CHANGE UP (ref 3.5–5.3)
RBC # BLD: 5.11 M/UL — SIGNIFICANT CHANGE UP (ref 3.8–5.2)
RBC # FLD: 14.6 % — HIGH (ref 10.3–14.5)
RH IG SCN BLD-IMP: POSITIVE — SIGNIFICANT CHANGE UP
SODIUM SERPL-SCNC: 135 MMOL/L — SIGNIFICANT CHANGE UP (ref 135–145)
WBC # BLD: 11.8 K/UL — HIGH (ref 3.8–10.5)
WBC # FLD AUTO: 11.8 K/UL — HIGH (ref 3.8–10.5)

## 2018-09-19 PROCEDURE — 88305 TISSUE EXAM BY PATHOLOGIST: CPT | Mod: 26

## 2018-09-19 PROCEDURE — 43281 LAP PARAESOPHAG HERN REPAIR: CPT | Mod: 59

## 2018-09-19 PROCEDURE — 43775 LAP SLEEVE GASTRECTOMY: CPT

## 2018-09-19 PROCEDURE — S2900 ROBOTIC SURGICAL SYSTEM: CPT | Mod: NC

## 2018-09-19 RX ORDER — ONDANSETRON 8 MG/1
4 TABLET, FILM COATED ORAL EVERY 6 HOURS
Qty: 0 | Refills: 0 | Status: DISCONTINUED | OUTPATIENT
Start: 2018-09-19 | End: 2018-09-20

## 2018-09-19 RX ORDER — ACETAMINOPHEN 500 MG
1000 TABLET ORAL ONCE
Qty: 0 | Refills: 0 | Status: COMPLETED | OUTPATIENT
Start: 2018-09-19 | End: 2018-09-19

## 2018-09-19 RX ORDER — DEXTROSE 50 % IN WATER 50 %
25 SYRINGE (ML) INTRAVENOUS ONCE
Qty: 0 | Refills: 0 | Status: DISCONTINUED | OUTPATIENT
Start: 2018-09-19 | End: 2018-09-19

## 2018-09-19 RX ORDER — VANCOMYCIN HCL 1 G
1500 VIAL (EA) INTRAVENOUS ONCE
Qty: 0 | Refills: 0 | Status: COMPLETED | OUTPATIENT
Start: 2018-09-19 | End: 2018-09-19

## 2018-09-19 RX ORDER — HEPARIN SODIUM 5000 [USP'U]/ML
5000 INJECTION INTRAVENOUS; SUBCUTANEOUS EVERY 8 HOURS
Qty: 0 | Refills: 0 | Status: DISCONTINUED | OUTPATIENT
Start: 2018-09-19 | End: 2018-09-20

## 2018-09-19 RX ORDER — KETOROLAC TROMETHAMINE 30 MG/ML
30 SYRINGE (ML) INJECTION EVERY 6 HOURS
Qty: 0 | Refills: 0 | Status: DISCONTINUED | OUTPATIENT
Start: 2018-09-19 | End: 2018-09-19

## 2018-09-19 RX ORDER — SODIUM CHLORIDE 9 MG/ML
1000 INJECTION, SOLUTION INTRAVENOUS
Qty: 0 | Refills: 0 | Status: DISCONTINUED | OUTPATIENT
Start: 2018-09-19 | End: 2018-09-19

## 2018-09-19 RX ORDER — ACETAMINOPHEN 500 MG
1000 TABLET ORAL ONCE
Qty: 0 | Refills: 0 | Status: COMPLETED | OUTPATIENT
Start: 2018-09-20 | End: 2018-09-20

## 2018-09-19 RX ORDER — HYOSCYAMINE SULFATE 0.13 MG
0.12 TABLET ORAL EVERY 6 HOURS
Qty: 0 | Refills: 0 | Status: DISCONTINUED | OUTPATIENT
Start: 2018-09-19 | End: 2018-09-20

## 2018-09-19 RX ORDER — DEXTROSE 50 % IN WATER 50 %
25 SYRINGE (ML) INTRAVENOUS ONCE
Qty: 0 | Refills: 0 | Status: DISCONTINUED | OUTPATIENT
Start: 2018-09-19 | End: 2018-09-20

## 2018-09-19 RX ORDER — PANTOPRAZOLE SODIUM 20 MG/1
40 TABLET, DELAYED RELEASE ORAL EVERY 24 HOURS
Qty: 0 | Refills: 0 | Status: DISCONTINUED | OUTPATIENT
Start: 2018-09-19 | End: 2018-09-20

## 2018-09-19 RX ORDER — DEXTROSE 50 % IN WATER 50 %
15 SYRINGE (ML) INTRAVENOUS ONCE
Qty: 0 | Refills: 0 | Status: DISCONTINUED | OUTPATIENT
Start: 2018-09-19 | End: 2018-09-19

## 2018-09-19 RX ORDER — SODIUM CHLORIDE 9 MG/ML
1000 INJECTION, SOLUTION INTRAVENOUS
Qty: 0 | Refills: 0 | Status: DISCONTINUED | OUTPATIENT
Start: 2018-09-19 | End: 2018-09-20

## 2018-09-19 RX ORDER — HEPARIN SODIUM 5000 [USP'U]/ML
5000 INJECTION INTRAVENOUS; SUBCUTANEOUS ONCE
Qty: 0 | Refills: 0 | Status: COMPLETED | OUTPATIENT
Start: 2018-09-19 | End: 2018-09-19

## 2018-09-19 RX ORDER — POTASSIUM CHLORIDE 20 MEQ
10 PACKET (EA) ORAL
Qty: 0 | Refills: 0 | Status: COMPLETED | OUTPATIENT
Start: 2018-09-19 | End: 2018-09-19

## 2018-09-19 RX ORDER — CIPROFLOXACIN LACTATE 400MG/40ML
400 VIAL (ML) INTRAVENOUS ONCE
Qty: 0 | Refills: 0 | Status: DISCONTINUED | OUTPATIENT
Start: 2018-09-19 | End: 2018-09-20

## 2018-09-19 RX ORDER — ONDANSETRON 8 MG/1
4 TABLET, FILM COATED ORAL EVERY 4 HOURS
Qty: 0 | Refills: 0 | Status: DISCONTINUED | OUTPATIENT
Start: 2018-09-19 | End: 2018-09-19

## 2018-09-19 RX ORDER — LIDOCAINE HCL 20 MG/ML
5 VIAL (ML) INJECTION ONCE
Qty: 0 | Refills: 0 | Status: DISCONTINUED | OUTPATIENT
Start: 2018-09-19 | End: 2018-09-20

## 2018-09-19 RX ORDER — DEXTROSE 50 % IN WATER 50 %
15 SYRINGE (ML) INTRAVENOUS ONCE
Qty: 0 | Refills: 0 | Status: DISCONTINUED | OUTPATIENT
Start: 2018-09-19 | End: 2018-09-20

## 2018-09-19 RX ORDER — INSULIN LISPRO 100/ML
VIAL (ML) SUBCUTANEOUS
Qty: 0 | Refills: 0 | Status: DISCONTINUED | OUTPATIENT
Start: 2018-09-19 | End: 2018-09-20

## 2018-09-19 RX ORDER — DEXTROSE 50 % IN WATER 50 %
12.5 SYRINGE (ML) INTRAVENOUS ONCE
Qty: 0 | Refills: 0 | Status: DISCONTINUED | OUTPATIENT
Start: 2018-09-19 | End: 2018-09-20

## 2018-09-19 RX ORDER — SODIUM CHLORIDE 9 MG/ML
3 INJECTION INTRAMUSCULAR; INTRAVENOUS; SUBCUTANEOUS EVERY 8 HOURS
Qty: 0 | Refills: 0 | Status: DISCONTINUED | OUTPATIENT
Start: 2018-09-19 | End: 2018-09-19

## 2018-09-19 RX ORDER — INSULIN LISPRO 100/ML
VIAL (ML) SUBCUTANEOUS EVERY 6 HOURS
Qty: 0 | Refills: 0 | Status: DISCONTINUED | OUTPATIENT
Start: 2018-09-19 | End: 2018-09-19

## 2018-09-19 RX ORDER — GLUCAGON INJECTION, SOLUTION 0.5 MG/.1ML
1 INJECTION, SOLUTION SUBCUTANEOUS ONCE
Qty: 0 | Refills: 0 | Status: DISCONTINUED | OUTPATIENT
Start: 2018-09-19 | End: 2018-09-20

## 2018-09-19 RX ORDER — FENTANYL CITRATE 50 UG/ML
25 INJECTION INTRAVENOUS
Qty: 0 | Refills: 0 | Status: DISCONTINUED | OUTPATIENT
Start: 2018-09-19 | End: 2018-09-19

## 2018-09-19 RX ORDER — LIDOCAINE HCL 20 MG/ML
0.2 VIAL (ML) INJECTION ONCE
Qty: 0 | Refills: 0 | Status: COMPLETED | OUTPATIENT
Start: 2018-09-19 | End: 2018-09-19

## 2018-09-19 RX ORDER — INSULIN LISPRO 100/ML
VIAL (ML) SUBCUTANEOUS AT BEDTIME
Qty: 0 | Refills: 0 | Status: DISCONTINUED | OUTPATIENT
Start: 2018-09-19 | End: 2018-09-20

## 2018-09-19 RX ORDER — GLUCAGON INJECTION, SOLUTION 0.5 MG/.1ML
1 INJECTION, SOLUTION SUBCUTANEOUS ONCE
Qty: 0 | Refills: 0 | Status: DISCONTINUED | OUTPATIENT
Start: 2018-09-19 | End: 2018-09-19

## 2018-09-19 RX ORDER — METOPROLOL TARTRATE 50 MG
5 TABLET ORAL EVERY 6 HOURS
Qty: 0 | Refills: 0 | Status: DISCONTINUED | OUTPATIENT
Start: 2018-09-19 | End: 2018-09-20

## 2018-09-19 RX ORDER — DEXTROSE 50 % IN WATER 50 %
12.5 SYRINGE (ML) INTRAVENOUS ONCE
Qty: 0 | Refills: 0 | Status: DISCONTINUED | OUTPATIENT
Start: 2018-09-19 | End: 2018-09-19

## 2018-09-19 RX ORDER — VANCOMYCIN HCL 1 G
1750 VIAL (EA) INTRAVENOUS ONCE
Qty: 0 | Refills: 0 | Status: DISCONTINUED | OUTPATIENT
Start: 2018-09-19 | End: 2018-09-20

## 2018-09-19 RX ADMIN — HEPARIN SODIUM 5000 UNIT(S): 5000 INJECTION INTRAVENOUS; SUBCUTANEOUS at 08:54

## 2018-09-19 RX ADMIN — SODIUM CHLORIDE 150 MILLILITER(S): 9 INJECTION, SOLUTION INTRAVENOUS at 13:56

## 2018-09-19 RX ADMIN — Medication 100 MILLIEQUIVALENT(S): at 16:15

## 2018-09-19 RX ADMIN — Medication 100 MILLIEQUIVALENT(S): at 16:10

## 2018-09-19 RX ADMIN — Medication 0.2 MILLILITER(S): at 09:10

## 2018-09-19 RX ADMIN — HEPARIN SODIUM 5000 UNIT(S): 5000 INJECTION INTRAVENOUS; SUBCUTANEOUS at 23:03

## 2018-09-19 RX ADMIN — Medication 400 MILLIGRAM(S): at 16:55

## 2018-09-19 RX ADMIN — Medication 2: at 18:42

## 2018-09-19 RX ADMIN — HEPARIN SODIUM 5000 UNIT(S): 5000 INJECTION INTRAVENOUS; SUBCUTANEOUS at 14:30

## 2018-09-19 RX ADMIN — ONDANSETRON 4 MILLIGRAM(S): 8 TABLET, FILM COATED ORAL at 17:41

## 2018-09-19 RX ADMIN — Medication 300 MILLIGRAM(S): at 23:03

## 2018-09-19 RX ADMIN — Medication 400 MILLIGRAM(S): at 23:03

## 2018-09-19 RX ADMIN — Medication 0.12 MILLIGRAM(S): at 23:03

## 2018-09-19 RX ADMIN — FENTANYL CITRATE 25 MICROGRAM(S): 50 INJECTION INTRAVENOUS at 13:56

## 2018-09-19 RX ADMIN — FENTANYL CITRATE 25 MICROGRAM(S): 50 INJECTION INTRAVENOUS at 13:50

## 2018-09-19 RX ADMIN — Medication 1000 MILLIGRAM(S): at 17:09

## 2018-09-19 RX ADMIN — ONDANSETRON 4 MILLIGRAM(S): 8 TABLET, FILM COATED ORAL at 23:03

## 2018-09-19 RX ADMIN — Medication 0.12 MILLIGRAM(S): at 17:40

## 2018-09-19 RX ADMIN — PANTOPRAZOLE SODIUM 40 MILLIGRAM(S): 20 TABLET, DELAYED RELEASE ORAL at 15:19

## 2018-09-19 RX ADMIN — SODIUM CHLORIDE 3 MILLILITER(S): 9 INJECTION INTRAMUSCULAR; INTRAVENOUS; SUBCUTANEOUS at 09:10

## 2018-09-19 RX ADMIN — Medication 30 MILLIGRAM(S): at 23:03

## 2018-09-19 RX ADMIN — Medication 30 MILLIGRAM(S): at 18:10

## 2018-09-19 RX ADMIN — Medication 5 MILLIGRAM(S): at 17:51

## 2018-09-19 RX ADMIN — Medication 5 MILLIGRAM(S): at 23:03

## 2018-09-19 RX ADMIN — Medication 1000 MILLIGRAM(S): at 12:00

## 2018-09-19 RX ADMIN — Medication 30 MILLIGRAM(S): at 17:51

## 2018-09-19 NOTE — CHART NOTE - NSCHARTNOTEFT_GEN_A_CORE
POST-OP NOTE:    Procedure: lap sleeve gastrectomy and hiatal hernia repair    Subjective: Doing well. Postoperatively Butt was d/'ed, pt voided; ambulates, tolerated sips well. Denies N/V.  Incision used for the insufflation needle continues to bleed.     Vital Signs Last 24 Hrs  T(C): 36.8 (19 Sep 2018 18:56), Max: 37 (19 Sep 2018 09:04)  T(F): 98.2 (19 Sep 2018 18:56), Max: 98.6 (19 Sep 2018 09:04)  HR: 68 (19 Sep 2018 18:56) (54 - 70)  BP: 161/90 (19 Sep 2018 18:56) (112/72 - 169/85)  BP(mean): 108 (19 Sep 2018 16:00) (87 - 112)  RR: 18 (19 Sep 2018 18:56) (14 - 20)  SpO2: 98% (19 Sep 2018 18:56) (96% - 100%)  I&O's Summary    19 Sep 2018 07:01  -  19 Sep 2018 20:59  --------------------------------------------------------  IN: 1200 mL / OUT: 500 mL / NET: 700 mL                            12.5   11.8  )-----------( 339      ( 19 Sep 2018 13:37 )             39.2     09-19    135  |  96  |  12  ----------------------------<  230<H>  3.6   |  23  |  0.85    Ca    9.1      19 Sep 2018 13:37  Phos  3.1     09-19  Mg     1.7     09-19         PHYSICAL EXAM:  Gen: NAD  Pulm: unlabored breathing on RA  CV: regular rhythm   Abd: soft. appropriately distended, port incisions are CDI, need incision continues to bleed.       A&P: 52F s/p lap sleeve gastrectomy and hiatal hernia repair    - Diet per protocol  - Nausea control  - Pain control  - Ambulation  - For bleeding incision put a few stitches; bleeding stopped. There was no evidence of expanding hematoma, most likely cutaneous bleeder.       Green [phone]

## 2018-09-19 NOTE — CHART NOTE - NSCHARTNOTEFT_GEN_A_CORE
Paged by RN due to oozing dressing. Patient seen and examined. Patient stable denies any complaints. Patient denies N/V. Denies CP/SOB/ Palpitations. Patient denies pain. Patient resting comfortable.     Vitals:   ICU Vital Signs Last 24 Hrs  T(C): 36.8 (19 Sep 2018 13:05), Max: 37 (19 Sep 2018 09:04)  T(F): 98.2 (19 Sep 2018 13:05), Max: 98.6 (19 Sep 2018 09:04)  HR: 58 (19 Sep 2018 15:30) (57 - 70)  BP: 149/81 (19 Sep 2018 15:30) (112/72 - 165/80)  BP(mean): 106 (19 Sep 2018 15:30) (87 - 112)  RR: 16 (19 Sep 2018 15:30) (14 - 20)  SpO2: 98% (19 Sep 2018 15:30) (96% - 100%)    Labs:                         12.5   11.8  )-----------( 339      ( 19 Sep 2018 13:37 )             39.2   09-19    135  |  96  |  12  ----------------------------<  230<H>  3.6   |  23  |  0.85    Ca    9.1      19 Sep 2018 13:37  Phos  3.1     09-19  Mg     1.7     09-19      Physical Exam:   Gen: NAD, AAOx3  Abd: soft, NT, ND  Clean/dry/intact  Center OP site: saturated and oozing with clots. Steri-stripes placed to reinforce site. The bandage placed left clean dry and intact   No hematoma, no edema.     52 year old female s/p hernia repair   -DVT PPX  -Pain control   -OOB as tolerated   -Will discuss with chief resident, patient should be checked and monitored again this evening.       Yulissa Archuleta PA-C  #[id number]

## 2018-09-19 NOTE — BRIEF OPERATIVE NOTE - PROCEDURE
<<-----Click on this checkbox to enter Procedure Hiatal hernia repair  09/19/2018    Active  [id number]  Gastrectomy, sleeve, laparoscopic, with laparoscopic hiatal hernia repair  09/19/2018    Active  [email or web address]

## 2018-09-19 NOTE — BRIEF OPERATIVE NOTE - POST-OP DX
Hiatal hernia  09/19/2018    Active  Aaron Wills  Morbid obesity with BMI of 40.0-44.9, adult  09/19/2018    Active  Aaron Wills

## 2018-09-20 ENCOUNTER — TRANSCRIPTION ENCOUNTER (OUTPATIENT)
Age: 53
End: 2018-09-20

## 2018-09-20 VITALS — WEIGHT: 133.82 LBS

## 2018-09-20 LAB
ANION GAP SERPL CALC-SCNC: 15 MMOL/L — SIGNIFICANT CHANGE UP (ref 5–17)
BASOPHILS # BLD AUTO: 0 K/UL — SIGNIFICANT CHANGE UP (ref 0–0.2)
BASOPHILS NFR BLD AUTO: 0.1 % — SIGNIFICANT CHANGE UP (ref 0–2)
BUN SERPL-MCNC: 7 MG/DL — SIGNIFICANT CHANGE UP (ref 7–23)
CALCIUM SERPL-MCNC: 9.2 MG/DL — SIGNIFICANT CHANGE UP (ref 8.4–10.5)
CHLORIDE SERPL-SCNC: 98 MMOL/L — SIGNIFICANT CHANGE UP (ref 96–108)
CO2 SERPL-SCNC: 22 MMOL/L — SIGNIFICANT CHANGE UP (ref 22–31)
CREAT SERPL-MCNC: 0.73 MG/DL — SIGNIFICANT CHANGE UP (ref 0.5–1.3)
EOSINOPHIL # BLD AUTO: 0 K/UL — SIGNIFICANT CHANGE UP (ref 0–0.5)
EOSINOPHIL NFR BLD AUTO: 0.1 % — SIGNIFICANT CHANGE UP (ref 0–6)
GLUCOSE BLDC GLUCOMTR-MCNC: 200 MG/DL — HIGH (ref 70–99)
GLUCOSE SERPL-MCNC: 194 MG/DL — HIGH (ref 70–99)
HCT VFR BLD CALC: 37.4 % — SIGNIFICANT CHANGE UP (ref 34.5–45)
HGB BLD-MCNC: 12.3 G/DL — SIGNIFICANT CHANGE UP (ref 11.5–15.5)
LYMPHOCYTES # BLD AUTO: 1.1 K/UL — SIGNIFICANT CHANGE UP (ref 1–3.3)
LYMPHOCYTES # BLD AUTO: 11 % — LOW (ref 13–44)
MCHC RBC-ENTMCNC: 25 PG — LOW (ref 27–34)
MCHC RBC-ENTMCNC: 33 GM/DL — SIGNIFICANT CHANGE UP (ref 32–36)
MCV RBC AUTO: 75.9 FL — LOW (ref 80–100)
MONOCYTES # BLD AUTO: 0.8 K/UL — SIGNIFICANT CHANGE UP (ref 0–0.9)
MONOCYTES NFR BLD AUTO: 7.8 % — SIGNIFICANT CHANGE UP (ref 2–14)
NEUTROPHILS # BLD AUTO: 8 K/UL — HIGH (ref 1.8–7.4)
NEUTROPHILS NFR BLD AUTO: 80.9 % — HIGH (ref 43–77)
PLATELET # BLD AUTO: 398 K/UL — SIGNIFICANT CHANGE UP (ref 150–400)
POTASSIUM SERPL-MCNC: 3.5 MMOL/L — SIGNIFICANT CHANGE UP (ref 3.5–5.3)
POTASSIUM SERPL-SCNC: 3.5 MMOL/L — SIGNIFICANT CHANGE UP (ref 3.5–5.3)
RBC # BLD: 4.93 M/UL — SIGNIFICANT CHANGE UP (ref 3.8–5.2)
RBC # FLD: 14.3 % — SIGNIFICANT CHANGE UP (ref 10.3–14.5)
SODIUM SERPL-SCNC: 135 MMOL/L — SIGNIFICANT CHANGE UP (ref 135–145)
WBC # BLD: 9.8 K/UL — SIGNIFICANT CHANGE UP (ref 3.8–10.5)
WBC # FLD AUTO: 9.8 K/UL — SIGNIFICANT CHANGE UP (ref 3.8–10.5)

## 2018-09-20 PROCEDURE — S2900: CPT

## 2018-09-20 PROCEDURE — 86900 BLOOD TYPING SEROLOGIC ABO: CPT

## 2018-09-20 PROCEDURE — C1889: CPT

## 2018-09-20 PROCEDURE — 83735 ASSAY OF MAGNESIUM: CPT

## 2018-09-20 PROCEDURE — 82962 GLUCOSE BLOOD TEST: CPT

## 2018-09-20 PROCEDURE — 86901 BLOOD TYPING SEROLOGIC RH(D): CPT

## 2018-09-20 PROCEDURE — 80048 BASIC METABOLIC PNL TOTAL CA: CPT

## 2018-09-20 PROCEDURE — 85027 COMPLETE CBC AUTOMATED: CPT

## 2018-09-20 PROCEDURE — 88305 TISSUE EXAM BY PATHOLOGIST: CPT

## 2018-09-20 PROCEDURE — 84100 ASSAY OF PHOSPHORUS: CPT

## 2018-09-20 PROCEDURE — 81025 URINE PREGNANCY TEST: CPT

## 2018-09-20 RX ORDER — LANSOPRAZOLE 15 MG/1
1 CAPSULE, DELAYED RELEASE ORAL
Qty: 30 | Refills: 0
Start: 2018-09-20 | End: 2018-10-19

## 2018-09-20 RX ORDER — ALBIGLUTIDE 30 MG/.5ML
30 INJECTION, POWDER, LYOPHILIZED, FOR SOLUTION SUBCUTANEOUS
Qty: 0 | Refills: 0 | COMMUNITY

## 2018-09-20 RX ORDER — CLOPIDOGREL BISULFATE 75 MG/1
150 TABLET, FILM COATED ORAL
Qty: 0 | Refills: 0 | COMMUNITY

## 2018-09-20 RX ORDER — INSULIN GLARGINE 100 [IU]/ML
50 INJECTION, SOLUTION SUBCUTANEOUS
Qty: 0 | Refills: 0 | COMMUNITY

## 2018-09-20 RX ORDER — METFORMIN HYDROCHLORIDE 850 MG/1
1 TABLET ORAL
Qty: 0 | Refills: 0 | COMMUNITY

## 2018-09-20 RX ORDER — HYOSCYAMINE SULFATE 0.13 MG
1 TABLET ORAL
Qty: 28 | Refills: 0
Start: 2018-09-20 | End: 2018-09-26

## 2018-09-20 RX ORDER — ONDANSETRON 8 MG/1
1 TABLET, FILM COATED ORAL
Qty: 21 | Refills: 0
Start: 2018-09-20 | End: 2018-09-26

## 2018-09-20 RX ORDER — OXYCODONE HYDROCHLORIDE 5 MG/1
5 TABLET ORAL
Qty: 120 | Refills: 0
Start: 2018-09-20 | End: 2018-09-23

## 2018-09-20 RX ADMIN — Medication 5 MILLIGRAM(S): at 11:49

## 2018-09-20 RX ADMIN — Medication 400 MILLIGRAM(S): at 05:09

## 2018-09-20 RX ADMIN — Medication 5 MILLIGRAM(S): at 05:09

## 2018-09-20 RX ADMIN — Medication 30 MILLIGRAM(S): at 00:00

## 2018-09-20 RX ADMIN — HEPARIN SODIUM 5000 UNIT(S): 5000 INJECTION INTRAVENOUS; SUBCUTANEOUS at 05:09

## 2018-09-20 RX ADMIN — ONDANSETRON 4 MILLIGRAM(S): 8 TABLET, FILM COATED ORAL at 05:09

## 2018-09-20 RX ADMIN — Medication 0.12 MILLIGRAM(S): at 11:49

## 2018-09-20 RX ADMIN — Medication 0.12 MILLIGRAM(S): at 05:09

## 2018-09-20 RX ADMIN — Medication 2: at 08:08

## 2018-09-20 RX ADMIN — Medication 1000 MILLIGRAM(S): at 05:27

## 2018-09-20 NOTE — DISCHARGE NOTE ADULT - INSTRUCTIONS
Bariatric clear diet today, then bariatric full liquid diet for 2-4 weeks, Your surgeon will advise you when to change diet. No carbonated beverages, follow up with your dietician in 30 days

## 2018-09-20 NOTE — DISCHARGE NOTE ADULT - PRINCIPAL DIAGNOSIS
Class 2 severe obesity due to excess calories with serious comorbidity and body mass index (BMI) of 39.0 to 39.9 in adult

## 2018-09-20 NOTE — DIETITIAN INITIAL EVALUATION ADULT. - ADHERENCE
Pt reports following a full liquid diet for 2 weeks PTA as instructed by outpatient RD. Pt reports having Premier Protein, Ensure Max, Jello, popsicles and broth PTA. Confirms NKFA. Was taking multivitamin PTA.

## 2018-09-20 NOTE — DIETITIAN INITIAL EVALUATION ADULT. - OTHER INFO
Pt seen for bariatric surgery consult on 2MON. Pt with multiple previous wt loss attempts per chart. Pt tried weight watchers and weight loss pills and was unable to lose and maintain significant wt loss. Per chart, pt met with outpatient RD multiple time, during most recent visit pt weighed 249.6 pounds (7/6/18). Pre-surgical wt (H&P) noted as 244.9 pounds (9/7/18). Current wt of 237 pounds (9/19/2018). Pt now S/P laparoscopic vertical sleeve gastrectomy. Pt denies N+V, sipping on bariatric clear liquids during RD visit. Pt with knowledge of bariatric full liquid diet and receptive to in depth review/reinforcement. Pt reports home stock of protein shakes with plans to purchase more. Pt reports she plans to purchase the necessary vitamins/minerals in chewable/liquid/crushable form including a multivitamin with added elemental iron, calcium citrate with vitamin D, and sublingual B12. Pt was advised to take the multivitamin with elemental iron at least 2 hours apart from the calcium citrate with vitamin D for optimal absorption. Pt plans to schedule a follow up appointment with outpatient RD. Pt able to teach back all points discussed during interview.

## 2018-09-20 NOTE — DISCHARGE NOTE ADULT - PATIENT PORTAL LINK FT
You can access the [email or web address] Patient Portal, offered by [facility], by registering with the following website: [email or web address]

## 2018-09-20 NOTE — DISCHARGE NOTE ADULT - PLAN OF CARE
Pt will engage in healthy lifestyle changes for improve quality of life Behavioral health and nutritional counseling  Dietary supplement  Physical activity  Follow care

## 2018-09-20 NOTE — DISCHARGE NOTE ADULT - MEDICATION SUMMARY - MEDICATIONS TO STOP TAKING
I will STOP taking the medications listed below when I get home from the hospital:    NIFEdipine 60 mg oral tablet, extended release  -- 1 tab(s) by mouth once a day - hold after surgery    Tanzeum 30 mg subcutaneous injection  -- subcutaneous once a week-last dose 09/02/18 as per endocrinologist    HumaLOG 100 units/mL injectable solution  -- 10 unit(s) injectable 3 times a day - held while on liquid diet    Basaglar KwikPen 100 units/mL subcutaneous solution  -- 50 unit(s) subcutaneous once a day, last dose 09/04/18 as per edndocrine    metFORMIN 750 mg oral tablet, extended release  -- 1 tab(s) by mouth 2 times a day - hold after surgery

## 2018-09-20 NOTE — DISCHARGE NOTE ADULT - HOSPITAL COURSE
52 year old obese female with a PMHx of HTN, HLD, DM2, glaucoma, sarcoidosis, chronic back pain secondary to herniated discs in the cervical and lumbar spine, stroke(embolism Rt MCA 10/03/17) presents September 19, 2018 and underwent a Laparoscopic Robotic Sleeve Gastrectomy for morbid obesity BMI 37 and Hiatal Hernia Repair. She received DVT and SSI prophylaxis prior to start of surgery. Patient tolerated the procedure well and was extubated and sent to PACU in stable condition. Once hemodynamically stable and has effective pain control, she ambulated with assistance and voided within 4 hours after procedure. She was later transferred to a bariatric unit and placed on bedside continuous pulse oximetry.  Her pain was controlled by IV pain medications and then was transitioned to liquid oral pain medications. She was started on bariatric clear liquid diet the evening of surgery.    On POD #1 she remained stable with no acute events overnight. She continued to tolerate increase oral intake, denies nausea and vomiting. The rest of her hospital course was uneventful and she was clear for discharge home on POD#1. Patient is ambulating independently, voiding, tolerating a diet, and has effective pain control.  She will follow a protocol-derived staged meal progression supervised by her dietician in the outpatient setting. Patient will follow-up with Dr. Fuller in 1-2 weeks.  She was advised to resume Plavix on Saturday and follow-up with her primary care doctor and cardiologist in 1-2 weeks as well as her nutritionist in 30 days.  All appropriate prescriptions obtained from [facility] prior to discharge home. Written discharge instruction explained and given to include VTE prevention.

## 2018-09-20 NOTE — PROGRESS NOTE ADULT - SUBJECTIVE AND OBJECTIVE BOX
Post Op Day#: 1    Subjective: " I am doing well, no complaints, I walked"    Objective: No acute events overnight. Continuous pulse oximetry at bedside functioning,  v/s stable, afebrile. Labs pending. Pt OOB and ambulated independently around the unit last night.  Started bariatric clear liquid diet and tolerating it slowly since yesterday.  Voiding adequately                                                Vital Signs Last 24 Hrs  T(C): 37.2 (20 Sep 2018 05:08), Max: 37.2 (20 Sep 2018 05:08)  T(F): 98.9 (20 Sep 2018 05:08), Max: 98.9 (20 Sep 2018 05:08)  HR: 89 (20 Sep 2018 05:08) (54 - 89)  BP: 146/82 (20 Sep 2018 05:08) (112/72 - 172/92)  BP(mean): 108 (19 Sep 2018 16:00) (87 - 112)  RR: 18 (20 Sep 2018 05:08) (14 - 20)  SpO2: 98% (20 Sep 2018 05:08) (96% - 100%)                                               I&O's Summary    19 Sep 2018 07:01  -  20 Sep 2018 07:00  --------------------------------------------------------  IN: 3700 mL / OUT: 2700 mL / NET: 1000 mL                                                                          12.5   11.8  )-----------( 339      ( 19 Sep 2018 13:37 )             39.2                                                 09-19    135  |  96  |  12  ----------------------------<  230<H>  3.6   |  23  |  0.85    Ca    9.1      19 Sep 2018 13:37  Phos  3.1     09-19  Mg     1.7     09-19      dextrose 40% Gel 15 Gram(s) Oral once PRN  dextrose 5%. 1000 milliLiter(s) IV Continuous <Continuous>  dextrose 50% Injectable 12.5 Gram(s) IV Push once  dextrose 50% Injectable 25 Gram(s) IV Push once  dextrose 50% Injectable 25 Gram(s) IV Push once  glucagon  Injectable 1 milliGRAM(s) IntraMuscular once PRN  heparin  Injectable 5000 Unit(s) SubCutaneous every 8 hours  hyoscyamine SL 0.125 milliGRAM(s) SubLingual every 6 hours  insulin lispro (HumaLOG) corrective regimen sliding scale   SubCutaneous three times a day before meals  insulin lispro (HumaLOG) corrective regimen sliding scale   SubCutaneous at bedtime  lactated ringers. 1000 milliLiter(s) IV Continuous <Continuous>  lidocaine 0.5% Injectable 5 milliLiter(s) Local Injection once  metoprolol tartrate Injectable 5 milliGRAM(s) IV Push every 6 hours  multivitamin/thiamine/folic acid in sodium chloride 0.9% 1000 milliLiter(s) IV Continuous <Continuous>  ondansetron Injectable 4 milliGRAM(s) IV Push every 6 hours  pantoprazole  Injectable 40 milliGRAM(s) IV Push every 24 hours      Physical Exam:         Lungs:  clear breath sounds b/l       Heart:  Regular rate & rhythm       Abdomen:  Soft, non-distended.  Scopes sites clean, dry and intact. + bs, - flatus, no rebound or guarding       Skin:  intact, pannus w/o rash       Extremities: + pulses, no edema, no calf tenderness, negative rosalinda's     VTE Risk Assessment Scores             Caprini VTE Risk Score:                                                               =5 (High ), Perioperative and Postoperative VTE prophylaxis   [facility]  VTE Predicted RISK SCORE:  < 1% ( Low  ) No extended postoperative VTE prophylaxis      Assessment and Plan: S/P lap robotic Sleeve Gastrectomy    - Bariatric Clear diet today then protocol derived staged meal progression supervised by RD in outpatient setting  - DVT/GI prophylaxis, Incentive spirometry  - Ambulate Q 2 hours or as indicated  -  Procedure specific education including diet, vitamins and VTE prevention. Written materials given  - Medication reviewed and reconciled, Bariatric meds obtained from [facility] prior to d/c  -  D/C home once Bariatric 8-Point d/c criteria met  -  Follow up with Dr Casanova in 7-10 days, Dietitian and PMD in 30 days.      Ronit Garcia, ANGELA, ANP  [phone]

## 2018-09-20 NOTE — PROVIDER CONTACT NOTE (OTHER) - ASSESSMENT
pt /92. All other VSS. Pt denies chest pain or shortness of breath. No signs of distress. Pt runs in high 160's systolic since being admitted to the floor.

## 2018-09-20 NOTE — PROGRESS NOTE ADULT - ASSESSMENT
A&P: 52F s/p lap sleeve gastrectomy and hiatal hernia repair    - Bariatric CLD  - Nausea control  - Pain control  - F/u am labs   - Ambulation/DVT ppx  - Social work consult cynthia Olmstead [id number]

## 2018-09-20 NOTE — PROGRESS NOTE ADULT - SUBJECTIVE AND OBJECTIVE BOX
SURGERY DAILY PROGRESS NOTE:       SUBJECTIVE/ROS: Patient examined at bedside. Tolerated CLD w/o N/V. Ambulates. Voids. Pain well controlled.          MEDICATIONS  (STANDING):  dextrose 5%. 1000 milliLiter(s) (50 mL/Hr) IV Continuous <Continuous>  dextrose 50% Injectable 12.5 Gram(s) IV Push once  dextrose 50% Injectable 25 Gram(s) IV Push once  dextrose 50% Injectable 25 Gram(s) IV Push once  heparin  Injectable 5000 Unit(s) SubCutaneous every 8 hours  hyoscyamine SL 0.125 milliGRAM(s) SubLingual every 6 hours  insulin lispro (HumaLOG) corrective regimen sliding scale   SubCutaneous three times a day before meals  insulin lispro (HumaLOG) corrective regimen sliding scale   SubCutaneous at bedtime  lactated ringers. 1000 milliLiter(s) (150 mL/Hr) IV Continuous <Continuous>  lidocaine 0.5% Injectable 5 milliLiter(s) Local Injection once  metoprolol tartrate Injectable 5 milliGRAM(s) IV Push every 6 hours  multivitamin/thiamine/folic acid in sodium chloride 0.9% 1000 milliLiter(s) (250 mL/Hr) IV Continuous <Continuous>  ondansetron Injectable 4 milliGRAM(s) IV Push every 6 hours  pantoprazole  Injectable 40 milliGRAM(s) IV Push every 24 hours    MEDICATIONS  (PRN):  dextrose 40% Gel 15 Gram(s) Oral once PRN Blood Glucose LESS THAN 70 milliGRAM(s)/deciliter  glucagon  Injectable 1 milliGRAM(s) IntraMuscular once PRN Glucose LESS THAN 70 milligrams/deciliter      OBJECTIVE:    Vital Signs Last 24 Hrs  T(C): 37.2 (20 Sep 2018 05:08), Max: 37.2 (20 Sep 2018 05:08)  T(F): 98.9 (20 Sep 2018 05:08), Max: 98.9 (20 Sep 2018 05:08)  HR: 89 (20 Sep 2018 05:08) (54 - 89)  BP: 146/82 (20 Sep 2018 05:08) (112/72 - 172/92)  BP(mean): 108 (19 Sep 2018 16:00) (87 - 112)  RR: 18 (20 Sep 2018 05:08) (14 - 20)  SpO2: 98% (20 Sep 2018 05:08) (96% - 100%)        I&O's Detail    19 Sep 2018 07:01  -  20 Sep 2018 05:20  --------------------------------------------------------  IN:    IV PiggyBack: 450 mL    lactated ringers.: 1350 mL  Total IN: 1800 mL    OUT:    Voided: 2700 mL  Total OUT: 2700 mL    Total NET: -900 mL          Daily Height in cm: 170.18 (19 Sep 2018 09:04)    Daily     LABS:                        12.5   11.8  )-----------( 339      ( 19 Sep 2018 13:37 )             39.2     09-19    135  |  96  |  12  ----------------------------<  230<H>  3.6   |  23  |  0.85    Ca    9.1      19 Sep 2018 13:37  Phos  3.1     09-19  Mg     1.7     09-19                    PHYSICAL EXAM:  GEN: NAD  Pulm: unlabored breathing on RA  CV: radial pulse 2+, cap refil <2sec  Abd: soft, nontender, nondistended incision CDI

## 2018-09-20 NOTE — DISCHARGE NOTE ADULT - MEDICATION SUMMARY - MEDICATIONS TO TAKE
I will START or STAY ON the medications listed below when I get home from the hospital:    oxyCODONE 5 mg/5 mL oral solution  -- 5 milliliter(s) by mouth every 4 hours, As Needed MDD:30   -- Caution federal law prohibits the transfer of this drug to any person other  than the person for whom it was prescribed.  May cause drowsiness.  Alcohol may intensify this effect.  Use care when operating dangerous machinery.  This prescription cannot be refilled.  Using more of this medication than prescribed may cause serious breathing problems.    -- Indication: For pain    losartan 100 mg oral tablet  -- 1 tab(s) by mouth once a day - crush  -- Indication: For hypertension    HumaLOG 100 units/mL injectable solution  -- 10 unit(s) injectable 3 times a day - held while on liquid diet  -- Indication: For diabetes    Zofran ODT 4 mg oral tablet, disintegrating  -- 1 tab(s) by mouth 3 times a day, As Needed MDD:3   -- Indication: For nausea    atorvastatin 80 mg oral tablet  -- 1 tab(s) by mouth once a day (at bedtime) - crush  -- Indication: For hyperlipidemia    clopidogrel  -- 150 milligram(s) by mouth once a day - crush. Resuem this Saturday 9/22/18,. Stop immediately for acute bleeding and notify your doctor  -- Indication: For Stroke    metoprolol tartrate 50 mg oral tablet  -- 1 tab(s) by mouth 2 times a day - crush  -- Indication: For hypertension    NIFEdipine 60 mg oral tablet, extended release  -- 1 tab(s) by mouth once a day - hold after surgery  -- Indication: For hypertension - hold for now as per prescriber    Levsin SL 0.125 mg sublingual tablet  -- 1 tab(s) under tongue every 6 hours, As Needed gastric spasm  -- Indication: For gastric spasm    latanoprost 0.005% ophthalmic solution  -- 1 drop(s) to each affected eye once a day (in the evening)  -- Indication: For eye treatment    lansoprazole 30 mg oral delayed release capsule  -- 1 cap(s) by mouth once a day  open and mix in applesauce  -- It is very important that you take or use this exactly as directed.  Do not skip doses or discontinue unless directed by your doctor.  Obtain medical advice before taking any non-prescription drugs as some may affect the action of this medication.  Swallow whole.  Do not crush.  Take medication on an empty stomach 1 hour before or 2 to 3 hours after a meal unless otherwise directed by your doctor.    -- Indication: For reflux    Vitamin D2 50,000 intl units (1.25 mg) oral capsule  -- 1 cap(s) by mouth once a week  -- Indication: For dietary supplement    Vitamin B12 500 mcg oral tablet  -- 1 tab(s) by mouth once a day  -- Indication: For dietary supplement

## 2018-09-21 ENCOUNTER — APPOINTMENT (OUTPATIENT)
Dept: SURGERY | Facility: CLINIC | Age: 53
End: 2018-09-21

## 2018-09-21 LAB — SURGICAL PATHOLOGY STUDY: SIGNIFICANT CHANGE UP

## 2018-09-25 ENCOUNTER — APPOINTMENT (OUTPATIENT)
Dept: ELECTROPHYSIOLOGY | Facility: CLINIC | Age: 53
End: 2018-09-25
Payer: MEDICAID

## 2018-09-25 PROCEDURE — 93298 REM INTERROG DEV EVAL SCRMS: CPT

## 2018-09-25 PROCEDURE — 93299: CPT

## 2018-09-27 ENCOUNTER — OTHER (OUTPATIENT)
Age: 53
End: 2018-09-27

## 2018-09-28 ENCOUNTER — APPOINTMENT (OUTPATIENT)
Dept: SURGERY | Facility: CLINIC | Age: 53
End: 2018-09-28
Payer: MEDICAID

## 2018-09-28 VITALS
BODY MASS INDEX: 38.44 KG/M2 | HEART RATE: 73 BPM | DIASTOLIC BLOOD PRESSURE: 92 MMHG | RESPIRATION RATE: 15 BRPM | TEMPERATURE: 98.3 F | SYSTOLIC BLOOD PRESSURE: 162 MMHG | WEIGHT: 231 LBS | OXYGEN SATURATION: 97 %

## 2018-09-28 DIAGNOSIS — Z09 ENCOUNTER FOR FOLLOW-UP EXAMINATION AFTER COMPLETED TREATMENT FOR CONDITIONS OTHER THAN MALIGNANT NEOPLASM: ICD-10-CM

## 2018-09-28 PROCEDURE — 99024 POSTOP FOLLOW-UP VISIT: CPT

## 2018-09-28 RX ORDER — ALBIGLUTIDE 30 MG/.5ML
30 INJECTION, POWDER, LYOPHILIZED, FOR SOLUTION SUBCUTANEOUS
Qty: 3 | Refills: 2 | Status: DISCONTINUED | COMMUNITY
Start: 2018-02-28 | End: 2018-09-28

## 2018-10-07 ENCOUNTER — FORM ENCOUNTER (OUTPATIENT)
Age: 53
End: 2018-10-07

## 2018-10-08 ENCOUNTER — APPOINTMENT (OUTPATIENT)
Dept: MRI IMAGING | Facility: HOSPITAL | Age: 53
End: 2018-10-08

## 2018-10-08 ENCOUNTER — OUTPATIENT (OUTPATIENT)
Dept: OUTPATIENT SERVICES | Facility: HOSPITAL | Age: 53
LOS: 1 days | End: 2018-10-08
Payer: MEDICAID

## 2018-10-08 DIAGNOSIS — Z95.818 PRESENCE OF OTHER CARDIAC IMPLANTS AND GRAFTS: Chronic | ICD-10-CM

## 2018-10-08 DIAGNOSIS — Z98.89 OTHER SPECIFIED POSTPROCEDURAL STATES: Chronic | ICD-10-CM

## 2018-10-08 DIAGNOSIS — I63.411 CEREBRAL INFARCTION DUE TO EMBOLISM OF RIGHT MIDDLE CEREBRAL ARTERY: ICD-10-CM

## 2018-10-08 PROCEDURE — 70544 MR ANGIOGRAPHY HEAD W/O DYE: CPT

## 2018-10-08 PROCEDURE — 70547 MR ANGIOGRAPHY NECK W/O DYE: CPT

## 2018-10-08 PROCEDURE — 70544 MR ANGIOGRAPHY HEAD W/O DYE: CPT | Mod: 26

## 2018-10-08 PROCEDURE — 70547 MR ANGIOGRAPHY NECK W/O DYE: CPT | Mod: 26

## 2018-10-26 ENCOUNTER — APPOINTMENT (OUTPATIENT)
Dept: ELECTROPHYSIOLOGY | Facility: CLINIC | Age: 53
End: 2018-10-26
Payer: MEDICAID

## 2018-10-26 PROCEDURE — 93298 REM INTERROG DEV EVAL SCRMS: CPT

## 2018-10-26 PROCEDURE — 93299: CPT

## 2018-11-05 ENCOUNTER — APPOINTMENT (OUTPATIENT)
Dept: NEUROLOGY | Facility: CLINIC | Age: 53
End: 2018-11-05
Payer: MEDICAID

## 2018-11-05 VITALS
HEART RATE: 65 BPM | WEIGHT: 224 LBS | DIASTOLIC BLOOD PRESSURE: 83 MMHG | HEIGHT: 65 IN | BODY MASS INDEX: 37.32 KG/M2 | SYSTOLIC BLOOD PRESSURE: 151 MMHG

## 2018-11-05 PROCEDURE — 99215 OFFICE O/P EST HI 40 MIN: CPT

## 2018-11-14 ENCOUNTER — APPOINTMENT (OUTPATIENT)
Dept: ENDOCRINOLOGY | Facility: CLINIC | Age: 53
End: 2018-11-14
Payer: MEDICAID

## 2018-11-14 ENCOUNTER — RESULT CHARGE (OUTPATIENT)
Age: 53
End: 2018-11-14

## 2018-11-14 VITALS — DIASTOLIC BLOOD PRESSURE: 90 MMHG | OXYGEN SATURATION: 97 % | HEART RATE: 85 BPM | SYSTOLIC BLOOD PRESSURE: 142 MMHG

## 2018-11-14 LAB — HBA1C MFR BLD HPLC: 6.9

## 2018-11-14 PROCEDURE — 99214 OFFICE O/P EST MOD 30 MIN: CPT | Mod: 25

## 2018-11-14 PROCEDURE — 76536 US EXAM OF HEAD AND NECK: CPT

## 2018-11-15 LAB — PA ADP PRP-ACNC: 28 PRU

## 2018-11-30 ENCOUNTER — APPOINTMENT (OUTPATIENT)
Dept: SURGERY | Facility: CLINIC | Age: 53
End: 2018-11-30
Payer: MEDICAID

## 2018-11-30 VITALS
TEMPERATURE: 98.1 F | DIASTOLIC BLOOD PRESSURE: 100 MMHG | SYSTOLIC BLOOD PRESSURE: 160 MMHG | HEIGHT: 65 IN | BODY MASS INDEX: 35.99 KG/M2 | HEART RATE: 67 BPM | OXYGEN SATURATION: 100 % | WEIGHT: 216 LBS | RESPIRATION RATE: 16 BRPM

## 2018-11-30 PROCEDURE — 99024 POSTOP FOLLOW-UP VISIT: CPT

## 2018-11-30 RX ORDER — FLASH GLUCOSE SENSOR
KIT MISCELLANEOUS
Qty: 3 | Refills: 3 | Status: DISCONTINUED | COMMUNITY
Start: 2018-03-28 | End: 2018-11-30

## 2018-11-30 RX ORDER — FLASH GLUCOSE SENSOR
KIT MISCELLANEOUS
Qty: 1 | Refills: 0 | Status: DISCONTINUED | COMMUNITY
Start: 2018-03-28 | End: 2018-11-30

## 2018-12-04 ENCOUNTER — APPOINTMENT (OUTPATIENT)
Dept: ELECTROPHYSIOLOGY | Facility: CLINIC | Age: 53
End: 2018-12-04
Payer: MEDICAID

## 2018-12-04 PROCEDURE — 93298 REM INTERROG DEV EVAL SCRMS: CPT

## 2018-12-04 PROCEDURE — 93299: CPT

## 2018-12-11 ENCOUNTER — APPOINTMENT (OUTPATIENT)
Dept: NEUROLOGY | Facility: CLINIC | Age: 53
End: 2018-12-11

## 2019-01-18 ENCOUNTER — APPOINTMENT (OUTPATIENT)
Dept: ELECTROPHYSIOLOGY | Facility: CLINIC | Age: 54
End: 2019-01-18
Payer: MEDICAID

## 2019-01-18 PROCEDURE — 93285 PRGRMG DEV EVAL SCRMS IP: CPT

## 2019-02-05 ENCOUNTER — APPOINTMENT (OUTPATIENT)
Dept: NEUROLOGY | Facility: CLINIC | Age: 54
End: 2019-02-05
Payer: MEDICAID

## 2019-02-05 PROCEDURE — 93880 EXTRACRANIAL BILAT STUDY: CPT

## 2019-02-11 ENCOUNTER — EMERGENCY (EMERGENCY)
Facility: HOSPITAL | Age: 54
LOS: 1 days | Discharge: HOME | End: 2019-02-11
Attending: EMERGENCY MEDICINE | Admitting: EMERGENCY MEDICINE
Payer: MEDICAID

## 2019-02-11 VITALS
HEART RATE: 79 BPM | SYSTOLIC BLOOD PRESSURE: 139 MMHG | DIASTOLIC BLOOD PRESSURE: 61 MMHG | OXYGEN SATURATION: 100 % | RESPIRATION RATE: 18 BRPM

## 2019-02-11 VITALS
TEMPERATURE: 98 F | OXYGEN SATURATION: 100 % | RESPIRATION RATE: 18 BRPM | SYSTOLIC BLOOD PRESSURE: 236 MMHG | HEART RATE: 77 BPM | DIASTOLIC BLOOD PRESSURE: 142 MMHG

## 2019-02-11 DIAGNOSIS — Z98.89 OTHER SPECIFIED POSTPROCEDURAL STATES: Chronic | ICD-10-CM

## 2019-02-11 DIAGNOSIS — Z95.818 PRESENCE OF OTHER CARDIAC IMPLANTS AND GRAFTS: Chronic | ICD-10-CM

## 2019-02-11 LAB
BASOPHILS # BLD AUTO: 0.06 K/UL — SIGNIFICANT CHANGE UP (ref 0–0.2)
BASOPHILS NFR BLD AUTO: 0.7 % — SIGNIFICANT CHANGE UP (ref 0–2)
EOSINOPHIL # BLD AUTO: 0.23 K/UL — SIGNIFICANT CHANGE UP (ref 0–0.5)
EOSINOPHIL NFR BLD AUTO: 2.7 % — SIGNIFICANT CHANGE UP (ref 0–6)
HCT VFR BLD CALC: 42.3 % — SIGNIFICANT CHANGE UP (ref 34.5–45)
HGB BLD-MCNC: 12.7 G/DL — SIGNIFICANT CHANGE UP (ref 11.5–15.5)
IMM GRANULOCYTES NFR BLD AUTO: 0.2 % — SIGNIFICANT CHANGE UP (ref 0–1.5)
LYMPHOCYTES # BLD AUTO: 2.2 K/UL — SIGNIFICANT CHANGE UP (ref 1–3.3)
LYMPHOCYTES # BLD AUTO: 25.9 % — SIGNIFICANT CHANGE UP (ref 13–44)
MCHC RBC-ENTMCNC: 22.9 PG — LOW (ref 27–34)
MCHC RBC-ENTMCNC: 30 % — LOW (ref 32–36)
MCV RBC AUTO: 76.4 FL — LOW (ref 80–100)
MONOCYTES # BLD AUTO: 0.5 K/UL — SIGNIFICANT CHANGE UP (ref 0–0.9)
MONOCYTES NFR BLD AUTO: 5.9 % — SIGNIFICANT CHANGE UP (ref 2–14)
NEUTROPHILS # BLD AUTO: 5.5 K/UL — SIGNIFICANT CHANGE UP (ref 1.8–7.4)
NEUTROPHILS NFR BLD AUTO: 64.6 % — SIGNIFICANT CHANGE UP (ref 43–77)
NRBC # FLD: 0 K/UL — LOW (ref 25–125)
PLATELET # BLD AUTO: 389 K/UL — SIGNIFICANT CHANGE UP (ref 150–400)
PMV BLD: 10.5 FL — SIGNIFICANT CHANGE UP (ref 7–13)
RBC # BLD: 5.54 M/UL — HIGH (ref 3.8–5.2)
RBC # FLD: 14.6 % — HIGH (ref 10.3–14.5)
TROPONIN T, HIGH SENSITIVITY: 11 NG/L — SIGNIFICANT CHANGE UP (ref ?–14)
WBC # BLD: 8.51 K/UL — SIGNIFICANT CHANGE UP (ref 3.8–10.5)
WBC # FLD AUTO: 8.51 K/UL — SIGNIFICANT CHANGE UP (ref 3.8–10.5)

## 2019-02-11 PROCEDURE — 93010 ELECTROCARDIOGRAM REPORT: CPT

## 2019-02-11 PROCEDURE — 99284 EMERGENCY DEPT VISIT MOD MDM: CPT | Mod: 25

## 2019-02-11 PROCEDURE — 73070 X-RAY EXAM OF ELBOW: CPT | Mod: 26,LT

## 2019-02-11 RX ORDER — ACETAMINOPHEN 500 MG
975 TABLET ORAL ONCE
Qty: 0 | Refills: 0 | Status: COMPLETED | OUTPATIENT
Start: 2019-02-11 | End: 2019-02-11

## 2019-02-11 RX ORDER — DIAZEPAM 5 MG
5 TABLET ORAL ONCE
Qty: 0 | Refills: 0 | Status: DISCONTINUED | OUTPATIENT
Start: 2019-02-11 | End: 2019-02-11

## 2019-02-11 RX ORDER — METOPROLOL TARTRATE 50 MG
25 TABLET ORAL ONCE
Qty: 0 | Refills: 0 | Status: COMPLETED | OUTPATIENT
Start: 2019-02-11 | End: 2019-02-11

## 2019-02-11 RX ORDER — IBUPROFEN 200 MG
600 TABLET ORAL ONCE
Qty: 0 | Refills: 0 | Status: COMPLETED | OUTPATIENT
Start: 2019-02-11 | End: 2019-02-11

## 2019-02-11 RX ADMIN — Medication 5 MILLIGRAM(S): at 10:01

## 2019-02-11 RX ADMIN — Medication 25 MILLIGRAM(S): at 12:08

## 2019-02-11 RX ADMIN — Medication 600 MILLIGRAM(S): at 10:01

## 2019-02-11 RX ADMIN — Medication 975 MILLIGRAM(S): at 10:02

## 2019-02-11 RX ADMIN — Medication 0.2 MILLIGRAM(S): at 12:08

## 2019-02-11 NOTE — ED PROVIDER NOTE - NEUROLOGICAL, MLM
Alert and oriented, no focal deficits, CN 2-12 intact; pupils 4 mm and reactive, no nystagmus, symmetrical wrinkling forhead, puffing cheeks, closing eyes, uvula midline, tongue protrudes midline, approp palatal elevation, full strength shrugging shoulders, Upper and lower ext 5/5 muscle strength; sensation intact.
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
## 2019-02-11 NOTE — ED PROVIDER NOTE - CONSTITUTIONAL, MLM
normal... well nourished, awake, alert, oriented to person, place, time/situation and in moderate painful distress
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
## 2019-02-11 NOTE — ED PROVIDER NOTE - MUSCULOSKELETAL, MLM
Spine appears normal, no bony tenderness over cervical to sacrum; range of motion is not limited, Left elbow - diffusely tender, w/o warmth, obvious elbow effusion, crepitus, erythema, streaking, +full ROM of entire upper extremity bilaterally, +tenderness w/ flexion and extension of left elbow; Distal pulses 2+ and symmetric, cap refill < 2 seconds; rest of joint upper ext w/ full ROM, non tender, no signs of infection; Neck movements do not illicit pain.

## 2019-02-11 NOTE — ED ADULT TRIAGE NOTE - CHIEF COMPLAINT QUOTE
Pt states she woke up at 3am with severe left arm pain, states she had pain in her left shoulder "for a while", but now it spread and worsened, has limited ROM due to pain. Pt noted hypertensive in triage, appears in NAD. Didn't take her BP meds this morning. Pt states she woke up at 3am with severe left arm pain, states she had pain in her left shoulder "for a while", but now it spread and worsened, has limited ROM due to pain, denies injury or strenuous activity. Pt noted hypertensive in triage, appears in NAD. Didn't take her BP meds this morning.
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
## 2019-02-11 NOTE — ED PROVIDER NOTE - NSFOLLOWUPINSTRUCTIONS_ED_ALL_ED_FT
Follow up with your primary care doctor in the next 2 days. Return for worsening pain, change in location of pain, if you develop fevers, chills, nausea, vomiting, chest pain, shortness of breath, muscle weakness or numbness/tingling.

## 2019-02-14 ENCOUNTER — APPOINTMENT (OUTPATIENT)
Dept: OPHTHALMOLOGY | Facility: CLINIC | Age: 54
End: 2019-02-14
Payer: MEDICAID

## 2019-02-14 DIAGNOSIS — E13.3411: ICD-10-CM

## 2019-02-14 DIAGNOSIS — E11.3212 TYPE 2 DIABETES MELLITUS WITH MILD NONPROLIFERATIVE DIABETIC RETINOPATHY WITH MACULAR EDEMA, LEFT EYE: ICD-10-CM

## 2019-02-14 PROCEDURE — 92250 FUNDUS PHOTOGRAPHY W/I&R: CPT

## 2019-02-14 PROCEDURE — 92083 EXTENDED VISUAL FIELD XM: CPT

## 2019-02-14 PROCEDURE — ZZZZZ: CPT

## 2019-02-14 PROCEDURE — 92014 COMPRE OPH EXAM EST PT 1/>: CPT

## 2019-02-14 RX ORDER — LATANOPROST/PF 0.005 %
0.01 DROPS OPHTHALMIC (EYE) DAILY
Qty: 1 | Refills: 5 | Status: ACTIVE | COMMUNITY
Start: 2018-08-09 | End: 1900-01-01

## 2019-02-20 ENCOUNTER — APPOINTMENT (OUTPATIENT)
Dept: ELECTROPHYSIOLOGY | Facility: CLINIC | Age: 54
End: 2019-02-20
Payer: MEDICAID

## 2019-02-20 PROCEDURE — 93299: CPT

## 2019-02-20 PROCEDURE — 93298 REM INTERROG DEV EVAL SCRMS: CPT

## 2019-02-26 ENCOUNTER — APPOINTMENT (OUTPATIENT)
Dept: NEUROLOGY | Facility: CLINIC | Age: 54
End: 2019-02-26
Payer: MEDICAID

## 2019-02-26 VITALS
SYSTOLIC BLOOD PRESSURE: 149 MMHG | WEIGHT: 216 LBS | DIASTOLIC BLOOD PRESSURE: 95 MMHG | HEIGHT: 65 IN | BODY MASS INDEX: 35.99 KG/M2 | HEART RATE: 72 BPM

## 2019-02-26 PROCEDURE — 99215 OFFICE O/P EST HI 40 MIN: CPT

## 2019-02-27 ENCOUNTER — APPOINTMENT (OUTPATIENT)
Dept: ENDOCRINOLOGY | Facility: CLINIC | Age: 54
End: 2019-02-27
Payer: MEDICAID

## 2019-02-27 VITALS — SYSTOLIC BLOOD PRESSURE: 142 MMHG | DIASTOLIC BLOOD PRESSURE: 88 MMHG | OXYGEN SATURATION: 98 % | HEART RATE: 76 BPM

## 2019-02-27 LAB
GLUCOSE BLDC GLUCOMTR-MCNC: 135
HBA1C MFR BLD HPLC: 7.3

## 2019-02-27 PROCEDURE — 83036 HEMOGLOBIN GLYCOSYLATED A1C: CPT | Mod: QW

## 2019-02-27 PROCEDURE — 99214 OFFICE O/P EST MOD 30 MIN: CPT | Mod: 25

## 2019-02-27 PROCEDURE — 82962 GLUCOSE BLOOD TEST: CPT

## 2019-02-27 NOTE — ASSESSMENT
[FreeTextEntry1] : Assessment:\par 52 years old right-handed woman with multiple vascular risk factors including age, HTN, DM II and HPLD is seen in the vascular neurology office for evaluation and management of stroke to hospital admission in 10/17. She was reported to have acute onset of left facial droop, dysarthria and troubled with a sense of directions in 10/17, prompting her presentation to [facility] for further evaluation. CT brain (10/17) on admission showed early changes of ischemia in the right MCA distribution. MRI brain (10/17) showed right MCA distribution embolic-looking infarct without significant hemorrhagic transformation. CTA head and neck (10/17) is concerning for possible partial recanalization of an embolus involving distal MCA M1 bifurcation/proximal M2s vs intracranial atherosclerosis to my eye but did not show any other significant intracranial or extracranial cerebral large vessel severe stenosis or occlusion. Transesophageal echocardiogram did not show any obvious structural cardiac source of embolism nor showed any evidence of a PFO. Hypercoagulable workup is unrevealing and no evidence of primary hypercoagulable state as the evaluation by hematologist. She is currently undergoing prolonged cardiac monitoring with ICM. Sleep study did not show any evidence of obstructive sleep apnea. MRA head (1/9/18) is reported to show "high-grade stenosis at right MCA M1 bifurcation/proximal M2 segments, and moderate stenosis of the basilar artery". MRA head and neck with MRA NOVA confirmed right MCA distal M1 severe to critical stenosis.\par \par Impression:\par Cerebral embolism with cerebral infarction. Right MCA distribution embolic-looking stroke - likely etiology being cryptogenic stroke, competing mechanisms being large vessel disease i.e. symptomatic intracranial atherosclerosis, leading to artery to artery embolism versus embolism from a proximal source like cardiac source of embolism, favoring the former \par Clopidogrel non-responder \par \par Plan:\par - Clopidogrel 150 mg once a day (clopidogrel non-responder as her P2Y12 was noted to be >200 while being on clopidogrel not able to get Aggrenox approved due to medical insurance denial and consider aspirin failure) for secondary prevention, indefinitely if not contraindicated due to any specific reasons in the future. Risks, benefits and adverse reactions associated with medication were discussed with patient in detail. Recheck P2Y12 to determine optimal platelet inhibition with clopidogrel. Also advised to avoid medications like omeprazole due to drug-drug interaction with clopidogrel. \par - Atorvastatin 80 mg at bedtime considering likely atheroembolic etiology of her stroke and age\par - She should follow up closely with her primary care physician for optimal vascular risk factors modifications including blood pressure goal less than 130/80 mmHg, HbA1c goal <7 and preferably 6-6.5 and optimal cholesterol management \par - She is advised to check blood pressure regularly at home, preferably at different times during the day and multiple days a week and was advised to keep a log of BPs to bring to primary care physician visit for further instructions regarding optimal BP management. Also advised to followup closely with PCP regarding regular blood work including monitoring of HbA1c and cholesterol profile\par - Repeat MRA head and neck without contrast as MRA NOVA to follow upon intracranial stenosis in about a year (in 10/2019)\par - Prolonged cardiac monitoring with ICM to screen for occult cardiac arrhythmias like atrial fibrillation being the cardiac source of embolism, as it could potentially change the measures of secondary stroke prevention. Advised to followup with her cardiologist (Dr. Rizvi) for ICM monitoring. Possibility of starting therapeutic anticoagulation is also discussed in detail, if she is diagnosed to have A. Fib in the future\par - Follow up with endocrinologist consultation for aggressive glycemic control \par - Advised to follow up with her PCP/endocrinologist for further evaluation of thyroid nodule incidentally noted on CUS \par - Repeat CUS/TCDs to follow up on cerebral vasculature annually \par \par - Above mentioned plan was discussed with patient in detail. I have answered all her questions to the best of my abilities. I have reviewed measures for secondary stroke prevention with the patient, including aggressive vascular risk factors modification, healthy diet, regular exercise and medication compliance. As well as discussed the need for calling 911 immediately in case of any symptoms concerning for stroke in the future. \par - I would follow her in the office in about 6 months or earlier as needed.

## 2019-02-27 NOTE — PHYSICAL EXAM
[Alert] : alert [No Acute Distress] : no acute distress [Well Nourished] : well nourished [Well Developed] : well developed [Normal Sclera/Conjunctiva] : normal sclera/conjunctiva [PERRL] : pupils equal, round and reactive to light [Normal Outer Ear/Nose] : the ears and nose were normal in appearance [Normal TMs] : both tympanic membranes were normal [Normal Hearing] : hearing was normal [Normal Nasal Mucosa] : the nasal mucosa was normal [No Neck Mass] : no neck mass was observed [Supple] : the neck was supple [Thyroid Not Enlarged] : the thyroid was not enlarged [No Respiratory Distress] : no respiratory distress [Normal Rate and Effort] : normal respiratory rhythm and effort [No Accessory Muscle Use] : no accessory muscle use [Clear to Auscultation] : lungs were clear to auscultation bilaterally [Normal Rate] : heart rate was normal  [Normal S1, S2] : normal S1 and S2 [Regular Rhythm] : with a regular rhythm [Normal Bowel Sounds] : normal bowel sounds [Not Tender] : non-tender [Soft] : abdomen soft [Not Distended] : not distended [No CVA Tenderness] : no ~M costovertebral angle tenderness [Kyphosis] : no kyphosis present [No Spinal Tenderness] : no spinal tenderness [Scoliosis] : scoliosis not present [Normal Gait] : normal gait [No Joint Swelling] : no joint swelling seen [No Clubbing, Cyanosis] : no clubbing  or cyanosis of the fingernails [Normal Strength/Tone] : muscle strength and tone were normal [No Rash] : no rash [Foot Ulcers] : no foot ulcers [No Skin Lesions] : no skin lesions [Acne] : no acne [Normal Reflexes] : deep tendon reflexes were 2+ and symmetric [No Motor Deficits] : the motor exam was normal [No Tremors] : no tremors [Oriented x3] : oriented to person, place, and time [Normal Insight/Judgement] : insight and judgment were intact [Normal Affect] : the affect was normal [Normal Mood] : the mood was normal

## 2019-02-27 NOTE — REVIEW OF SYSTEMS
[As Noted in HPI] : as noted in HPI [Negative] : Heme/Lymph [FreeTextEntry3] : In the process of being evaluated for possible DM retinopathy.

## 2019-02-27 NOTE — ASSESSMENT
[FreeTextEntry1] : 52 year old female with history of uncontrolled DM Type 2, HLD, HTN who presented for follow up. \par Patient's glycemic control has slightly worsened compared to before, HgA1C now of 6.9%. Will continue her current regimen since her glycemic control should improve more as she continues to lose weight. If no improvement by next visit, will consider therapy escalation\par \par \par -Continue Metformin 1000 mg BID\par -SMBGs 2-3 times per day\par -Bariatric diet \par \par Thyroid nodule\par -See full report \par -1.5 cm thyroid nodule on the left that appears to solid cystic, low risk. \par -Patient would like to observe nodule for now, will recheck thyroid ultrasound in 3months\par \par HLD\par -Continue Atorvastatin 40 mg daily \par \par HTN\par -Continue Losartan, Nifedipine and Metoprolol\par -BP slight elevated today \par \par \par -Will fax over recent bloodwork order \par RTC in 3 months.

## 2019-02-27 NOTE — HISTORY OF PRESENT ILLNESS
[FreeTextEntry1] : 52 Y/O R handed woman with vascular risk factors of HTN, DM II, HPLD and age is seen in the vascular neurology office for the evaluation and management of stroke, leading to hospital admission in 10/17.\par \par On 10/3/17, [place] train station, she was noted to have left facial droop and dysarthria - noted by her family members. Her driving was also reported to be "off" at that time as she was not able to keep the keep in the abbe. She was brought to [facility] for further evaluation by her family members. She was admitted to the hospital and was diagnosed to have stroke. She reports to be taking aspirin once a day since over a year or so - for ? HTN. She denies any personal history of DVT/PEs but reports to have family history of unprovoked DVT/PEs - unsure of the etiology of DVT/PEs. She denies any personal or family history of recurrent miscarriages. She also denies any family history of stroke at young age. After appropriate stroke work up, she was discharged home. Of note, she also reports to have off and on left facial numbness since last 1-2 months before her presentation to [facility] for stroke and was evaluated by Dr. Moses for the same but was not sure about the diagnosis. Of note, she reports to have snoring at night but denies any witnessed apneic episodes or excessive daytime sleepiness. Of note, she also reports to have off and on pulsatile tinnitus in the past but not recently. She is being followed by her cardiologist for ICM monitoring. \par \par Since her last office visit, she is diagnosed not to respond to clopidogrel based on P2Y12 testing and she was advised to increased the dose of clopidogrel but has not been able to undergo follow up blood testing due to personal reasons. \par \par She underwent gastric sleeve and hiatus hernia surgery and tolerated the procedure well. \par \par \par \par About 2 weeks ago, she presented to [facility] ED for left arm sharp pain without any weakness or sensory paresthesia lasting for about 2 days and improved with "pain medications". Currently she denies any focal neurological symptoms and reports to have current post-stroke mRS being 0. Denies any episodes of new or recurrent focal neurological symptoms concerning for stroke or TIA since her last office visit. Reports compliance with her medications and denies any significant side effects. \par \par As per the verbal report, she was evaluated by sleep disorder specialist and she reports that she is not diagnosed to have EBEN. \par \par \par \par \par \par ROS: All negative except documented in the history of present illness.\par \par Workup:\par CT brain (10/3/17): Early changes of ischemia in the right MCA distribution predominantly involving right basal ganglia\par MRI brain (10/17): Right MCA distribution embolic-looking infarct without significant hemorrhagic transformation, minimal leukoaraiosis\par CTA head and neck (10/17): No evidence of symptomatic intracranial or extracranial large vessel severe stenosis or occlusion\par LDL: 102 (10/17)\par HbA1c: 9.5 (10/17)\par Transthoracic echocardiogram: Normal mitral valve, probably normal aortic wall, normal left atrium, normal left systolic function, no segmental wall motion abnormalities, concentric left ventricular remodeling, agitated saline injection demonstrates no obvious intracardiac shunt\par Transesophageal echocardiogram: Minimal mitral regurgitation, normal left atrium, no left atrial or left atrial appendage thrombus, normal left ventricular systolic function, no segmental wall motion abnormalities, contrast injection demonstrates no evidence of a PFO\par Hypercoagulable workup: Unrevealing to my eye\par Prolonged cardiac monitor: ICM \par \par Home medications:\par Reviewed with the patient and updated as appropriate.

## 2019-02-27 NOTE — HISTORY OF PRESENT ILLNESS
[FreeTextEntry1] : Diabetes Follow up Patient HPI\par \par S/p Gastric sleeve on september 21st 2018 \par Since then she has come off of most of her DM meds and only on basaglar at this time. \par She has had a 32 lbs weight loss since the last visit in 11/2018 \par \par HPI:\par \par Duration of Diabetes: 12+ \par Is patient on Insulin? Yes \par If yes, how long on insulin? 6 months ago \par \par List Current Medications for Glycemic control and the doses:\par 1- Metformin 1000 mg BID \par \par \par Other Meds\par --------------\par Lipitor 80 mg daily\par Losartan 50 mg daily \par Aspirin 81 mg \par Nifedipine \par Metoprolol \par \par SMBG (self monitored blood glucose) readings: \par - Name of glucometer: \par - How often does the patient check BG? Once a week \par \par If detailed record is available, what is the range of most of the BG readings?\par - Before Breakfast: 130s\par \par Does patient get Hypoglycemic episodes? Never \par \par \par Diabetic Complications: Is patient aware of having any of those complications?\par - Eyes: Retinopathy? None, may have low grade glaucoma started on latanoprost \par  	When was the last fully dilated eye exam? 08//2018, next appointment in 02/2018\par - Feet: 	Neuropathy? Just in the feet \par  	Foot Ulcers? None \par 	When was the last time patient saw a Podiatrist? 4-5 years ago \par - Kidneys: Nephropathy? GFr of 85 \par \par Diet: review patient's diet: \par She is currently on bariatric diet and lost 50 lbs since last visit after her sleeve \par \par Exercise: review patient exercise habits: Walking ( Increased more than before) \par \par Symptoms: Write any symptoms, concerns and issues bothering the patient which have not be addressed above: \par No polyuria or polydipsia\par Occasional blurry vision \par \par \par Thyroid Nodule\par ------------------\par -Previous visit found to have a 1.5 cm solid cystic low suspicion nodule present, on the left \par -Reported that there was an incidental finding of thyroid nodule on her cervical MRI\par -No follow up thyroid ultrasound was done. \par -Denied any compressive symptoms\par -Does not have any family history of thyroid cancer or head or neck radiation \par \par

## 2019-02-27 NOTE — REVIEW OF SYSTEMS
[Fatigue] : no fatigue [Decreased Appetite] : appetite not decreased [Recent Weight Gain (___ Lbs)] : no recent weight gain [Recent Weight Loss (___ Lbs)] : recent [unfilled] ~Ulb weight loss [Visual Field Defect] : no visual field defect [Blurry Vision] : no blurred vision [Eyes Itch] : no itching of the eyes [Dysphagia] : no dysphagia [Dysphonia] : no dysphonia [Chest Pain] : no chest pain [Palpitations] : no palpitations [Shortness Of Breath] : no shortness of breath [Wheezing] : no wheezing was heard [Cough] : no cough [Nausea] : no nausea [Vomiting] : no vomiting was observed [Constipation] : no constipation [Diarrhea] : no diarrhea [Polyuria] : no polyuria [Irregular Menses] : regular menses [Joint Pain] : no joint pain [Joint Stiffness] : no joint stiffness [Acanthosis] : no acanthosis  [Hirsutism] : no hirsutism [Acne] : no acne [Hair Loss] : no hair loss [Headache] : no headaches [Tremors] : no tremors [Dizziness] : no dizziness [Depression] : no depression [Anxiety] : no anxiety [Polydipsia] : no polydipsia [Galactorrhea] : no galactorrhea  [Cold Intolerance] : cold tolerant [Heat Intolerance] : heat tolerant [Easy Bleeding] : no ~M tendency for easy bleeding [Easy Bruising] : no tendency for easy bruising [Swelling] : no swelling

## 2019-02-27 NOTE — PHYSICAL EXAM
[FreeTextEntry1] : General exam: Sitting in the chair and does not appear to be in distress\par Carotid bruits: None\par CVS: S1-S2 present\par RS: CTA B\par Skin: No lesion noted on visible skin \par \par Neuro exam: \par MS: Alert, awake and is oriented to time, place and person with normal attention span, normal recent and remote memory\par Language: Fluent speech with intact comprehension, with intact naming and repetition, no right-left confusion, no finger agnosia and no apraxia. Normal fund of knowledge. No dysarthria. \par Cr.N.: Pupils bilaterally 3-4 mm in size, equal, round and reactive to light, fundus examination was performed as I was unable to locate the discs due to technical difficulties including poor fixation, intact visual fields to confrontation, extraocular movements intact without any diplopia, no ptosis, no nystagmus, left nasolabial flattening without any obvious facial droop and with intact facial sensations, no hearing loss to rubbing fingers, tongue is in the midline, uvula elevates in the midline and without any drooping of the soft palate, normal shrugging of the shoulders bilaterally.\par \par Motor: \par Tone - Normal\par Bulk - No atrophy\par Power - No pronator drift\par RUE and RLE - 5/5\par LUE - Proximally 5-/5 and distally 5/5, intact fine finger movements\par LLE - 5/5\par DTRs - +2 all over and bilaterally symmetric\par Plantars: Right flexor and left equivocal\par Sensory: Intact to light touch and pinprick, no sensory extinction. \par Cerebellar: No ataxia on finger-nose-finger and knee-heel-shin testing, as well as without any dysdiadochokinesia\par Gait: Normal casual gait with normal stride and length and was able to perform toe (some limping on the left leg while performing toe walking), heel and tandem walking\par Romberg's sign - Absent. \par

## 2019-03-15 ENCOUNTER — TRANSCRIPTION ENCOUNTER (OUTPATIENT)
Age: 54
End: 2019-03-15

## 2019-03-25 ENCOUNTER — APPOINTMENT (OUTPATIENT)
Dept: ELECTROPHYSIOLOGY | Facility: CLINIC | Age: 54
End: 2019-03-25
Payer: MEDICAID

## 2019-03-25 PROCEDURE — 93298 REM INTERROG DEV EVAL SCRMS: CPT

## 2019-03-25 PROCEDURE — 93299: CPT

## 2019-04-08 ENCOUNTER — APPOINTMENT (OUTPATIENT)
Dept: OPHTHALMOLOGY | Facility: CLINIC | Age: 54
End: 2019-04-08
Payer: MEDICAID

## 2019-04-08 DIAGNOSIS — E11.3313 TYPE 2 DIABETES MELLITUS WITH MODERATE NONPROLIFERATIVE DIABETIC RETINOPATHY WITH MACULAR EDEMA, BILATERAL: ICD-10-CM

## 2019-04-08 DIAGNOSIS — H40.003 PREGLAUCOMA, UNSPECIFIED, BILATERAL: ICD-10-CM

## 2019-04-08 DIAGNOSIS — D86.9 SARCOIDOSIS, UNSPECIFIED: ICD-10-CM

## 2019-04-08 PROCEDURE — 92014 COMPRE OPH EXAM EST PT 1/>: CPT

## 2019-04-08 PROCEDURE — 92134 CPTRZ OPH DX IMG PST SGM RTA: CPT

## 2019-04-16 ENCOUNTER — APPOINTMENT (OUTPATIENT)
Dept: SURGERY | Facility: CLINIC | Age: 54
End: 2019-04-16
Payer: MEDICAID

## 2019-04-16 VITALS
RESPIRATION RATE: 16 BRPM | TEMPERATURE: 97.8 F | WEIGHT: 216 LBS | SYSTOLIC BLOOD PRESSURE: 185 MMHG | BODY MASS INDEX: 34.72 KG/M2 | OXYGEN SATURATION: 100 % | DIASTOLIC BLOOD PRESSURE: 102 MMHG | HEIGHT: 66 IN | HEART RATE: 64 BPM

## 2019-04-16 DIAGNOSIS — G47.33 OBSTRUCTIVE SLEEP APNEA (ADULT) (PEDIATRIC): ICD-10-CM

## 2019-04-16 DIAGNOSIS — E66.01 MORBID (SEVERE) OBESITY DUE TO EXCESS CALORIES: ICD-10-CM

## 2019-04-16 PROCEDURE — 99213 OFFICE O/P EST LOW 20 MIN: CPT

## 2019-04-16 RX ORDER — ATORVASTATIN CALCIUM 80 MG/1
80 TABLET, FILM COATED ORAL
Qty: 30 | Refills: 6 | Status: DISCONTINUED | COMMUNITY
Start: 2017-12-11 | End: 2019-04-16

## 2019-04-22 ENCOUNTER — RX RENEWAL (OUTPATIENT)
Age: 54
End: 2019-04-22

## 2019-04-22 LAB
25(OH)D3 SERPL-MCNC: 32.5 NG/ML
ALBUMIN SERPL ELPH-MCNC: 3.9 G/DL
ALP BLD-CCNC: 74 U/L
ALT SERPL-CCNC: 10 U/L
ANION GAP SERPL CALC-SCNC: 12 MMOL/L
AST SERPL-CCNC: 14 U/L
BASOPHILS # BLD AUTO: 0.09 K/UL
BASOPHILS NFR BLD AUTO: 1.3 %
BILIRUB SERPL-MCNC: 0.4 MG/DL
BUN SERPL-MCNC: 15 MG/DL
CALCIUM SERPL-MCNC: 9.7 MG/DL
CHLORIDE SERPL-SCNC: 101 MMOL/L
CO2 SERPL-SCNC: 27 MMOL/L
CREAT SERPL-MCNC: 0.86 MG/DL
EOSINOPHIL # BLD AUTO: 0.14 K/UL
EOSINOPHIL NFR BLD AUTO: 2.1 %
ESTIMATED AVERAGE GLUCOSE: 163 MG/DL
FOLATE SERPL-MCNC: >20 NG/ML
GLUCOSE SERPL-MCNC: 111 MG/DL
HBA1C MFR BLD HPLC: 7.3 %
HCT VFR BLD CALC: 37.5 %
HGB BLD-MCNC: 11.1 G/DL
IMM GRANULOCYTES NFR BLD AUTO: 0.3 %
IRON SERPL-MCNC: 80 UG/DL
LYMPHOCYTES # BLD AUTO: 2.09 K/UL
LYMPHOCYTES NFR BLD AUTO: 31 %
MAN DIFF?: NORMAL
MCHC RBC-ENTMCNC: 23.4 PG
MCHC RBC-ENTMCNC: 29.6 GM/DL
MCV RBC AUTO: 78.9 FL
MONOCYTES # BLD AUTO: 0.58 K/UL
MONOCYTES NFR BLD AUTO: 8.6 %
NEUTROPHILS # BLD AUTO: 3.83 K/UL
NEUTROPHILS NFR BLD AUTO: 56.7 %
PLATELET # BLD AUTO: 367 K/UL
POTASSIUM SERPL-SCNC: 4.6 MMOL/L
PROT SERPL-MCNC: 6.6 G/DL
RBC # BLD: 4.75 M/UL
RBC # FLD: 15.6 %
SODIUM SERPL-SCNC: 140 MMOL/L
VIT B12 SERPL-MCNC: >2000 PG/ML
WBC # FLD AUTO: 6.75 K/UL

## 2019-04-24 LAB — VIT B1 SERPL-MCNC: 175.5 NMOL/L

## 2019-04-26 ENCOUNTER — APPOINTMENT (OUTPATIENT)
Dept: ELECTROPHYSIOLOGY | Facility: CLINIC | Age: 54
End: 2019-04-26
Payer: MEDICAID

## 2019-04-26 PROCEDURE — 93298 REM INTERROG DEV EVAL SCRMS: CPT

## 2019-04-26 PROCEDURE — 93299: CPT

## 2019-05-29 ENCOUNTER — APPOINTMENT (OUTPATIENT)
Dept: ELECTROPHYSIOLOGY | Facility: CLINIC | Age: 54
End: 2019-05-29
Payer: MEDICAID

## 2019-05-29 PROCEDURE — 93299: CPT

## 2019-05-29 PROCEDURE — 93298 REM INTERROG DEV EVAL SCRMS: CPT

## 2019-05-30 ENCOUNTER — INBOUND DOCUMENT (OUTPATIENT)
Age: 54
End: 2019-05-30

## 2019-06-05 ENCOUNTER — APPOINTMENT (OUTPATIENT)
Dept: ENDOCRINOLOGY | Facility: CLINIC | Age: 54
End: 2019-06-05
Payer: MEDICAID

## 2019-06-05 VITALS
OXYGEN SATURATION: 99 % | BODY MASS INDEX: 32.96 KG/M2 | WEIGHT: 210 LBS | SYSTOLIC BLOOD PRESSURE: 124 MMHG | HEIGHT: 67 IN | DIASTOLIC BLOOD PRESSURE: 80 MMHG | HEART RATE: 74 BPM

## 2019-06-05 PROCEDURE — 99214 OFFICE O/P EST MOD 30 MIN: CPT

## 2019-06-05 NOTE — ASSESSMENT
[FreeTextEntry1] : 53 year old female with history of uncontrolled DM Type 2, HLD, HTN who presented for follow up. \par Patient's glycemic control has slightly worsened compared to before, HgA1C now of 7.3% ( 04/2019) . \par She has lost more weight and has been having some am hypoglycemia. Would decrease the PM metformin dose at this time.\par \par \par -Decrease metformin to 1000 mg in am and 500 mg with dinner \par -SMBGs 2-3 times per day\par -Bariatric diet \par \par Thyroid nodule\par -1.5 cm thyroid nodule on the left that appears to solid cystic, low risk. \par -Patient would like to observe nodule for now, will schedule for thyroid ultrasound in 3 months \par \par HLD\par -Continue Atorvastatin 40 mg daily \par -Check lipid panel on the next visit \par \par HTN\par -Continue Losartan, Nifedipine and Metoprolol\par -BP at goal \par \par \par RTC in 3 months. \par \par

## 2019-06-05 NOTE — PHYSICAL EXAM
[Alert] : alert [No Acute Distress] : no acute distress [Well Nourished] : well nourished [Normal Sclera/Conjunctiva] : normal sclera/conjunctiva [Well Developed] : well developed [No Proptosis] : no proptosis [PERRL] : pupils equal, round and reactive to light [EOMI] : extra ocular movement intact [Normal Outer Ear/Nose] : the ears and nose were normal in appearance [Normal Hearing] : hearing was normal [Normal TMs] : both tympanic membranes were normal [No Neck Mass] : no neck mass was observed [Supple] : the neck was supple [Normal Rate and Effort] : normal respiratory rhythm and effort [Thyroid Not Enlarged] : the thyroid was not enlarged [No Respiratory Distress] : no respiratory distress [Clear to Auscultation] : lungs were clear to auscultation bilaterally [Normal Rate] : heart rate was normal  [Normal S1, S2] : normal S1 and S2 [Regular Rhythm] : with a regular rhythm [Normal Bowel Sounds] : normal bowel sounds [Not Tender] : non-tender [Soft] : abdomen soft [No CVA Tenderness] : no ~M costovertebral angle tenderness [Kyphosis] : no kyphosis present [Normal] : normal and non tender [No Spinal Tenderness] : no spinal tenderness [Scoliosis] : scoliosis not present [Normal Gait] : normal gait [No Clubbing, Cyanosis] : no clubbing  or cyanosis of the fingernails [No Joint Swelling] : no joint swelling seen [Normal Strength/Tone] : muscle strength and tone were normal [No Rash] : no rash [Foot Ulcers] : no foot ulcers [Acne] : no acne [No Skin Lesions] : no skin lesions [Normal Reflexes] : deep tendon reflexes were 2+ and symmetric [No Motor Deficits] : the motor exam was normal [Oriented x3] : oriented to person, place, and time [No Tremors] : no tremors [Normal Mood] : the mood was normal [Normal Insight/Judgement] : insight and judgment were intact [Normal Affect] : the affect was normal

## 2019-06-05 NOTE — REVIEW OF SYSTEMS
[Fatigue] : no fatigue [Recent Weight Gain (___ Lbs)] : no recent weight gain [Decreased Appetite] : appetite not decreased [Recent Weight Loss (___ Lbs)] : recent [unfilled] ~Ulb weight loss [Visual Field Defect] : no visual field defect [Dry Eyes] : no dryness of the eyes [Blurry Vision] : no blurred vision [Eyes Itch] : no itching of the eyes [Dysphagia] : no dysphagia [Dysphonia] : no dysphonia [Neck Pain] : no neck pain [Palpitations] : no palpitations [Nasal Congestion] : no nasal congestion [Chest Pain] : no chest pain [Heart Rate Is Fast] : the heart rate was not fast [Heart Rate Is Slow] : the heart rate was not slow [Shortness Of Breath] : no shortness of breath [Wheezing] : no wheezing was heard [Cough] : no cough [SOB on Exertion] : no shortness of breath during exertion [Vomiting] : no vomiting was observed [Nausea] : no nausea [Constipation] : no constipation [Diarrhea] : no diarrhea [Irregular Menses] : regular menses [Polyuria] : no polyuria [Joint Pain] : no joint pain [Joint Stiffness] : no joint stiffness [Muscle Weakness] : no muscle weakness [Muscle Cramps] : no muscle cramps [Acanthosis] : no acanthosis  [Hirsutism] : no hirsutism [Acne] : no acne [Hair Loss] : no hair loss [Tremors] : no tremors [Headache] : no headaches [Dizziness] : no dizziness [Pain/Numbness of Digits] : no pain/numbness of digits [Anxiety] : no anxiety [Depression] : no depression [Galactorrhea] : no galactorrhea  [Polydipsia] : no polydipsia [Cold Intolerance] : cold tolerant [Easy Bruising] : no tendency for easy bruising [Easy Bleeding] : no ~M tendency for easy bleeding [Heat Intolerance] : heat tolerant [Swelling] : no swelling

## 2019-06-05 NOTE — HISTORY OF PRESENT ILLNESS
[FreeTextEntry1] : Diabetes Follow up Patient HPI\par \par S/p Gastric sleeve on september 21st 2018 \par Since then she has come off of most of her DM meds and only on metformin at this time. \par She has had a 45 lbs weight loss since the last visit in 11/2018 \par \par HPI:\par \par Duration of Diabetes: 12+ \par Is patient on Insulin? Yes \par If yes, how long on insulin? 6 months ago \par \par List Current Medications for Glycemic control and the doses:\par 1- Metformin 1000 mg BID \par \par \par Other Meds\par --------------\par Lipitor 80 mg daily\par Losartan 50 mg daily \par Aspirin 81 mg \par Nifedipine \par Metoprolol \par \par SMBG (self monitored blood glucose) readings: \par - Name of glucometer: \par - How often does the patient check BG? Twice a day \par \par If detailed record is available, what is the range of most of the BG readings?\par - Before Breakfast: <100 \par - Right after dinner: 180-195 \par \par Does patient get Hypoglycemic episodes? Couple of times in the morning \par -Shaky and sweaty \par Lowest BG was 65 \par \par \par Diabetic Complications: Is patient aware of having any of those complications?\par - Eyes: Retinopathy? None, may have low grade glaucoma started on latanoprost ( may have some retinopathy in the left eye) \par  	When was the last fully dilated eye exam? 05/2019\par - Feet: 	Neuropathy? None \par  	Foot Ulcers? None \par 	When was the last time patient saw a Podiatrist? 05/2019 for onchomycosis \par - Kidneys: Nephropathy? GFr of 85 \par \par Diet: review patient's diet: \par She is currently on bariatric diet and lost 50 lbs since last visit after her sleeve \par \par Exercise: review patient exercise habits: Walking ( Increased more than before) \par \par Symptoms: Write any symptoms, concerns and issues bothering the patient which have not be addressed above: \par No polyuria or polydipsia\par Occasional blurry vision \par \par \par Thyroid Nodule\par ------------------\par -Previous visit found to have a 1.5 cm solid cystic low suspicion nodule present, on the left \par -Reported that there was an incidental finding of thyroid nodule on her cervical MRI\par -No follow up thyroid ultrasound was done. \par -Denied any compressive symptoms\par -Does not have any family history of thyroid cancer or head or neck radiation \par \par

## 2019-07-02 ENCOUNTER — APPOINTMENT (OUTPATIENT)
Dept: ELECTROPHYSIOLOGY | Facility: CLINIC | Age: 54
End: 2019-07-02
Payer: MEDICAID

## 2019-07-02 PROCEDURE — 93298 REM INTERROG DEV EVAL SCRMS: CPT

## 2019-07-02 PROCEDURE — 93299: CPT

## 2019-07-05 NOTE — DISCHARGE NOTE ADULT - CARE PROVIDER_API CALL
Jaylen Fuller), Surgery  [address] [facility]  [address] [place] [id number]  Phone: [phone]  Fax: [phone] Post-Care Instructions: I reviewed with the patient in detail post-care instructions. Patient is not to engage in any heavy lifting, exercise, or swimming for the next 14 days. Should the patient develop any fevers, chills, bleeding, severe pain patient will contact the office immediately.

## 2019-07-11 ENCOUNTER — APPOINTMENT (OUTPATIENT)
Dept: OPHTHALMOLOGY | Facility: CLINIC | Age: 54
End: 2019-07-11
Payer: MEDICAID

## 2019-07-11 ENCOUNTER — NON-APPOINTMENT (OUTPATIENT)
Age: 54
End: 2019-07-11

## 2019-07-11 PROCEDURE — 92235 FLUORESCEIN ANGRPH MLTIFRAME: CPT

## 2019-07-18 ENCOUNTER — APPOINTMENT (OUTPATIENT)
Dept: ELECTROPHYSIOLOGY | Facility: CLINIC | Age: 54
End: 2019-07-18
Payer: MEDICAID

## 2019-07-18 PROCEDURE — 93285 PRGRMG DEV EVAL SCRMS IP: CPT

## 2019-07-22 ENCOUNTER — NON-APPOINTMENT (OUTPATIENT)
Age: 54
End: 2019-07-22

## 2019-07-22 ENCOUNTER — APPOINTMENT (OUTPATIENT)
Dept: OPHTHALMOLOGY | Facility: CLINIC | Age: 54
End: 2019-07-22
Payer: MEDICAID

## 2019-07-22 PROCEDURE — 92226: CPT | Mod: RT

## 2019-07-22 PROCEDURE — 92012 INTRM OPH EXAM EST PATIENT: CPT

## 2019-07-22 PROCEDURE — 92134 CPTRZ OPH DX IMG PST SGM RTA: CPT

## 2019-07-30 ENCOUNTER — APPOINTMENT (OUTPATIENT)
Dept: SURGERY | Facility: CLINIC | Age: 54
End: 2019-07-30
Payer: MEDICAID

## 2019-07-30 VITALS
OXYGEN SATURATION: 97 % | BODY MASS INDEX: 33.59 KG/M2 | RESPIRATION RATE: 16 BRPM | SYSTOLIC BLOOD PRESSURE: 143 MMHG | DIASTOLIC BLOOD PRESSURE: 80 MMHG | HEART RATE: 72 BPM | HEIGHT: 67 IN | WEIGHT: 214 LBS

## 2019-07-30 DIAGNOSIS — H25.13 AGE-RELATED NUCLEAR CATARACT, BILATERAL: ICD-10-CM

## 2019-07-30 PROCEDURE — 99213 OFFICE O/P EST LOW 20 MIN: CPT

## 2019-08-05 ENCOUNTER — APPOINTMENT (OUTPATIENT)
Dept: NEUROLOGY | Facility: CLINIC | Age: 54
End: 2019-08-05
Payer: MEDICAID

## 2019-08-05 PROCEDURE — 93892 TCD EMBOLI DETECT W/O INJ: CPT

## 2019-08-05 PROCEDURE — 93886 INTRACRANIAL COMPLETE STUDY: CPT

## 2019-08-05 PROCEDURE — 93880 EXTRACRANIAL BILAT STUDY: CPT

## 2019-08-20 ENCOUNTER — APPOINTMENT (OUTPATIENT)
Dept: ELECTROPHYSIOLOGY | Facility: CLINIC | Age: 54
End: 2019-08-20
Payer: MEDICAID

## 2019-08-20 PROCEDURE — 93298 REM INTERROG DEV EVAL SCRMS: CPT

## 2019-08-20 PROCEDURE — 93299: CPT

## 2019-08-28 ENCOUNTER — APPOINTMENT (OUTPATIENT)
Dept: NEUROLOGY | Facility: CLINIC | Age: 54
End: 2019-08-28
Payer: MEDICAID

## 2019-08-28 VITALS
SYSTOLIC BLOOD PRESSURE: 167 MMHG | DIASTOLIC BLOOD PRESSURE: 87 MMHG | HEIGHT: 67 IN | WEIGHT: 214 LBS | HEART RATE: 66 BPM | BODY MASS INDEX: 33.59 KG/M2

## 2019-08-28 PROCEDURE — 99214 OFFICE O/P EST MOD 30 MIN: CPT

## 2019-09-03 ENCOUNTER — NON-APPOINTMENT (OUTPATIENT)
Age: 54
End: 2019-09-03

## 2019-09-03 ENCOUNTER — APPOINTMENT (OUTPATIENT)
Dept: OPHTHALMOLOGY | Facility: CLINIC | Age: 54
End: 2019-09-03
Payer: MEDICAID

## 2019-09-03 PROCEDURE — 92020 GONIOSCOPY: CPT

## 2019-09-03 PROCEDURE — 92250 FUNDUS PHOTOGRAPHY W/I&R: CPT

## 2019-09-03 PROCEDURE — 92014 COMPRE OPH EXAM EST PT 1/>: CPT

## 2019-09-03 PROCEDURE — 92083 EXTENDED VISUAL FIELD XM: CPT

## 2019-09-10 ENCOUNTER — INBOUND DOCUMENT (OUTPATIENT)
Age: 54
End: 2019-09-10

## 2019-09-19 ENCOUNTER — RX RENEWAL (OUTPATIENT)
Age: 54
End: 2019-09-19

## 2019-09-20 ENCOUNTER — APPOINTMENT (OUTPATIENT)
Dept: ELECTROPHYSIOLOGY | Facility: CLINIC | Age: 54
End: 2019-09-20
Payer: MEDICAID

## 2019-09-20 PROCEDURE — 93298 REM INTERROG DEV EVAL SCRMS: CPT

## 2019-09-20 PROCEDURE — 93299: CPT

## 2019-09-23 ENCOUNTER — TRANSCRIPTION ENCOUNTER (OUTPATIENT)
Age: 54
End: 2019-09-23

## 2019-09-23 ENCOUNTER — OTHER (OUTPATIENT)
Age: 54
End: 2019-09-23

## 2019-10-16 ENCOUNTER — APPOINTMENT (OUTPATIENT)
Dept: ENDOCRINOLOGY | Facility: CLINIC | Age: 54
End: 2019-10-16
Payer: MEDICAID

## 2019-10-16 VITALS
HEART RATE: 62 BPM | WEIGHT: 215 LBS | SYSTOLIC BLOOD PRESSURE: 130 MMHG | OXYGEN SATURATION: 97 % | HEIGHT: 67 IN | DIASTOLIC BLOOD PRESSURE: 80 MMHG | BODY MASS INDEX: 33.74 KG/M2

## 2019-10-16 PROCEDURE — 99214 OFFICE O/P EST MOD 30 MIN: CPT | Mod: 25

## 2019-10-16 PROCEDURE — 76536 US EXAM OF HEAD AND NECK: CPT | Mod: 52

## 2019-10-16 NOTE — PHYSICAL EXAM
[Alert] : alert [No Acute Distress] : no acute distress [Well Nourished] : well nourished [Well Developed] : well developed [Normal Sclera/Conjunctiva] : normal sclera/conjunctiva [PERRL] : pupils equal, round and reactive to light [EOMI] : extra ocular movement intact [No Proptosis] : no proptosis [Normal Outer Ear/Nose] : the ears and nose were normal in appearance [Normal TMs] : both tympanic membranes were normal [Normal Hearing] : hearing was normal [No Neck Mass] : no neck mass was observed [Supple] : the neck was supple [Thyroid Not Enlarged] : the thyroid was not enlarged [No Respiratory Distress] : no respiratory distress [Normal Rate and Effort] : normal respiratory rhythm and effort [Clear to Auscultation] : lungs were clear to auscultation bilaterally [Normal Rate] : heart rate was normal  [Normal S1, S2] : normal S1 and S2 [Regular Rhythm] : with a regular rhythm [Normal Bowel Sounds] : normal bowel sounds [Normal Gait] : normal gait [No Joint Swelling] : no joint swelling seen [No Clubbing, Cyanosis] : no clubbing  or cyanosis of the fingernails [Normal Strength/Tone] : muscle strength and tone were normal [No Rash] : no rash [No Skin Lesions] : no skin lesions [Normal Reflexes] : deep tendon reflexes were 2+ and symmetric [No Motor Deficits] : the motor exam was normal [No Tremors] : no tremors [Oriented x3] : oriented to person, place, and time [Normal Insight/Judgement] : insight and judgment were intact [Normal Affect] : the affect was normal [Normal Mood] : the mood was normal

## 2019-10-16 NOTE — REVIEW OF SYSTEMS
[Fatigue] : no fatigue [Decreased Appetite] : appetite not decreased [Recent Weight Gain (___ Lbs)] : no recent weight gain [Recent Weight Loss (___ Lbs)] : no recent weight loss [Visual Field Defect] : no visual field defect [Blurry Vision] : no blurred vision [Dry Eyes] : no dryness of the eyes [Eyes Itch] : no itching of the eyes [Dysphagia] : no dysphagia [Dysphonia] : no dysphonia [Neck Pain] : no neck pain [Nasal Congestion] : no nasal congestion [Chest Pain] : no chest pain [Palpitations] : no palpitations [Heart Rate Is Slow] : the heart rate was not slow [Heart Rate Is Fast] : the heart rate was not fast [Shortness Of Breath] : no shortness of breath [Wheezing] : no wheezing was heard [Cough] : no cough [SOB on Exertion] : no shortness of breath during exertion [Nausea] : no nausea [Vomiting] : no vomiting was observed [Constipation] : no constipation [Polyuria] : no polyuria [Irregular Menses] : regular menses [Joint Pain] : no joint pain [Joint Stiffness] : no joint stiffness [Muscle Weakness] : no muscle weakness [Muscle Cramps] : no muscle cramps [Acanthosis] : no acanthosis  [Hirsutism] : no hirsutism [Acne] : no acne [Hair Loss] : no hair loss [Headache] : no headaches [Tremors] : no tremors [Depression] : no depression [Anxiety] : no anxiety [Polydipsia] : no polydipsia [Galactorrhea] : no galactorrhea  [Easy Bleeding] : no ~M tendency for easy bleeding [Easy Bruising] : no tendency for easy bruising [Swelling] : no swelling

## 2019-10-16 NOTE — HISTORY OF PRESENT ILLNESS
[FreeTextEntry1] : Diabetes Follow up Patient HPI\par \par S/p Gastric sleeve on september 21st 2018 \par Since then she has come off of most of her DM meds and only on metformin at this time. \par She has had a 45 lbs weight loss since the last visit in 11/2018 \par \par HPI:\par \par Duration of Diabetes: 12+ \par Is patient on Insulin? Yes \par If yes, how long on insulin? 6 months ago \par HgA1C of 8.1% \par \par List Current Medications for Glycemic control and the doses:\par 1- Metformin 1000 mg BID \par \par \par Other Meds\par --------------\par Lipitor 80 mg daily\par Losartan 50 mg daily \par Aspirin 81 mg \par Nifedipine \par Metoprolol \par \par SMBG (self monitored blood glucose) readings: \par - Name of glucometer: \par - How often does the patient check BG? Twice a day \par \par If detailed record is available, what is the range of most of the BG readings?\par - Before Breakfast: 100-140\par - Right after dinner: 180-195 \par \par Does patient get Hypoglycemic episodes? None \par \par \par Diabetic Complications: Is patient aware of having any of those complications?\par - Eyes: Retinopathy? None, may have low grade glaucoma started on latanoprost ( may have some retinopathy in the left eye)- pending laser  \par  	When was the last fully dilated eye exam?08/2019\par - Feet: 	Neuropathy? None \par  	Foot Ulcers? None \par 	When was the last time patient saw a Podiatrist? 05/2019 for onchomycosis \par - Kidneys: Nephropathy? GFr of 85 \par \par Diet: review patient's diet: \par She is currently on bariatric diet and lost 50 lbs since last visit after her sleeve \par \par Exercise: review patient exercise habits: Walking ( Increased more than before) \par \par Symptoms: Write any symptoms, concerns and issues bothering the patient which have not be addressed above: \par No polyuria or polydipsia\par Occasional blurry vision \par \par \par Thyroid Nodule\par ------------------\par -Previous visit found to have a 1.5 cm solid cystic low suspicion nodule present, on the left \par -This visit- nodule is 1.27 x 1.17 x 1.41 cm, other cysts present as well \par -Reported that there was an incidental finding of thyroid nodule on her cervical MRI\par -Denied any compressive symptoms\par -Reported that her sister and aunt has history of thyroid cancer \par

## 2019-10-16 NOTE — ASSESSMENT
[FreeTextEntry1] : 53 year old female with history of uncontrolled DM Type 2, HLD, HTN who presented for follow up. \par Patient's glycemic control has slightly worsened compared to before, HgA1C now of 8% ( 04/2019). \par Will make the addition of jardiance at this time to assist with glycemic control and has some weight loss effect as well. \par \par \par -Continue Metformin 1000 mg BID \par -Start Jardiance 10 mg daily \par -SMBGs 2-3 times per day\par -Bariatric diet \par \par Thyroid nodule\par -1.27 cm mixed nodule. \par - Strong family history of thyroid cancer\par -Will schedule for FNA \par \par HLD\par -Continue Atorvastatin 40 mg daily \par \par \par HTN\par -Continue Losartan, Nifedipine and Metoprolol\par -BP at goal \par \par \par RTC in 3 months.

## 2019-10-18 ENCOUNTER — NON-APPOINTMENT (OUTPATIENT)
Age: 54
End: 2019-10-18

## 2019-10-18 ENCOUNTER — APPOINTMENT (OUTPATIENT)
Dept: OPHTHALMOLOGY | Facility: CLINIC | Age: 54
End: 2019-10-18
Payer: MEDICAID

## 2019-10-18 PROCEDURE — 76514 ECHO EXAM OF EYE THICKNESS: CPT

## 2019-10-18 PROCEDURE — 92012 INTRM OPH EXAM EST PATIENT: CPT

## 2019-10-22 ENCOUNTER — APPOINTMENT (OUTPATIENT)
Dept: OPHTHALMOLOGY | Facility: CLINIC | Age: 54
End: 2019-10-22

## 2019-10-22 ENCOUNTER — APPOINTMENT (OUTPATIENT)
Dept: ELECTROPHYSIOLOGY | Facility: CLINIC | Age: 54
End: 2019-10-22
Payer: MEDICAID

## 2019-10-22 PROCEDURE — 93299: CPT

## 2019-10-22 PROCEDURE — 93298 REM INTERROG DEV EVAL SCRMS: CPT

## 2019-10-25 ENCOUNTER — TRANSCRIPTION ENCOUNTER (OUTPATIENT)
Age: 54
End: 2019-10-25

## 2019-10-28 ENCOUNTER — FORM ENCOUNTER (OUTPATIENT)
Age: 54
End: 2019-10-28

## 2019-10-29 ENCOUNTER — APPOINTMENT (OUTPATIENT)
Dept: MRI IMAGING | Facility: HOSPITAL | Age: 54
End: 2019-10-29

## 2019-10-29 ENCOUNTER — OUTPATIENT (OUTPATIENT)
Dept: OUTPATIENT SERVICES | Facility: HOSPITAL | Age: 54
LOS: 1 days | End: 2019-10-29
Payer: MEDICAID

## 2019-10-29 DIAGNOSIS — I67.1 CEREBRAL ANEURYSM, NONRUPTURED: ICD-10-CM

## 2019-10-29 DIAGNOSIS — Z95.818 PRESENCE OF OTHER CARDIAC IMPLANTS AND GRAFTS: Chronic | ICD-10-CM

## 2019-10-29 DIAGNOSIS — Z98.89 OTHER SPECIFIED POSTPROCEDURAL STATES: Chronic | ICD-10-CM

## 2019-10-29 PROCEDURE — 70544 MR ANGIOGRAPHY HEAD W/O DYE: CPT | Mod: 26

## 2019-10-29 PROCEDURE — 70544 MR ANGIOGRAPHY HEAD W/O DYE: CPT

## 2019-10-29 PROCEDURE — 70547 MR ANGIOGRAPHY NECK W/O DYE: CPT | Mod: 26

## 2019-10-29 PROCEDURE — 70547 MR ANGIOGRAPHY NECK W/O DYE: CPT

## 2019-11-12 ENCOUNTER — RX RENEWAL (OUTPATIENT)
Age: 54
End: 2019-11-12

## 2019-11-18 ENCOUNTER — APPOINTMENT (OUTPATIENT)
Dept: OPHTHALMOLOGY | Facility: CLINIC | Age: 54
End: 2019-11-18
Payer: MEDICAID

## 2019-11-18 ENCOUNTER — NON-APPOINTMENT (OUTPATIENT)
Age: 54
End: 2019-11-18

## 2019-11-18 PROCEDURE — 92134 CPTRZ OPH DX IMG PST SGM RTA: CPT

## 2019-11-18 PROCEDURE — 92012 INTRM OPH EXAM EST PATIENT: CPT | Mod: 25

## 2019-11-18 PROCEDURE — 67210 TREATMENT OF RETINAL LESION: CPT | Mod: LT

## 2019-11-22 ENCOUNTER — APPOINTMENT (OUTPATIENT)
Dept: NEUROSURGERY | Facility: CLINIC | Age: 54
End: 2019-11-22
Payer: MEDICAID

## 2019-11-22 ENCOUNTER — APPOINTMENT (OUTPATIENT)
Dept: ELECTROPHYSIOLOGY | Facility: CLINIC | Age: 54
End: 2019-11-22
Payer: MEDICAID

## 2019-11-22 VITALS
BODY MASS INDEX: 32.96 KG/M2 | SYSTOLIC BLOOD PRESSURE: 177 MMHG | TEMPERATURE: 98 F | HEART RATE: 65 BPM | OXYGEN SATURATION: 95 % | DIASTOLIC BLOOD PRESSURE: 100 MMHG | HEIGHT: 67 IN | WEIGHT: 210 LBS

## 2019-11-22 VITALS — SYSTOLIC BLOOD PRESSURE: 158 MMHG | DIASTOLIC BLOOD PRESSURE: 94 MMHG

## 2019-11-22 DIAGNOSIS — Z86.69 PERSONAL HISTORY OF OTHER DISEASES OF THE NERVOUS SYSTEM AND SENSE ORGANS: ICD-10-CM

## 2019-11-22 DIAGNOSIS — Z63.5 DISRUPTION OF FAMILY BY SEPARATION AND DIVORCE: ICD-10-CM

## 2019-11-22 DIAGNOSIS — G93.2 BENIGN INTRACRANIAL HYPERTENSION: ICD-10-CM

## 2019-11-22 DIAGNOSIS — Z86.73 PERSONAL HISTORY OF TRANSIENT ISCHEMIC ATTACK (TIA), AND CEREBRAL INFARCTION W/OUT RESIDUAL DEFICITS: ICD-10-CM

## 2019-11-22 PROCEDURE — 99215 OFFICE O/P EST HI 40 MIN: CPT

## 2019-11-22 PROCEDURE — 93298 REM INTERROG DEV EVAL SCRMS: CPT

## 2019-11-22 PROCEDURE — 93299: CPT

## 2019-11-22 RX ORDER — ATORVASTATIN CALCIUM 80 MG/1
80 TABLET, FILM COATED ORAL
Refills: 0 | Status: ACTIVE | COMMUNITY

## 2019-11-22 RX ORDER — HYDRALAZINE HYDROCHLORIDE 50 MG/1
50 TABLET ORAL
Refills: 0 | Status: ACTIVE | COMMUNITY

## 2019-11-22 SDOH — SOCIAL STABILITY - SOCIAL INSECURITY: DISRUPTION OF FAMILY BY SEPARATION AND DIVORCE: Z63.5

## 2019-11-22 NOTE — HISTORY OF PRESENT ILLNESS
[FreeTextEntry1] : "I was called by your office to come for this visit" [de-identified] : Meri Kc is a pleasant right handed 53 year old AA lady who presents for consultation regarding M1 stenosis.  \par On 10/3/17, [place] train station, she was noted to have left facial droop and dysarthria - noted by her family members. Her driving was also reported to be "off" at that time as she was not able to keep the keep in the abbe. She was brought to [facility] for further evaluation by her family members. She was admitted to the hospital and was diagnosed to have stroke. She reports to be taking aspirin once a day since over a year or so - for ? HTN. She denied any personal history of DVT/PEs but reports to have family history of unprovoked DVT/PEs - unsure of the etiology of DVT/PEs. She denies any personal or family history of recurrent miscarriages. She also denies any family history of stroke at young age. After appropriate stroke work up, she was discharged home. Of note, she also reports to have off and on left facial numbness since last 1-2 months before her presentation to [facility] for stroke and was evaluated by Dr. Moses for the same but was not sure about the diagnosis. Of note, she reports to have snoring at night but denies any witnessed apneic episodes or excessive daytime sleepiness. Of note, she also reports to have off and on pulsatile tinnitus in the past but not recently. \par \par Today she reports that she feels fine except for intermittent mild headaches 3-4/10 not associated with other symptoms and relieved with rest.  She reports constant blurry vision due to glaucoma.  \par \par She reports that in early 2000's  she had 2 lumbar puncture 1 year interval to take off excess CSF - diagnosis pseudotumor cerebri.  She reports that she had frequent headaches and blurry vision with each episode.  Headaches and vision improved after spinal taps.  Pt does not have a VPS.\par \par Pt reports that she has a loop recorder device implanted.\par

## 2019-11-22 NOTE — ASSESSMENT
[FreeTextEntry1] : IMPRESSION:\par 1. Intracranial artery stenosis.\par \par \par PLAN:\par 1. NM spect suty with and without Diamox.\par 2. Cerebral angiogram

## 2019-11-22 NOTE — DATA REVIEWED
[de-identified] : \par EXAM: MR ANGIO BRAIN \par \par EXAM: MR ANGIO NECK \par \par \par PROCEDURE DATE: 10/29/2019 \par \par \par \par \par INTERPRETATION: Clinical indication: Follow-up significant distal right M1 \par stenosis compared with 10/8/2018 \par \par 2 D and 3-D axial noncontrast MRA were performed on the cervical and \par intracranial vessels, respectively. Intravascular flow quantification was \par performed using gated 2D phase contrast MR, imaged perpendicular to the \par vessel axis. Images were post processed NOVA software and a NOVA flow study \par report is available. \par \par There is appears to be complete complete occlusion of the right middle \par cerebral artery in its proximal right M1 segment and the distal right middle \par artery branches sylvian fissure are not identified, which is a change since \par the prior exam. \par \par Compared prior examination of 10/8/2018. There was significant reduction in \par flow on the right compared to the left but patent flow identified in the \par right M2 branches. Otherwise there is no significant interval change. \par \par Flow is as follows in milliliters. \par \par RCCA 228, KAREN 193, RMCA 50, RACA 107, RACA2 67 compared with prior 10/8/2018 \par \par RCCA 203, KAREN 172, RMCA 47, RACA 74, RACA2 88. \par \par LCCA 216, LICA 197, LMCA 121, LACA 82, LACA2 73 compared with prior \par \par LCCA 261, LICA 186, LMCA 92, LACA 51, LACA2 55. \par \par RVA 53, LVA 47, BA 64, RPCA 93, RPCOM 91 anterior to posterior, LPCA 73, \par LPCOM 26 anterior to posterior compared to \par \par RVA 43, LVA 50, BA 71, RPCA 80, RPCOM 51 anterior to posterior, LPCA 62, \par LPCOM 37 anterior to posterior. \par \par \par \par Impression: Compared with 10/8/2018 it appears that there has been occlusion \par of the mid to distal right M1 segment of the middle cerebral artery with \par nonvisualization of the distal M2 branches, which is a change compared to \par the prior exam when there was significant stenosis present. \par \par \par \par \par \par \par \par \par \par JACK BRADY M.D., ATTENDING RADIOLOGIST \par This document has been electronically signed. Oct 29 2019 1:41PM

## 2019-11-22 NOTE — REVIEW OF SYSTEMS
[Negative] : Heme/Lymph [FreeTextEntry3] : blurry vision [de-identified] : HEADACHES [FreeTextEntry4] : RINGING IN EARS

## 2019-11-22 NOTE — PHYSICAL EXAM
[General Appearance - Alert] : alert [General Appearance - In No Acute Distress] : in no acute distress [General Appearance - Well Nourished] : well nourished [General Appearance - Well Developed] : well developed [General Appearance - Well-Appearing] : healthy appearing [Oriented To Time, Place, And Person] : oriented to person, place, and time [Impaired Insight] : insight and judgment were intact [Affect] : the affect was normal [Mood] : the mood was normal [Memory Recent] : recent memory was not impaired [Person] : oriented to person [Place] : oriented to place [Time] : oriented to time [Cranial Nerves Optic (II)] : visual acuity intact bilaterally,  pupils equal round and reactive to light [Cranial Nerves Oculomotor (III)] : extraocular motion intact [Cranial Nerves Trigeminal (V)] : facial sensation intact symmetrically [Cranial Nerves Facial (VII)] : face symmetrical [Cranial Nerves Vestibulocochlear (VIII)] : hearing was intact bilaterally [Cranial Nerves Glossopharyngeal (IX)] : tongue and palate midline [Cranial Nerves Accessory (XI - Cranial And Spinal)] : head turning and shoulder shrug symmetric [Cranial Nerves Hypoglossal (XII)] : there was no tongue deviation with protrusion [Motor Strength] : muscle strength was normal in all four extremities [Motor Handedness Right-Handed] : the patient is right hand dominant [Balance] : balance was intact [Sclera] : the sclera and conjunctiva were normal [PERRL With Normal Accommodation] : pupils were equal in size, round, reactive to light, with normal accommodation [Extraocular Movements] : extraocular movements were intact [Outer Ear] : the ears and nose were normal in appearance [Hearing Threshold Finger Rub Not [place]] : hearing was normal [Oropharynx] : the oropharynx was normal [Neck Appearance] : the appearance of the neck was normal [Respiration, Rhythm And Depth] : normal respiratory rhythm and effort [Exaggerated Use Of Accessory Muscles For Inspiration] : no accessory muscle use [Heart Rate And Rhythm] : heart rate was normal and rhythm regular [Abnormal Walk] : normal gait [Involuntary Movements] : no involuntary movements were seen [Motor Tone] : muscle strength and tone were normal [Skin Color & Pigmentation] : normal skin color and pigmentation [] : no rash

## 2019-12-03 ENCOUNTER — RX RENEWAL (OUTPATIENT)
Age: 54
End: 2019-12-03

## 2019-12-05 ENCOUNTER — APPOINTMENT (OUTPATIENT)
Dept: ENDOCRINOLOGY | Facility: CLINIC | Age: 54
End: 2019-12-05
Payer: MEDICAID

## 2019-12-05 VITALS — OXYGEN SATURATION: 98 % | HEIGHT: 67 IN | HEART RATE: 56 BPM | WEIGHT: 210 LBS | BODY MASS INDEX: 32.96 KG/M2

## 2019-12-05 PROCEDURE — 99214 OFFICE O/P EST MOD 30 MIN: CPT | Mod: 25

## 2019-12-05 PROCEDURE — 10005 FNA BX W/US GDN 1ST LES: CPT

## 2019-12-05 NOTE — PHYSICAL EXAM
[Alert] : alert [No Acute Distress] : no acute distress [Well Nourished] : well nourished [Well Developed] : well developed [Normal Sclera/Conjunctiva] : normal sclera/conjunctiva [PERRL] : pupils equal, round and reactive to light [EOMI] : extra ocular movement intact [No Proptosis] : no proptosis [Normal Outer Ear/Nose] : the ears and nose were normal in appearance [Normal TMs] : both tympanic membranes were normal [Normal Hearing] : hearing was normal [No Neck Mass] : no neck mass was observed [Supple] : the neck was supple [No Respiratory Distress] : no respiratory distress [Normal Rate and Effort] : normal respiratory rhythm and effort [Clear to Auscultation] : lungs were clear to auscultation bilaterally [Normal Rate] : heart rate was normal  [Normal S1, S2] : normal S1 and S2 [Regular Rhythm] : with a regular rhythm [Normal Bowel Sounds] : normal bowel sounds [Normal Gait] : normal gait [No Joint Swelling] : no joint swelling seen [No Clubbing, Cyanosis] : no clubbing  or cyanosis of the fingernails [Normal Strength/Tone] : muscle strength and tone were normal [No Rash] : no rash [No Skin Lesions] : no skin lesions [Normal Reflexes] : deep tendon reflexes were 2+ and symmetric [No Motor Deficits] : the motor exam was normal [No Tremors] : no tremors [Oriented x3] : oriented to person, place, and time [Normal Insight/Judgement] : insight and judgment were intact [Normal Affect] : the affect was normal [Normal Mood] : the mood was normal [Foot Ulcers] : no foot ulcers [de-identified] : left thyroid enlargement, nontender

## 2019-12-05 NOTE — HISTORY OF PRESENT ILLNESS
[FreeTextEntry1] : 52 yo F presents for follow up of DM and thyroid nodule and FNA \par \par S/p Gastric sleeve on September 21st 2018 \par Since then she has come off of most of her DM meds, currently on metformin and jardiance \par Reports 50lbs weight loss since 11/2018 \par \par HPI:\par \par Duration of Diabetes: 12+ \par Is patient on Insulin? No \par If yes, how long on insulin?\par 4/2019 HbA1c 7.3%\par \par List Current Medications for Glycemic control and the doses:\par 1- Metformin 1000 mg BID \par 2- Jardiance 10mg daily \par \par Other Meds\par --------------\par Lipitor 80 mg daily\par Losartan 50 mg daily \par Plavix ( last dose 12/4/19)\par Nifedipine \par Metoprolol \par \par SMBG (self monitored blood glucose) readings: \par - Name of glucometer: \par - How often does the patient check BG? three times daily  \par \par If detailed record is available, what is the range of most of the BG readings?\par - Before Breakfast: 100-120\par - Before lunch: 170-180\par - Right after dinner:  180-200s\par \par Does patient get Hypoglycemic episodes? None \par Reports twice monthly she awaken with BG 70s, reports feeling weak. After eating candy reports improvement in symptoms.  \par \par \par Diabetic Complications: Is patient aware of having any of those complications?\par - Eyes: Retinopathy? Yes and low grade glaucoma started on latanoprost \par  	When was the last fully dilated eye exam?11/2019, s/p laser therapy on left eye \par - Feet: 	Neuropathy? mild intermittent in hands and feet \par  	Foot Ulcers? None \par 	When was the last time patient saw a Podiatrist?  8/2019\par - Kidneys: Nephropathy? 4/2019 GFR 89 \par \par Diet: review patient's diet: \par She is currently on bariatric diet and lost 50 lbs since 2018 after her sleeve \par \par Exercise: review patient exercise habits: Walking ( Increased more than before) \par \par Symptoms: Write any symptoms, concerns and issues bothering the patient which have not be addressed above: \par No polyuria or polydipsia\par + blurry vision \par \par \par Thyroid Nodule\par ------------------\par -Previous visit found to have a 1.5 cm solid cystic low suspicion nodule present, on the left \par -On 10/2019- nodule was 1.27 x 1.17 x 1.41 cm, other cysts present as well \par -Reported that there was an incidental finding of thyroid nodule on her cervical MRI\par -Denied any compressive symptoms. Reports chronic constipation and fatigue. Does not report heat intolerance, palpitation, tremors or changes in hair and nails\par -Reported that her sister and paternal aunt has history of thyroid cancer \par

## 2019-12-05 NOTE — PROCEDURE
[Fine Needle Aspiration] : fine needle aspiration ~T ~C was performed [Patient] : the patient [Consent Obtained] : written consent was obtained prior to the procedure and is detailed in the patient's record [Risks] : risks [Benefits] : benefits [Ethyl Chloride] : ethyl chloride [Alcohol] : alcohol [25 gauge 1.5 inch] : A 25 gauge 1.5 inch needle was used [3 Passes] : 3 passes were made through the mass [Supine] : the patient was placed in the supine position with the neck extended as tolerated [Sent to Histology] : the specimens were prepared in the usual manner and sent to cytopathologist [Ultrasonic Guidance] : ultrasound guidance was employed [Tolerated Well] : the patient tolerated the procedure well [No Complications] : there were no complications [de-identified] : As patient on plavix, recommend cold compress to neck if hematoma in noted  [FreeTextEntry1] : \par  [de-identified] : left lower pole nodule

## 2019-12-05 NOTE — REVIEW OF SYSTEMS
[Fatigue] : fatigue [Blurry Vision] : blurred vision [Constipation] : constipation [Decreased Appetite] : appetite not decreased [Recent Weight Gain (___ Lbs)] : no recent weight gain [Recent Weight Loss (___ Lbs)] : no recent weight loss [Visual Field Defect] : no visual field defect [Dry Eyes] : no dryness of the eyes [Eyes Itch] : no itching of the eyes [Dysphonia] : no dysphonia [Dysphagia] : no dysphagia [Neck Pain] : no neck pain [Nasal Congestion] : no nasal congestion [Chest Pain] : no chest pain [Palpitations] : no palpitations [Heart Rate Is Slow] : the heart rate was not slow [Heart Rate Is Fast] : the heart rate was not fast [Shortness Of Breath] : no shortness of breath [Wheezing] : no wheezing was heard [Cough] : no cough [SOB on Exertion] : no shortness of breath during exertion [Nausea] : no nausea [Vomiting] : no vomiting was observed [Polyuria] : no polyuria [Irregular Menses] : regular menses [Joint Pain] : no joint pain [Joint Stiffness] : no joint stiffness [Muscle Cramps] : no muscle cramps [Muscle Weakness] : no muscle weakness [Acanthosis] : no acanthosis  [Hirsutism] : no hirsutism [Acne] : no acne [Hair Loss] : no hair loss [Headache] : no headaches [Tremors] : no tremors [Depression] : no depression [Anxiety] : no anxiety [Polydipsia] : no polydipsia [Galactorrhea] : no galactorrhea  [Easy Bleeding] : no ~M tendency for easy bleeding [Easy Bruising] : no tendency for easy bruising [Swelling] : no swelling

## 2019-12-05 NOTE — PROCEDURE
[Fine Needle Aspiration] : fine needle aspiration ~T ~C was performed [Patient] : the patient [Consent Obtained] : written consent was obtained prior to the procedure and is detailed in the patient's record [Risks] : risks [Benefits] : benefits [Ethyl Chloride] : ethyl chloride [Supine] : the patient was placed in the supine position with the neck extended as tolerated [3 Passes] : 3 passes were made through the mass [Alcohol] : alcohol [25 gauge 1.5 inch] : A 25 gauge 1.5 inch needle was used [Ultrasonic Guidance] : ultrasound guidance was employed [Sent to Histology] : the specimens were prepared in the usual manner and sent to cytopathologist [Tolerated Well] : the patient tolerated the procedure well [No Complications] : there were no complications [de-identified] : left lower pole nodule [de-identified] : As patient on plavix, recommend cold compress to neck if hematoma in noted  [FreeTextEntry1] : \par

## 2019-12-05 NOTE — PHYSICAL EXAM
[Alert] : alert [No Acute Distress] : no acute distress [Well Nourished] : well nourished [Well Developed] : well developed [Normal Sclera/Conjunctiva] : normal sclera/conjunctiva [PERRL] : pupils equal, round and reactive to light [EOMI] : extra ocular movement intact [No Proptosis] : no proptosis [Normal Outer Ear/Nose] : the ears and nose were normal in appearance [Normal Hearing] : hearing was normal [Normal TMs] : both tympanic membranes were normal [No Neck Mass] : no neck mass was observed [Supple] : the neck was supple [No Respiratory Distress] : no respiratory distress [Normal Rate and Effort] : normal respiratory rhythm and effort [Clear to Auscultation] : lungs were clear to auscultation bilaterally [Normal Rate] : heart rate was normal  [Normal S1, S2] : normal S1 and S2 [Regular Rhythm] : with a regular rhythm [Normal Bowel Sounds] : normal bowel sounds [Normal Gait] : normal gait [No Joint Swelling] : no joint swelling seen [No Clubbing, Cyanosis] : no clubbing  or cyanosis of the fingernails [Normal Strength/Tone] : muscle strength and tone were normal [No Rash] : no rash [No Skin Lesions] : no skin lesions [Normal Reflexes] : deep tendon reflexes were 2+ and symmetric [No Motor Deficits] : the motor exam was normal [No Tremors] : no tremors [Oriented x3] : oriented to person, place, and time [Normal Insight/Judgement] : insight and judgment were intact [Normal Affect] : the affect was normal [Normal Mood] : the mood was normal [Foot Ulcers] : no foot ulcers [de-identified] : left thyroid enlargement, nontender

## 2019-12-05 NOTE — REVIEW OF SYSTEMS
[Fatigue] : fatigue [Blurry Vision] : blurred vision [Constipation] : constipation [Decreased Appetite] : appetite not decreased [Recent Weight Gain (___ Lbs)] : no recent weight gain [Recent Weight Loss (___ Lbs)] : no recent weight loss [Visual Field Defect] : no visual field defect [Dry Eyes] : no dryness of the eyes [Eyes Itch] : no itching of the eyes [Dysphagia] : no dysphagia [Dysphonia] : no dysphonia [Neck Pain] : no neck pain [Nasal Congestion] : no nasal congestion [Chest Pain] : no chest pain [Palpitations] : no palpitations [Heart Rate Is Slow] : the heart rate was not slow [Heart Rate Is Fast] : the heart rate was not fast [Shortness Of Breath] : no shortness of breath [Wheezing] : no wheezing was heard [Cough] : no cough [SOB on Exertion] : no shortness of breath during exertion [Nausea] : no nausea [Vomiting] : no vomiting was observed [Polyuria] : no polyuria [Irregular Menses] : regular menses [Joint Pain] : no joint pain [Joint Stiffness] : no joint stiffness [Muscle Weakness] : no muscle weakness [Muscle Cramps] : no muscle cramps [Acanthosis] : no acanthosis  [Acne] : no acne [Hirsutism] : no hirsutism [Hair Loss] : no hair loss [Headache] : no headaches [Tremors] : no tremors [Depression] : no depression [Anxiety] : no anxiety [Polydipsia] : no polydipsia [Galactorrhea] : no galactorrhea  [Easy Bleeding] : no ~M tendency for easy bleeding [Easy Bruising] : no tendency for easy bruising [Swelling] : no swelling

## 2019-12-05 NOTE — ASSESSMENT
[FreeTextEntry1] : 53 year old female with history of uncontrolled DM Type 2, HLD, HTN who presented for follow up and FNA of left thyroid nodule. \par \par Uncontrolled DM2\par 4/2019 A1c 7.3%, reports PCP performed A1c after last visit which was 8.0%\par Will obtain A1c today \par Continue Metformin 1000 mg BID \par Continue Jardiance 10 mg daily, added at las visit for better glycemic control and weight loss \par Continue SMBGs 2-3 times per day\par Bariatric diet \par \par Thyroid nodule\par  2018 TSH 2.1\par Strong family history of thyroid cancer in sister and paternal aunt \par 10/2019 thyroid sono 1.27 x1.17 x 1.41cm solid left lower pole nodule \par will obtain TFT today \par s/p  FNA today and [facility]ir[facility], will call with results \par \par HLD\par Continue Atorvastatin 40 mg daily \par \par \par HTN\par Continue Losartan, Nifedipine and Metoprolol\par \par \par RTC in 3 months.

## 2019-12-06 ENCOUNTER — OTHER (OUTPATIENT)
Age: 54
End: 2019-12-06

## 2019-12-06 LAB
ANION GAP SERPL CALC-SCNC: 14 MMOL/L
BUN SERPL-MCNC: 11 MG/DL
CALCIUM SERPL-MCNC: 9.7 MG/DL
CHLORIDE SERPL-SCNC: 105 MMOL/L
CHOLEST SERPL-MCNC: 127 MG/DL
CHOLEST/HDLC SERPL: 2.4 RATIO
CO2 SERPL-SCNC: 26 MMOL/L
CREAT SERPL-MCNC: 0.67 MG/DL
ESTIMATED AVERAGE GLUCOSE: 209 MG/DL
GLUCOSE SERPL-MCNC: 134 MG/DL
HBA1C MFR BLD HPLC: 8.9 %
HDLC SERPL-MCNC: 53 MG/DL
LDLC SERPL CALC-MCNC: 59 MG/DL
POTASSIUM SERPL-SCNC: 3.9 MMOL/L
SODIUM SERPL-SCNC: 144 MMOL/L
T4 FREE SERPL-MCNC: 1.4 NG/DL
TRIGL SERPL-MCNC: 77 MG/DL
TSH SERPL-ACNC: 1.67 UIU/ML

## 2019-12-10 NOTE — STROKE CODE NOTE - STROKE TEAM ASSESSMENT
03-Oct-2017 09:22 Partial Purse String (Intermediate) Text: Given the location of the defect and the characteristics of the surrounding skin an intermediate purse string closure was deemed most appropriate.  Undermining was performed circumfirentially around the surgical defect.  A purse string suture was then placed and tightened. Wound tension only allowed a partial closure of the circular defect.

## 2019-12-12 LAB — FNA, THYROID: NORMAL

## 2019-12-19 ENCOUNTER — RX RENEWAL (OUTPATIENT)
Age: 54
End: 2019-12-19

## 2019-12-19 ENCOUNTER — TRANSCRIPTION ENCOUNTER (OUTPATIENT)
Age: 54
End: 2019-12-19

## 2019-12-19 ENCOUNTER — OTHER (OUTPATIENT)
Age: 54
End: 2019-12-19

## 2019-12-20 ENCOUNTER — APPOINTMENT (OUTPATIENT)
Dept: OPHTHALMOLOGY | Facility: CLINIC | Age: 54
End: 2019-12-20

## 2019-12-31 ENCOUNTER — APPOINTMENT (OUTPATIENT)
Dept: ELECTROPHYSIOLOGY | Facility: CLINIC | Age: 54
End: 2019-12-31
Payer: MEDICAID

## 2019-12-31 PROCEDURE — 93298 REM INTERROG DEV EVAL SCRMS: CPT

## 2019-12-31 PROCEDURE — 93299: CPT

## 2020-01-03 ENCOUNTER — OUTPATIENT (OUTPATIENT)
Dept: OUTPATIENT SERVICES | Facility: HOSPITAL | Age: 55
LOS: 1 days | End: 2020-01-03
Payer: MEDICAID

## 2020-01-03 VITALS
RESPIRATION RATE: 15 BRPM | OXYGEN SATURATION: 99 % | HEIGHT: 65 IN | HEART RATE: 63 BPM | DIASTOLIC BLOOD PRESSURE: 86 MMHG | SYSTOLIC BLOOD PRESSURE: 150 MMHG | WEIGHT: 210.1 LBS | TEMPERATURE: 98 F

## 2020-01-03 DIAGNOSIS — Z98.89 OTHER SPECIFIED POSTPROCEDURAL STATES: Chronic | ICD-10-CM

## 2020-01-03 DIAGNOSIS — E11.9 TYPE 2 DIABETES MELLITUS WITHOUT COMPLICATIONS: ICD-10-CM

## 2020-01-03 DIAGNOSIS — Z86.79 PERSONAL HISTORY OF OTHER DISEASES OF THE CIRCULATORY SYSTEM: ICD-10-CM

## 2020-01-03 DIAGNOSIS — Z98.84 BARIATRIC SURGERY STATUS: Chronic | ICD-10-CM

## 2020-01-03 DIAGNOSIS — I66.9 OCCLUSION AND STENOSIS OF UNSPECIFIED CEREBRAL ARTERY: ICD-10-CM

## 2020-01-03 DIAGNOSIS — I10 ESSENTIAL (PRIMARY) HYPERTENSION: ICD-10-CM

## 2020-01-03 DIAGNOSIS — Z01.818 ENCOUNTER FOR OTHER PREPROCEDURAL EXAMINATION: ICD-10-CM

## 2020-01-03 DIAGNOSIS — Z95.818 PRESENCE OF OTHER CARDIAC IMPLANTS AND GRAFTS: Chronic | ICD-10-CM

## 2020-01-03 DIAGNOSIS — Z98.890 OTHER SPECIFIED POSTPROCEDURAL STATES: Chronic | ICD-10-CM

## 2020-01-03 LAB
ANION GAP SERPL CALC-SCNC: 13 MMOL/L — SIGNIFICANT CHANGE UP (ref 5–17)
BLD GP AB SCN SERPL QL: NEGATIVE — SIGNIFICANT CHANGE UP
BUN SERPL-MCNC: 13 MG/DL — SIGNIFICANT CHANGE UP (ref 7–23)
CALCIUM SERPL-MCNC: 9.6 MG/DL — SIGNIFICANT CHANGE UP (ref 8.4–10.5)
CHLORIDE SERPL-SCNC: 100 MMOL/L — SIGNIFICANT CHANGE UP (ref 96–108)
CO2 SERPL-SCNC: 25 MMOL/L — SIGNIFICANT CHANGE UP (ref 22–31)
CREAT SERPL-MCNC: 0.69 MG/DL — SIGNIFICANT CHANGE UP (ref 0.5–1.3)
GLUCOSE SERPL-MCNC: 138 MG/DL — HIGH (ref 70–99)
HBA1C BLD-MCNC: 8.4 % — HIGH (ref 4–5.6)
HCT VFR BLD CALC: 43.7 % — SIGNIFICANT CHANGE UP (ref 34.5–45)
HGB BLD-MCNC: 13.2 G/DL — SIGNIFICANT CHANGE UP (ref 11.5–15.5)
MCHC RBC-ENTMCNC: 23.8 PG — LOW (ref 27–34)
MCHC RBC-ENTMCNC: 30.2 GM/DL — LOW (ref 32–36)
MCV RBC AUTO: 78.7 FL — LOW (ref 80–100)
PLATELET # BLD AUTO: 365 K/UL — SIGNIFICANT CHANGE UP (ref 150–400)
POTASSIUM SERPL-MCNC: 3.8 MMOL/L — SIGNIFICANT CHANGE UP (ref 3.5–5.3)
POTASSIUM SERPL-SCNC: 3.8 MMOL/L — SIGNIFICANT CHANGE UP (ref 3.5–5.3)
RBC # BLD: 5.55 M/UL — HIGH (ref 3.8–5.2)
RBC # FLD: 16.1 % — HIGH (ref 10.3–14.5)
RH IG SCN BLD-IMP: POSITIVE — SIGNIFICANT CHANGE UP
SODIUM SERPL-SCNC: 138 MMOL/L — SIGNIFICANT CHANGE UP (ref 135–145)
WBC # BLD: 7.93 K/UL — SIGNIFICANT CHANGE UP (ref 3.8–10.5)
WBC # FLD AUTO: 7.93 K/UL — SIGNIFICANT CHANGE UP (ref 3.8–10.5)

## 2020-01-03 PROCEDURE — 83036 HEMOGLOBIN GLYCOSYLATED A1C: CPT

## 2020-01-03 PROCEDURE — 86901 BLOOD TYPING SEROLOGIC RH(D): CPT

## 2020-01-03 PROCEDURE — G0463: CPT

## 2020-01-03 PROCEDURE — 86850 RBC ANTIBODY SCREEN: CPT

## 2020-01-03 PROCEDURE — 80048 BASIC METABOLIC PNL TOTAL CA: CPT

## 2020-01-03 PROCEDURE — 86900 BLOOD TYPING SEROLOGIC ABO: CPT

## 2020-01-03 PROCEDURE — 85027 COMPLETE CBC AUTOMATED: CPT

## 2020-01-03 RX ORDER — CLOPIDOGREL BISULFATE 75 MG/1
150 TABLET, FILM COATED ORAL
Qty: 0 | Refills: 0 | DISCHARGE

## 2020-01-03 RX ORDER — INSULIN LISPRO 100/ML
10 VIAL (ML) SUBCUTANEOUS
Qty: 0 | Refills: 0 | DISCHARGE

## 2020-01-03 NOTE — H&P PST ADULT - NEUROLOGICAL DETAILS
responds to verbal commands/cranial nerves intact/responds to pain/alert and oriented x 3/hand  weaker on left arm alert and oriented x 3/responds to verbal commands/responds to pain/cranial nerves intact

## 2020-01-03 NOTE — H&P PST ADULT - NSICDXPROBLEM_GEN_ALL_CORE_FT
PROBLEM DIAGNOSES  Problem: H/O cerebral artery stenosis  Assessment and Plan: PROBLEM DIAGNOSES  Problem: H/O cerebral artery stenosis  Assessment and Plan: Scheduled Cerebral angiogram on 1/9/20  Pre-op instructions given to pt, including chlorhexidine soap  Lab work [place] [facility] [place]  Pt instructed to continue Plavix pre-op, confirmed plan with Dr. Jules.    Problem: HTN (hypertension)  Assessment and Plan: Pt to continue BP medication    Problem: DM (diabetes mellitus)  Assessment and Plan: Pt instructed to hold Jardiance 3 days pre-op, last dose 1/5 and hold Metformin 1/7 pm.  Hga1c sent at [facility]  [facility] DOS

## 2020-01-03 NOTE — H&P PST ADULT - NSICDXFAMILYHX_GEN_ALL_CORE_FT
FAMILY HISTORY:  Family history of coronary artery disease  Family history of MI (myocardial infarction)    Sibling  Still living? Unknown  Family history of diabetes mellitus, Age at diagnosis: Age Unknown  Family history of hypertension, Age at diagnosis: Age Unknown

## 2020-01-03 NOTE — H&P PST ADULT - MUSCULOSKELETAL
details… detailed exam ROM intact/no calf tenderness/no joint swelling/normal strength/no joint erythema/no joint warmth

## 2020-01-03 NOTE — H&P PST ADULT - HISTORY OF PRESENT ILLNESS
53 y/o obese female with a PMHx of HTN, HLD, DM2, glaucoma, sarcoidosis, chronic back pain secondary to herniated discs in the cervical and lumbar spine, stroke (embolism Rt MCA 10/03/17), c/o intermittent mild headaches, relieved with rest. Pt also reports blurry vision due to glaucoma. Recent diagnotic imaging revealed occlusion of the mid to distal right M1 segment of the middle cerebral artery with nonvisualization of the distal M2 branches, evaluated by Dr. Jules. Presents to PST for a scheduled cerebral angio on 1/9/2020.

## 2020-01-03 NOTE — H&P PST ADULT - NSICDXPASTSURGICALHX_GEN_ALL_CORE_FT
PAST SURGICAL HISTORY:  H/O hand surgery right, tendon repair, FEB 2016    S/P eye surgery left 10/2019    S/P laparoscopic sleeve gastrectomy 9/2018    Status post placement of implantable loop recorder 10/30/17, pt reports no events, next reading July 2019

## 2020-01-03 NOTE — H&P PST ADULT - NEGATIVE GASTROINTESTINAL SYMPTOMS
no abdominal pain/no melena/no diarrhea/no change in bowel habits/no flatulence/no hematochezia/no nausea/no vomiting/no constipation

## 2020-01-03 NOTE — H&P PST ADULT - RS GEN PE MLT RESP DETAILS PC
no rales/breath sounds equal/no rhonchi no rhonchi/no wheezes/clear to auscultation bilaterally/breath sounds equal/respirations non-labored/no rales

## 2020-01-03 NOTE — H&P PST ADULT - NSICDXPASTMEDICALHX_GEN_ALL_CORE_FT
PAST MEDICAL HISTORY:  Chronic back pain     DM (diabetes mellitus) 2    Hiatal hernia     HLD (hyperlipidemia)     HTN (hypertension)     Occlusion and stenosis of cerebral artery     Sarcoidosis on CT chest by Dr Mercer 2018-unchanged    Stroke 10/03/17  s/p left side weakness, resolved PAST MEDICAL HISTORY:  Chronic back pain     DM (diabetes mellitus) 2    Hiatal hernia     HLD (hyperlipidemia)     HTN (hypertension)     Occlusion and stenosis of cerebral artery     Sarcoidosis on CT chest by Dr Mercer 2018-unchanged    Status post placement of implantable loop recorder last device check 11/22/19    Stroke 10/03/17  s/p left side weakness, resolved

## 2020-01-03 NOTE — H&P PST ADULT - OTHER CARE PROVIDERS
Dr Viral Mathew (neuro) [phone], Cardiologist Dr Kamari Rizvi [phone] last visit ~2yrs ago, Endo Dr Masood Wyatt [phone] last visit 08/24/18

## 2020-01-05 ENCOUNTER — FORM ENCOUNTER (OUTPATIENT)
Age: 55
End: 2020-01-05

## 2020-01-06 ENCOUNTER — OUTPATIENT (OUTPATIENT)
Dept: OUTPATIENT SERVICES | Facility: HOSPITAL | Age: 55
LOS: 1 days | End: 2020-01-06
Payer: MEDICAID

## 2020-01-06 ENCOUNTER — APPOINTMENT (OUTPATIENT)
Dept: NUCLEAR MEDICINE | Facility: HOSPITAL | Age: 55
End: 2020-01-06

## 2020-01-06 DIAGNOSIS — Z98.89 OTHER SPECIFIED POSTPROCEDURAL STATES: Chronic | ICD-10-CM

## 2020-01-06 DIAGNOSIS — I66.9 OCCLUSION AND STENOSIS OF UNSPECIFIED CEREBRAL ARTERY: ICD-10-CM

## 2020-01-06 DIAGNOSIS — Z98.890 OTHER SPECIFIED POSTPROCEDURAL STATES: Chronic | ICD-10-CM

## 2020-01-06 DIAGNOSIS — Z95.818 PRESENCE OF OTHER CARDIAC IMPLANTS AND GRAFTS: Chronic | ICD-10-CM

## 2020-01-06 DIAGNOSIS — Z98.84 BARIATRIC SURGERY STATUS: Chronic | ICD-10-CM

## 2020-01-06 PROCEDURE — 78830 RP LOCLZJ TUM SPECT W/CT 1: CPT | Mod: 26

## 2020-01-06 PROCEDURE — 78830 RP LOCLZJ TUM SPECT W/CT 1: CPT

## 2020-01-08 ENCOUNTER — OUTPATIENT (OUTPATIENT)
Dept: OUTPATIENT SERVICES | Facility: HOSPITAL | Age: 55
LOS: 1 days | End: 2020-01-08
Payer: MEDICAID

## 2020-01-08 ENCOUNTER — FORM ENCOUNTER (OUTPATIENT)
Age: 55
End: 2020-01-08

## 2020-01-08 ENCOUNTER — APPOINTMENT (OUTPATIENT)
Dept: NUCLEAR MEDICINE | Facility: HOSPITAL | Age: 55
End: 2020-01-08
Payer: MEDICAID

## 2020-01-08 DIAGNOSIS — I66.9 OCCLUSION AND STENOSIS OF UNSPECIFIED CEREBRAL ARTERY: ICD-10-CM

## 2020-01-08 DIAGNOSIS — Z98.84 BARIATRIC SURGERY STATUS: Chronic | ICD-10-CM

## 2020-01-08 DIAGNOSIS — Z95.818 PRESENCE OF OTHER CARDIAC IMPLANTS AND GRAFTS: Chronic | ICD-10-CM

## 2020-01-08 DIAGNOSIS — Z98.89 OTHER SPECIFIED POSTPROCEDURAL STATES: Chronic | ICD-10-CM

## 2020-01-08 DIAGNOSIS — Z98.890 OTHER SPECIFIED POSTPROCEDURAL STATES: Chronic | ICD-10-CM

## 2020-01-08 PROCEDURE — 78832 RP LOCLZJ TUM SPECT W/CT 2: CPT

## 2020-01-08 PROCEDURE — 78832 RP LOCLZJ TUM SPECT W/CT 2: CPT | Mod: 26

## 2020-01-09 ENCOUNTER — APPOINTMENT (OUTPATIENT)
Dept: NEUROLOGY | Facility: CLINIC | Age: 55
End: 2020-01-09
Payer: MEDICAID

## 2020-01-09 ENCOUNTER — OUTPATIENT (OUTPATIENT)
Dept: OUTPATIENT SERVICES | Facility: HOSPITAL | Age: 55
LOS: 1 days | End: 2020-01-09
Payer: MEDICAID

## 2020-01-09 DIAGNOSIS — I66.9 OCCLUSION AND STENOSIS OF UNSPECIFIED CEREBRAL ARTERY: ICD-10-CM

## 2020-01-09 DIAGNOSIS — Z98.89 OTHER SPECIFIED POSTPROCEDURAL STATES: Chronic | ICD-10-CM

## 2020-01-09 DIAGNOSIS — Z98.890 OTHER SPECIFIED POSTPROCEDURAL STATES: Chronic | ICD-10-CM

## 2020-01-09 DIAGNOSIS — Z98.84 BARIATRIC SURGERY STATUS: Chronic | ICD-10-CM

## 2020-01-09 DIAGNOSIS — Z95.818 PRESENCE OF OTHER CARDIAC IMPLANTS AND GRAFTS: Chronic | ICD-10-CM

## 2020-01-09 LAB
ANION GAP SERPL CALC-SCNC: 11 MMOL/L — SIGNIFICANT CHANGE UP (ref 5–17)
BASOPHILS # BLD AUTO: 0.04 K/UL — SIGNIFICANT CHANGE UP (ref 0–0.2)
BASOPHILS NFR BLD AUTO: 0.6 % — SIGNIFICANT CHANGE UP (ref 0–2)
BUN SERPL-MCNC: 10 MG/DL — SIGNIFICANT CHANGE UP (ref 7–23)
CALCIUM SERPL-MCNC: 9.9 MG/DL — SIGNIFICANT CHANGE UP (ref 8.4–10.5)
CHLORIDE SERPL-SCNC: 107 MMOL/L — SIGNIFICANT CHANGE UP (ref 96–108)
CO2 SERPL-SCNC: 25 MMOL/L — SIGNIFICANT CHANGE UP (ref 22–31)
CREAT SERPL-MCNC: 0.68 MG/DL — SIGNIFICANT CHANGE UP (ref 0.5–1.3)
EOSINOPHIL # BLD AUTO: 0.04 K/UL — SIGNIFICANT CHANGE UP (ref 0–0.5)
EOSINOPHIL NFR BLD AUTO: 0.6 % — SIGNIFICANT CHANGE UP (ref 0–6)
GLUCOSE BLDC GLUCOMTR-MCNC: 122 MG/DL — HIGH (ref 70–99)
GLUCOSE BLDC GLUCOMTR-MCNC: 143 MG/DL — HIGH (ref 70–99)
GLUCOSE SERPL-MCNC: 147 MG/DL — HIGH (ref 70–99)
HCT VFR BLD CALC: 42.9 % — SIGNIFICANT CHANGE UP (ref 34.5–45)
HGB BLD-MCNC: 12.7 G/DL — SIGNIFICANT CHANGE UP (ref 11.5–15.5)
IMM GRANULOCYTES NFR BLD AUTO: 0.3 % — SIGNIFICANT CHANGE UP (ref 0–1.5)
LYMPHOCYTES # BLD AUTO: 1.53 K/UL — SIGNIFICANT CHANGE UP (ref 1–3.3)
LYMPHOCYTES # BLD AUTO: 22.3 % — SIGNIFICANT CHANGE UP (ref 13–44)
MCHC RBC-ENTMCNC: 23.5 PG — LOW (ref 27–34)
MCHC RBC-ENTMCNC: 29.6 GM/DL — LOW (ref 32–36)
MCV RBC AUTO: 79.4 FL — LOW (ref 80–100)
MONOCYTES # BLD AUTO: 0.46 K/UL — SIGNIFICANT CHANGE UP (ref 0–0.9)
MONOCYTES NFR BLD AUTO: 6.7 % — SIGNIFICANT CHANGE UP (ref 2–14)
NEUTROPHILS # BLD AUTO: 4.77 K/UL — SIGNIFICANT CHANGE UP (ref 1.8–7.4)
NEUTROPHILS NFR BLD AUTO: 69.5 % — SIGNIFICANT CHANGE UP (ref 43–77)
NRBC # BLD: 0 /100 WBCS — SIGNIFICANT CHANGE UP (ref 0–0)
PLATELET # BLD AUTO: 315 K/UL — SIGNIFICANT CHANGE UP (ref 150–400)
POTASSIUM SERPL-MCNC: 3.8 MMOL/L — SIGNIFICANT CHANGE UP (ref 3.5–5.3)
POTASSIUM SERPL-SCNC: 3.8 MMOL/L — SIGNIFICANT CHANGE UP (ref 3.5–5.3)
RBC # BLD: 5.4 M/UL — HIGH (ref 3.8–5.2)
RBC # FLD: 15.5 % — HIGH (ref 10.3–14.5)
SODIUM SERPL-SCNC: 143 MMOL/L — SIGNIFICANT CHANGE UP (ref 135–145)
WBC # BLD: 6.86 K/UL — SIGNIFICANT CHANGE UP (ref 3.8–10.5)
WBC # FLD AUTO: 6.86 K/UL — SIGNIFICANT CHANGE UP (ref 3.8–10.5)

## 2020-01-09 PROCEDURE — 36224 PLACE CATH CAROTD ART: CPT

## 2020-01-09 PROCEDURE — 85027 COMPLETE CBC AUTOMATED: CPT

## 2020-01-09 PROCEDURE — C1769: CPT

## 2020-01-09 PROCEDURE — 36227 PLACE CATH XTRNL CAROTID: CPT

## 2020-01-09 PROCEDURE — C1887: CPT

## 2020-01-09 PROCEDURE — 36224 PLACE CATH CAROTD ART: CPT | Mod: 50

## 2020-01-09 PROCEDURE — 80048 BASIC METABOLIC PNL TOTAL CA: CPT

## 2020-01-09 PROCEDURE — C1894: CPT

## 2020-01-09 PROCEDURE — 36226 PLACE CATH VERTEBRAL ART: CPT

## 2020-01-09 PROCEDURE — 82962 GLUCOSE BLOOD TEST: CPT

## 2020-01-09 PROCEDURE — 36226 PLACE CATH VERTEBRAL ART: CPT | Mod: 50

## 2020-01-09 PROCEDURE — 36227 PLACE CATH XTRNL CAROTID: CPT | Mod: 50

## 2020-01-09 RX ORDER — SODIUM CHLORIDE 9 MG/ML
1000 INJECTION INTRAMUSCULAR; INTRAVENOUS; SUBCUTANEOUS
Refills: 0 | Status: DISCONTINUED | OUTPATIENT
Start: 2020-01-09 | End: 2020-02-04

## 2020-01-09 NOTE — CHART NOTE - NSCHARTNOTEFT_GEN_A_CORE
Interventional Neuro Radiology  Pre-Procedure Note PA-C    This is a 54y ____ hand dominant Female      HPI:      Allergies: penicillin (Rash)      PAST MEDICAL & SURGICAL HISTORY:  Status post placement of implantable loop recorder: last device check 11/22/19  Occlusion and stenosis of cerebral artery  Stroke: 10/03/17  s/p left side weakness, resolved  HLD (hyperlipidemia)  Hiatal hernia  Sarcoidosis: on CT chest by Dr Mercer 2018-unchanged  DM (diabetes mellitus): 2  HTN (hypertension)  Chronic back pain  S/P eye surgery: left 10/2019  S/P laparoscopic sleeve gastrectomy: 9/2018  Status post placement of implantable loop recorder: 10/30/17, pt reports no events, next reading July 2019  H/O hand surgery: right, tendon repair, FEB 2016      Social History:     FAMILY HISTORY:  Family history of MI (myocardial infarction)  Family history of coronary artery disease  Family history of diabetes mellitus (Sibling)  Family history of hypertension (Sibling)      Current Medications: sodium chloride 0.9%. 1000 milliLiter(s) IV Continuous <Continuous>Complete Blood Count (01.03.20 @ 23:11)    WBC Count: 7.93 K/uL    RBC Count: 5.55 M/uL    Hemoglobin: 13.2 g/dL    Hematocrit: 43.7 %    Mean Cell Volume: 78.7 fl    Mean Cell Hemoglobin: 23.8 pg    Mean Cell Hemoglobin Conc: 30.2 gm/dL    Red Cell Distrib Width: 16.1 %    Platelet Count - Automated: 365 K/uL                    Blood Bank:       Assessment/Plan:     This is a 54y  year old right  hand dominant Female  presents with   Patient presents to neuro-IR for selective cerebral angiography.   Procedure, goals, risks, benefits and alternatives  were discussed with patient and patient's family.  All questions were answered.  Risks include but are not limited to stroke, vessel injury, hemorrhage, and or right  groin hematoma.  Patient demonstrates understanding  of all risks involved with this procedure and wishes to continue.   Appropriate  content was obtained from patient and consent is in the patient's chart. Interventional Neuro Radiology  Pre-Procedure Note PA-C    This is a 54 year old right hand dominant Female            Allergies: penicillin (Rash)  Status post placement of implantable loop recorder: last device check 11/22/19  Occlusion and stenosis of cerebral artery  Stroke: 10/03/17  s/p left side weakness, resolved  HLD (hyperlipidemia)  Hiatal hernia  Sarcoidosis: on CT chest by Dr Mercer 2018-unchanged  DM (diabetes mellitus): 2  HTN (hypertension)  Chronic back pain  S/P eye surgery: left 10/2019  S/P laparoscopic sleeve gastrectomy: 9/2018  Status post placement of implantable loop recorder: 10/30/17, pt reports no events, next reading July 2019  H/O hand surgery: right, tendon repair, FEB 2016    Social History:   FAMILY HISTORY:  Family history of MI (myocardial infarction)  Family history of coronary artery disease  Family history of diabetes mellitus (Sibling)  Family history of hypertension (Sibling)    Current Medications: sodium chloride 0.9%. 1000 milliLiter(s) IV Continuous <Continuous>Complete Blood Count (01.03.20 @ 23:11)    WBC Count: 7.93 K/uL    Hemoglobin: 13.2 g/dL    Hematocrit: 43.7 %    Platelet Count - Automated: 365 K/uL    Basic Metabolic Panel (01.03.20 @ 18:12)    Sodium, Serum: 138 mmol/L    Potassium, Serum: 3.8 mmol/L    Chloride, Serum: 100 mmol/L    Carbon Dioxide, Serum: 25 mmol/L    Anion Gap, Serum: 13 mmol/L    Blood Urea Nitrogen, Serum: 13 mg/dL    Creatinine, Serum: 0.69 mg/dL    Glucose, Serum: 138 mg/dL      Blood Bank:       Assessment/Plan:     This is a 54y  year old right  hand dominant Female  presents with   Patient presents to neuro-IR for selective cerebral angiography.   Procedure, goals, risks, benefits and alternatives  were discussed with patient and patient's family.  All questions were answered.  Risks include but are not limited to stroke, vessel injury, hemorrhage, and or right  groin hematoma.  Patient demonstrates understanding  of all risks involved with this procedure and wishes to continue.   Appropriate  content was obtained from patient and consent is in the patient's chart.

## 2020-01-09 NOTE — CHART NOTE - NSCHARTNOTEFT_GEN_A_CORE
Interventional Neuro- Radiology   Procedure Note PA-C    Procedure: Selective Cerebral Angiography   Pre- Procedure Diagnosis:  Post- Procedure Diagnosis:    : Dr Tutu Jules  Physician Assistant: Dahlia Chopra PA-C    Nurse:                   Leonor Jordan RN  Radiologic Tech:    Inderjit Scales T  Anesthesiologist:   Dr Mika Jefferson  Sheath:                  4 Bulgarian short sheath      I/Os: EBL less than 10cc  IV fluids: 300cc  Urine output due to void  Contrast Omnipaque 240   86cc                 Vitals: /82        HR  82    Spo2 100%        Preliminary Report:  Using a 4 Bulgarian short sheath to the right groin under MAC sedation via left vertebral artery, left internal carotid artery, left external carotid artery, right vertebral artery, right internal carotid artery, right external carotid artery a selective cerebral angiography was performed and demonstrated                       Official note to follow.  Patient tolerated procedure well, hemodynamically stable, no change in neurological status compared to baseline. Results discussed with patient and patient's family. Right groin sheath was removed, manual compression held to hemostasis for 20 minutes, no active bleeding, no hematoma, quick clot and safeguard balloon dressing applied at 11:15am. Disposition recovery room first floor.

## 2020-01-13 DIAGNOSIS — I66.01 OCCLUSION AND STENOSIS OF RIGHT MIDDLE CEREBRAL ARTERY: ICD-10-CM

## 2020-01-17 ENCOUNTER — APPOINTMENT (OUTPATIENT)
Dept: NEUROLOGY | Facility: CLINIC | Age: 55
End: 2020-01-17
Payer: MEDICAID

## 2020-01-17 VITALS
WEIGHT: 210 LBS | BODY MASS INDEX: 32.96 KG/M2 | HEART RATE: 58 BPM | DIASTOLIC BLOOD PRESSURE: 92 MMHG | HEIGHT: 67 IN | SYSTOLIC BLOOD PRESSURE: 167 MMHG

## 2020-01-17 PROCEDURE — 99215 OFFICE O/P EST HI 40 MIN: CPT

## 2020-01-17 NOTE — HISTORY OF PRESENT ILLNESS
[FreeTextEntry1] : Ms. Kc 54 year old right handed  woman with vascular risk factors of HTN, DM II, HPLD and age is seen in the vascular neurology office for the evaluation and management of stroke, leading to hospital admission in 10/17. 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 she completed the MR Nova , Diamox scan and Cerebral catheter angiogram.\par \par \par From Dr Mathew's note: \par On 10/3/17, she was driving [place] train station, she was noted to have left facial droop and dysarthria - noted by her family members. Her driving was also reported to be "off" at that time as she was not able to keep the keep in the abbe. She was brought to [facility] for further evaluation by her family members. She was admitted to the hospital and was diagnosed to have stroke. She reports to be taking aspirin once a day since over a year or so - for ? HTN. She denies any personal history of DVT/PEs but reports to have family history of unprovoked DVT/PEs - unsure of the etiology of DVT/PEs. She denies any personal or family history of recurrent miscarriages. She also denies any family history of stroke at young age. After appropriate stroke work up, she was discharged home. Of note, she also reports to have off and on left facial numbness since last 1-2 months before her presentation to [facility] for stroke and was evaluated by Dr. Moses for the same but was not sure about the diagnosis. Of note, she reports to have snoring at night but denies any witnessed apneic episodes or excessive daytime sleepiness. Of note, she also reports to have off and on pulsatile tinnitus in the past but not recently. She is being followed by her cardiologist for ICM monitoring. \par \par Since her last office visit, she is diagnosed not to respond to clopidogrel based on P2Y12 testing and she was advised to increased the dose of clopidogrel but has not been able to undergo follow up blood testing due to personal reasons. \par \par She underwent gastric sleeve and hiatus hernia surgery and tolerated the procedure well. \par \par She presented to [facility] ED for left arm sharp pain without any weakness or sensory paresthesia lasting for about 2 days and improved with "pain medications". Currently she denies any focal neurological symptoms and reports to have current post-stroke mRS being 0. Denies any episodes of new or recurrent focal neurological symptoms concerning for stroke or TIA since her last office visit. Reports compliance with her medications and denies any significant side effects. \par \par As per the verbal report, she was evaluated by sleep disorder specialist and she reports that she is not diagnosed to have EBEN. \par \par ROS: All negative except documented in the history of present illness.\par \par Workup:\par CT brain (10/3/17): Early changes of ischemia in the right MCA distribution predominantly involving right basal ganglia\par MRI brain (10/17): Right MCA distribution embolic-looking infarct without significant hemorrhagic transformation, minimal leukoaraiosis\par CTA head and neck (10/17): No evidence of symptomatic intracranial or extracranial large vessel severe stenosis or occlusion\par LDL: 102 (10/17)\par HbA1c: 9.5 (10/17)\par Transthoracic echocardiogram: Normal mitral valve, probably normal aortic wall, normal left atrium, normal left systolic function, no segmental wall motion abnormalities, concentric left ventricular remodeling, agitated saline injection demonstrates no obvious intracardiac shunt\par Transesophageal echocardiogram: Minimal mitral regurgitation, normal left atrium, no left atrial or left atrial appendage thrombus, normal left ventricular systolic function, no segmental wall motion abnormalities, contrast injection demonstrates no evidence of a PFO\par Hypercoagulable workup: Unrevealing to my eye\par Prolonged cardiac monitor: ICM \par \par Home medications:\par Reviewed with the patient and updated as appropriate. \par

## 2020-01-17 NOTE — DISCUSSION/SUMMARY
[Antithrombotic therapy with ___] : antithrombotic therapy with  [unfilled] [Intensive Blood Pressure Control] : intensive blood pressure control [Patient encouraged to discuss with Primary MD] : I encouraged the patient to discuss these important issues with ~his/her~ primary care doctor [Lipid Lowering Therapy] : lipid lowering therapy [Goals and Counseling] : I have reviewed the goals of stroke risk factor modification. I counseled the patient on measures to reduce stroke risk, including the importance of medication compliance, risk factor control, exercise, healthy diet and avoidance of smoking. I reviewed stroke warning signs and symptoms and appropriate actions to take if such occur. [FreeTextEntry1] : Assessment:\par 53 years old right-handed woman with multiple vascular risk factors including age, HTN, DM II and HPLD is seen in the vascular neurology office for evaluation and management of stroke to hospital admission in 10/17. She was reported to have acute onset of left facial droop, dysarthria and troubled with a sense of directions in 10/17, prompting her presentation to [facility] for further evaluation. CT brain (10/17) on admission showed early changes of ischemia in the right MCA distribution. MRI brain (10/17) showed right MCA distribution embolic-looking infarct without significant hemorrhagic transformation. CTA head and neck (10/17) is concerning for possible partial recanalization of an embolus involving distal MCA M1 bifurcation/proximal M2s vs intracranial atherosclerosis to my eye but did not show any other significant intracranial or extracranial cerebral large vessel severe stenosis or occlusion. Transesophageal echocardiogram did not show any obvious structural cardiac source of embolism nor showed any evidence of a PFO. Hypercoagulable workup is unrevealing and no evidence of primary hypercoagulable state as the evaluation by hematologist. She is currently undergoing prolonged cardiac monitoring with ICM. Sleep study did not show any evidence of obstructive sleep apnea. MRA head (1/9/18) is reported to show "high-grade stenosis at right MCA M1 bifurcation/proximal M2 segments, and moderate stenosis of the basilar artery". MRA head and neck with MRA NOVA confirmed right MCA distal M1 severe to critical stenosis.\par \par Impression:\par Cerebral embolism with cerebral infarction. Right MCA distribution embolic - stroke - likely etiology symptomatic intracranial atherosclerosis, leading to artery to artery embolism \par Clopidogrel non-responder \par \par Plan:\par - Clopidogrel 150 mg once a day (clopidogrel non-responder as her P2Y12 was noted to be >200 while being on clopidogrel not able to get Aggrenox approved due to medical insurance denial and consider aspirin failure) for secondary prevention,\par Recheck P2Y12 to determine optimal platelet inhibition with clopidogrel. Also advised to avoid medications like omeprazole due to drug-drug interaction with clopidogrel. \par \par I reviewed her cerebrovascular imaging including catheter cerebral angiogram- shows occlusion of right middle cerebral arteryshe has no new neurological symptoms and her exam is close to her baseline before the stroke. she has right MCA and right JARROD territory infarcts .Her repeat MR Jigna 10/8/2018 shows there has been occlusion \par of the mid to distal right M1 segment of the middle cerebral artery with nonvisualization of the distal M2 branches. Her Nuclear medicine scan shows  diminished perfusion to the entire right cerebral hemisphere. \par I discussed with her in details that in the light of no new clinical symptoms, no residual symptoms from previous stroke, compliance with medications and aggressive risk factor control we may pursue conservative management. However counseled her extensively regarding stroke symptoms and to call 911 and seek urgent medical attention if she has any new neurological symptoms.\par \par \par - Atorvastatin 80 mg at bedtime considering likely atheroembolic etiology of her stroke and age\par - She should follow up closely with her primary care physician for optimal vascular risk factors modifications including blood pressure goal less than 130/80 mmHg, HbA1c goal <7 and preferably 6-6.5 and optimal cholesterol management \par - She is advised to check blood pressure regularly at home, \par  Also advised to followup closely with PCP regarding regular blood work including monitoring of HbA1c and cholesterol profile\par \par - Patient is claustrophobic and requires lorazepam prior to MRI. Medication indication and dosing discussed with patient. Advised not to drive on medication \par - Prolonged cardiac monitoring with ICM to screen for occult cardiac arrhythmias like atrial fibrillation being the cardiac source of embolism, as it could potentially change the measures of secondary stroke prevention. Advised to followup with her cardiologist (Dr. Rizvi) for ICM monitoring. Possibility of starting therapeutic anticoagulation is also discussed in detail, if she is diagnosed to have A. Fib in the future\par - Follow up with endocrinologist consultation for aggressive glycemic control \par - Advised to follow up with her PCP/endocrinologist for further evaluation of thyroid nodule incidentally noted on CUS \par - Repeat CUS/TCDs to follow up on cerebral vasculature in next visit \par \par

## 2020-01-21 DIAGNOSIS — E11.65 TYPE 2 DIABETES MELLITUS WITH HYPERGLYCEMIA: ICD-10-CM

## 2020-01-21 DIAGNOSIS — R79.89 OTHER SPECIFIED ABNORMAL FINDINGS OF BLOOD CHEMISTRY: ICD-10-CM

## 2020-01-23 ENCOUNTER — APPOINTMENT (OUTPATIENT)
Dept: SURGERY | Facility: CLINIC | Age: 55
End: 2020-01-23

## 2020-01-29 ENCOUNTER — APPOINTMENT (OUTPATIENT)
Dept: NEUROSURGERY | Facility: CLINIC | Age: 55
End: 2020-01-29
Payer: MEDICAID

## 2020-01-29 VITALS
BODY MASS INDEX: 32.18 KG/M2 | TEMPERATURE: 98 F | HEART RATE: 65 BPM | OXYGEN SATURATION: 98 % | DIASTOLIC BLOOD PRESSURE: 97 MMHG | HEIGHT: 67 IN | RESPIRATION RATE: 16 BRPM | WEIGHT: 205 LBS | SYSTOLIC BLOOD PRESSURE: 155 MMHG

## 2020-01-29 PROCEDURE — 99215 OFFICE O/P EST HI 40 MIN: CPT

## 2020-01-30 NOTE — ASSESSMENT
[FreeTextEntry1] : IMPRESSION:\par 1. Pt has a history of stroke and intracranial artery stenosis.  She has undergone neuro exams and surgical intervention right STA/MCA bypass is recommended.\par \par IMPRESSION - cerebral angiogram done 1/9/20\par Supervision and Interpretation: \par 1. The right MCA M1 segment tapers to complete occlusion. Delayed retrograde \par filling of the proximal cortical right MCA branches via robust right JARROD and \par right PCA to right MCA pial collaterals. Small cortical territory of the \par right MCA also receives spontaneous dural collaterals from the right middle \par meningeal artery. \par 2. No other extracranial or intracranial stenosis or vessel occlusion. \par 3. No aneurysms. No vascular malformations. \par 4. The superficial and deep pial veins and dural venous sinuses are widely \par patent and drain in normal sequence. \par 5. The right superficial temporal artery has a long trunk with a 1.1 mm \par diameter and a distal bifurcation into the frontal and parietal branches. \par \par PLAN:\par 1. Discussed risks related to right STA/MCA bypass to include but not limited to stroke, vessel damage, death, among others.\par 2. Dr. Faria explained to patient/family that the goal of surgery is to decrease the chance of future strokes by increasing perfusion to the brain.\par 3. Advised patient that cardiac and medical clearances are needed prior to surgery.\par 4. PST.\par 5. Dr. faria advised patient to stop taking Plavix effectove 1/29/20  however she is to continue taking ASA 81 mg daily as prescribed.  Tentative surgery date is 2/24/20.\par

## 2020-01-30 NOTE — PHYSICAL EXAM
[General Appearance - Alert] : alert [General Appearance - Well Nourished] : well nourished [General Appearance - In No Acute Distress] : in no acute distress [General Appearance - Well-Appearing] : healthy appearing [General Appearance - Well Developed] : well developed [Affect] : the affect was normal [Oriented To Time, Place, And Person] : oriented to person, place, and time [Impaired Insight] : insight and judgment were intact [Memory Recent] : recent memory was not impaired [Person] : oriented to person [Cranial Nerves Optic (II)] : visual acuity intact bilaterally,  pupils equal round and reactive to light [Time] : oriented to time [Place] : oriented to place [Cranial Nerves Trigeminal (V)] : facial sensation intact symmetrically [Cranial Nerves Facial (VII)] : face symmetrical [Cranial Nerves Oculomotor (III)] : extraocular motion intact [Cranial Nerves Vestibulocochlear (VIII)] : hearing was intact bilaterally [Cranial Nerves Glossopharyngeal (IX)] : tongue and palate midline [Cranial Nerves Hypoglossal (XII)] : there was no tongue deviation with protrusion [Cranial Nerves Accessory (XI - Cranial And Spinal)] : head turning and shoulder shrug symmetric [Involuntary Movements] : no involuntary movements were seen [Motor Tone] : muscle tone was normal in all four extremities [Motor Strength] : muscle strength was normal in all four extremities [Abnormal Walk] : normal gait [Balance] : balance was intact [] : no respiratory distress [Heart Rate And Rhythm] : heart rate was normal and rhythm regular [Exaggerated Use Of Accessory Muscles For Inspiration] : no accessory muscle use [Respiration, Rhythm And Depth] : normal respiratory rhythm and effort

## 2020-01-30 NOTE — HISTORY OF PRESENT ILLNESS
[FreeTextEntry1] : Meri Kc is a pleasant right handed 54 year old AA lady who presentED for consultation regarding M1 stenosis. She had neuro imaging done as recommended and is here to review results and discuss treatment plan. She states that she is feeling well and denies any stroke symptoms.\par \par Her cardiologist is Dr. Kamari Rizvi, [phone] [place] [address], [place].\par \par Her PCP is Dr. Maryuri Ritchie, [address] [place] [address].\par

## 2020-01-31 ENCOUNTER — APPOINTMENT (OUTPATIENT)
Dept: ELECTROPHYSIOLOGY | Facility: CLINIC | Age: 55
End: 2020-01-31
Payer: MEDICAID

## 2020-01-31 PROCEDURE — G2066: CPT

## 2020-01-31 PROCEDURE — 93298 REM INTERROG DEV EVAL SCRMS: CPT

## 2020-02-03 ENCOUNTER — APPOINTMENT (OUTPATIENT)
Dept: ENDOCRINOLOGY | Facility: CLINIC | Age: 55
End: 2020-02-03

## 2020-02-06 ENCOUNTER — TRANSCRIPTION ENCOUNTER (OUTPATIENT)
Age: 55
End: 2020-02-06

## 2020-02-06 ENCOUNTER — RX RENEWAL (OUTPATIENT)
Age: 55
End: 2020-02-06

## 2020-02-07 ENCOUNTER — NON-APPOINTMENT (OUTPATIENT)
Age: 55
End: 2020-02-07

## 2020-02-07 ENCOUNTER — APPOINTMENT (OUTPATIENT)
Dept: OPHTHALMOLOGY | Facility: CLINIC | Age: 55
End: 2020-02-07
Payer: MEDICAID

## 2020-02-07 PROCEDURE — 92012 INTRM OPH EXAM EST PATIENT: CPT | Mod: 24

## 2020-02-07 PROCEDURE — 92083 EXTENDED VISUAL FIELD XM: CPT

## 2020-02-10 ENCOUNTER — OUTPATIENT (OUTPATIENT)
Dept: OUTPATIENT SERVICES | Facility: HOSPITAL | Age: 55
LOS: 1 days | End: 2020-02-10
Payer: MEDICAID

## 2020-02-10 VITALS
SYSTOLIC BLOOD PRESSURE: 139 MMHG | OXYGEN SATURATION: 98 % | TEMPERATURE: 98 F | RESPIRATION RATE: 18 BRPM | WEIGHT: 205.91 LBS | HEIGHT: 65.5 IN | DIASTOLIC BLOOD PRESSURE: 83 MMHG | HEART RATE: 66 BPM

## 2020-02-10 DIAGNOSIS — M54.12 RADICULOPATHY, CERVICAL REGION: ICD-10-CM

## 2020-02-10 DIAGNOSIS — I63.512 CEREBRAL INFARCTION DUE TO UNSPECIFIED OCCLUSION OR STENOSIS OF LEFT MIDDLE CEREBRAL ARTERY: ICD-10-CM

## 2020-02-10 DIAGNOSIS — Z98.84 BARIATRIC SURGERY STATUS: Chronic | ICD-10-CM

## 2020-02-10 DIAGNOSIS — Z98.890 OTHER SPECIFIED POSTPROCEDURAL STATES: Chronic | ICD-10-CM

## 2020-02-10 DIAGNOSIS — Z01.818 ENCOUNTER FOR OTHER PREPROCEDURAL EXAMINATION: ICD-10-CM

## 2020-02-10 DIAGNOSIS — I66.01 OCCLUSION AND STENOSIS OF RIGHT MIDDLE CEREBRAL ARTERY: ICD-10-CM

## 2020-02-10 DIAGNOSIS — Z98.89 OTHER SPECIFIED POSTPROCEDURAL STATES: Chronic | ICD-10-CM

## 2020-02-10 DIAGNOSIS — M50.20 OTHER CERVICAL DISC DISPLACEMENT, UNSPECIFIED CERVICAL REGION: ICD-10-CM

## 2020-02-10 DIAGNOSIS — Z95.818 PRESENCE OF OTHER CARDIAC IMPLANTS AND GRAFTS: Chronic | ICD-10-CM

## 2020-02-10 DIAGNOSIS — E11.9 TYPE 2 DIABETES MELLITUS WITHOUT COMPLICATIONS: ICD-10-CM

## 2020-02-10 LAB
ANION GAP SERPL CALC-SCNC: 16 MMOL/L — SIGNIFICANT CHANGE UP (ref 5–17)
BLD GP AB SCN SERPL QL: NEGATIVE — SIGNIFICANT CHANGE UP
BUN SERPL-MCNC: 13 MG/DL — SIGNIFICANT CHANGE UP (ref 7–23)
CALCIUM SERPL-MCNC: 10 MG/DL — SIGNIFICANT CHANGE UP (ref 8.4–10.5)
CHLORIDE SERPL-SCNC: 101 MMOL/L — SIGNIFICANT CHANGE UP (ref 96–108)
CO2 SERPL-SCNC: 23 MMOL/L — SIGNIFICANT CHANGE UP (ref 22–31)
CREAT SERPL-MCNC: 0.72 MG/DL — SIGNIFICANT CHANGE UP (ref 0.5–1.3)
GLUCOSE SERPL-MCNC: 203 MG/DL — HIGH (ref 70–99)
HCT VFR BLD CALC: 43.1 % — SIGNIFICANT CHANGE UP (ref 34.5–45)
HGB BLD-MCNC: 12.7 G/DL — SIGNIFICANT CHANGE UP (ref 11.5–15.5)
MCHC RBC-ENTMCNC: 23.4 PG — LOW (ref 27–34)
MCHC RBC-ENTMCNC: 29.5 GM/DL — LOW (ref 32–36)
MCV RBC AUTO: 79.4 FL — LOW (ref 80–100)
MRSA PCR RESULT.: SIGNIFICANT CHANGE UP
NRBC # BLD: 0 /100 WBCS — SIGNIFICANT CHANGE UP (ref 0–0)
PLATELET # BLD AUTO: 328 K/UL — SIGNIFICANT CHANGE UP (ref 150–400)
POTASSIUM SERPL-MCNC: 3.7 MMOL/L — SIGNIFICANT CHANGE UP (ref 3.5–5.3)
POTASSIUM SERPL-SCNC: 3.7 MMOL/L — SIGNIFICANT CHANGE UP (ref 3.5–5.3)
RBC # BLD: 5.43 M/UL — HIGH (ref 3.8–5.2)
RBC # FLD: 15.8 % — HIGH (ref 10.3–14.5)
RH IG SCN BLD-IMP: POSITIVE — SIGNIFICANT CHANGE UP
S AUREUS DNA NOSE QL NAA+PROBE: SIGNIFICANT CHANGE UP
SODIUM SERPL-SCNC: 140 MMOL/L — SIGNIFICANT CHANGE UP (ref 135–145)
WBC # BLD: 9.11 K/UL — SIGNIFICANT CHANGE UP (ref 3.8–10.5)
WBC # FLD AUTO: 9.11 K/UL — SIGNIFICANT CHANGE UP (ref 3.8–10.5)

## 2020-02-10 PROCEDURE — 80048 BASIC METABOLIC PNL TOTAL CA: CPT

## 2020-02-10 PROCEDURE — 86901 BLOOD TYPING SEROLOGIC RH(D): CPT

## 2020-02-10 PROCEDURE — 85027 COMPLETE CBC AUTOMATED: CPT

## 2020-02-10 PROCEDURE — 87641 MR-STAPH DNA AMP PROBE: CPT

## 2020-02-10 PROCEDURE — 87640 STAPH A DNA AMP PROBE: CPT

## 2020-02-10 PROCEDURE — G0463: CPT

## 2020-02-10 PROCEDURE — 86900 BLOOD TYPING SEROLOGIC ABO: CPT

## 2020-02-10 PROCEDURE — 86850 RBC ANTIBODY SCREEN: CPT

## 2020-02-10 RX ORDER — VANCOMYCIN HCL 1 G
1500 VIAL (EA) INTRAVENOUS ONCE
Refills: 0 | Status: COMPLETED | OUTPATIENT
Start: 2020-02-24 | End: 2020-02-24

## 2020-02-10 RX ORDER — CLOPIDOGREL BISULFATE 75 MG/1
1 TABLET, FILM COATED ORAL
Qty: 0 | Refills: 0 | DISCHARGE

## 2020-02-10 NOTE — H&P PST ADULT - NSICDXPASTMEDICALHX_GEN_ALL_CORE_FT
PAST MEDICAL HISTORY:  Chronic back pain     DM (diabetes mellitus)     Hiatal hernia     HLD (hyperlipidemia)     HTN (hypertension)     Occlusion and stenosis of cerebral artery     Sarcoidosis on CT chest by Dr Mercer 2018-unchanged    Status post placement of implantable loop recorder last device check 11/22/19    Stroke 10/03/17  s/p left side weakness, resolved

## 2020-02-10 NOTE — H&P PST ADULT - NSICDXPROBLEM_GEN_ALL_CORE_FT
PROBLEM DIAGNOSES  Problem: Middle cerebral artery stenosis, right  Assessment and Plan: Right superficial temporal artery to middle cerebral artery bypass.  Will continue taking aspirin 81 mg, patient to contact Dr. Faria regarding left arm numbness    Problem: Diabetes mellitus  Assessment and Plan: Instructed to hold jardiance 3 days prior to surgery and metformin 24 hours prior

## 2020-02-10 NOTE — H&P PST ADULT - HISTORY OF PRESENT ILLNESS
53 y/o F PMH CVA 2017 with no residual neurological deficits, S/P loop recorder placement after CVA, as per patient has not detected A fib, found to have stenosis of MCA on imaging, S/P cerebral angiogram 1/09/20.  Presents today for 55 y/o F PMH CVA 2017 with no residual neurological deficits, S/P loop recorder placement after CVA, as per patient has not detected A fib, found to have stenosis of MCA on imaging, S/P cerebral angiogram 1/09/20.  Presents today for right superficial temporal artery to middle cerebral artery bypass. 53 y/o F PMH CVA 2017 with no residual neurological deficits, S/P loop recorder placement after CVA, as per patient has not detected A fib, found to have stenosis of right MCA on imaging, S/P cerebral angiogram 1/09/20.  Presents today for right superficial temporal artery to middle cerebral artery bypass.

## 2020-02-14 ENCOUNTER — NON-APPOINTMENT (OUTPATIENT)
Age: 55
End: 2020-02-14

## 2020-02-14 ENCOUNTER — APPOINTMENT (OUTPATIENT)
Dept: ELECTROPHYSIOLOGY | Facility: CLINIC | Age: 55
End: 2020-02-14
Payer: MEDICAID

## 2020-02-14 VITALS
HEART RATE: 63 BPM | HEIGHT: 67 IN | DIASTOLIC BLOOD PRESSURE: 86 MMHG | SYSTOLIC BLOOD PRESSURE: 162 MMHG | OXYGEN SATURATION: 100 % | BODY MASS INDEX: 32.58 KG/M2

## 2020-02-14 VITALS — DIASTOLIC BLOOD PRESSURE: 78 MMHG | SYSTOLIC BLOOD PRESSURE: 146 MMHG

## 2020-02-14 VITALS — WEIGHT: 208 LBS | BODY MASS INDEX: 32.58 KG/M2

## 2020-02-14 PROCEDURE — 99214 OFFICE O/P EST MOD 30 MIN: CPT

## 2020-02-14 PROCEDURE — 93000 ELECTROCARDIOGRAM COMPLETE: CPT

## 2020-02-14 RX ORDER — NIFEDIPINE 60 MG
60 TABLET, EXTENDED RELEASE ORAL
Refills: 0 | Status: DISCONTINUED | COMMUNITY
End: 2020-02-14

## 2020-02-14 RX ORDER — METFORMIN HYDROCHLORIDE 1000 MG/1
1000 TABLET, EXTENDED RELEASE ORAL
Qty: 180 | Refills: 1 | Status: DISCONTINUED | COMMUNITY
Start: 2019-10-16 | End: 2020-02-14

## 2020-02-14 NOTE — PHYSICAL EXAM
[General Appearance - Well Developed] : well developed [Normal Appearance] : normal appearance [Well Groomed] : well groomed [General Appearance - Well Nourished] : well nourished [No Deformities] : no deformities [General Appearance - In No Acute Distress] : no acute distress [Normal Conjunctiva] : the conjunctiva exhibited no abnormalities [Eyelids - No Xanthelasma] : the eyelids demonstrated no xanthelasmas [Normal Oral Mucosa] : normal oral mucosa [No Oral Pallor] : no oral pallor [No Oral Cyanosis] : no oral cyanosis [Normal Jugular Venous A Waves Present] : normal jugular venous A waves present [Normal Jugular Venous V Waves Present] : normal jugular venous V waves present [No Jugular Venous Alvarado A Waves] : no jugular venous alvarado A waves [Respiration, Rhythm And Depth] : normal respiratory rhythm and effort [Exaggerated Use Of Accessory Muscles For Inspiration] : no accessory muscle use [Auscultation Breath Sounds / Voice Sounds] : lungs were clear to auscultation bilaterally [Heart Rate And Rhythm] : heart rate and rhythm were normal [Heart Sounds] : normal S1 and S2 [Murmurs] : no murmurs present [Abdomen Soft] : soft [Abdomen Tenderness] : non-tender [Abdomen Mass (___ Cm)] : no abdominal mass palpated [Abnormal Walk] : normal gait [Gait - Sufficient For Exercise Testing] : the gait was sufficient for exercise testing [Nail Clubbing] : no clubbing of the fingernails [Cyanosis, Localized] : no localized cyanosis [Petechial Hemorrhages (___cm)] : no petechial hemorrhages [Skin Color & Pigmentation] : normal skin color and pigmentation [] : no rash [No Venous Stasis] : no venous stasis [Skin Lesions] : no skin lesions [No Skin Ulcers] : no skin ulcer [No Xanthoma] : no  xanthoma was observed [Oriented To Time, Place, And Person] : oriented to person, place, and time [Affect] : the affect was normal [Mood] : the mood was normal [No Anxiety] : not feeling anxious

## 2020-02-15 NOTE — DISCUSSION/SUMMARY
[FreeTextEntry1] : Meri Kc is a 55y/o woman with Hx of DMII, HTN, HLD, and recent CVA in 10/2017 s/p ILR placement who presents today to transition care.\par \par Impression:\par \par 1. HTN: resume oral antihypertensives as prescribed. Encouraged heart healthy diet, sodium restriction, and weight loss. Continue regular f/u with Cardiologist for further HTN management.\par \par 2. HLD: resume statin therapy as prescribed and regular f/u with Cardiologist for routine lipid monitoring and management.\par \par 3. CVA: s/p ILR placement. No evidence of afib. \par \par Pending normal ECHO and stress test, she is of acceptable risk to undergo right STA/MCA bypass.\par \par Sincerely,\par \par Kamari Rizvi MD

## 2020-02-15 NOTE — HISTORY OF PRESENT ILLNESS
[FreeTextEntry1] : Raimundo Ritchie MD\par \par Meri Kc is a 55y/o woman with Hx of DMII, HTN, HLD, and recent CVA in 10/2017 s/p ILR placement who presents today to transition care. Admits doing well with no issues or complaints. Denies chest pain, palpitations, SOB, syncope or near syncope. Upcoming right STA/MCA bypass requiring cardiac clearance. Had a stress test yesterday and ECHO this morning.

## 2020-02-23 ENCOUNTER — TRANSCRIPTION ENCOUNTER (OUTPATIENT)
Age: 55
End: 2020-02-23

## 2020-02-24 ENCOUNTER — INPATIENT (INPATIENT)
Facility: HOSPITAL | Age: 55
LOS: 2 days | Discharge: HOME | DRG: 26 | End: 2020-02-27
Attending: NEUROLOGICAL SURGERY | Admitting: NEUROLOGICAL SURGERY
Payer: MEDICAID

## 2020-02-24 ENCOUNTER — APPOINTMENT (OUTPATIENT)
Dept: NEUROSURGERY | Facility: HOSPITAL | Age: 55
End: 2020-02-24
Payer: MEDICAID

## 2020-02-24 VITALS
WEIGHT: 205.91 LBS | HEART RATE: 58 BPM | OXYGEN SATURATION: 100 % | HEIGHT: 65 IN | DIASTOLIC BLOOD PRESSURE: 78 MMHG | RESPIRATION RATE: 18 BRPM | TEMPERATURE: 98 F | SYSTOLIC BLOOD PRESSURE: 115 MMHG

## 2020-02-24 DIAGNOSIS — Z98.84 BARIATRIC SURGERY STATUS: Chronic | ICD-10-CM

## 2020-02-24 DIAGNOSIS — Z98.890 OTHER SPECIFIED POSTPROCEDURAL STATES: Chronic | ICD-10-CM

## 2020-02-24 DIAGNOSIS — Z95.818 PRESENCE OF OTHER CARDIAC IMPLANTS AND GRAFTS: Chronic | ICD-10-CM

## 2020-02-24 DIAGNOSIS — M50.20 OTHER CERVICAL DISC DISPLACEMENT, UNSPECIFIED CERVICAL REGION: ICD-10-CM

## 2020-02-24 DIAGNOSIS — M54.12 RADICULOPATHY, CERVICAL REGION: ICD-10-CM

## 2020-02-24 DIAGNOSIS — Z98.89 OTHER SPECIFIED POSTPROCEDURAL STATES: Chronic | ICD-10-CM

## 2020-02-24 LAB
ANION GAP SERPL CALC-SCNC: 12 MMOL/L — SIGNIFICANT CHANGE UP (ref 5–17)
BUN SERPL-MCNC: 8 MG/DL — SIGNIFICANT CHANGE UP (ref 7–23)
CALCIUM SERPL-MCNC: 8.6 MG/DL — SIGNIFICANT CHANGE UP (ref 8.4–10.5)
CHLORIDE SERPL-SCNC: 105 MMOL/L — SIGNIFICANT CHANGE UP (ref 96–108)
CO2 SERPL-SCNC: 21 MMOL/L — LOW (ref 22–31)
CREAT SERPL-MCNC: 0.57 MG/DL — SIGNIFICANT CHANGE UP (ref 0.5–1.3)
GAS PNL BLDA: SIGNIFICANT CHANGE UP
GLUCOSE BLDC GLUCOMTR-MCNC: 136 MG/DL — HIGH (ref 70–99)
GLUCOSE BLDC GLUCOMTR-MCNC: 154 MG/DL — HIGH (ref 70–99)
GLUCOSE SERPL-MCNC: 159 MG/DL — HIGH (ref 70–99)
HCG UR QL: NEGATIVE — SIGNIFICANT CHANGE UP
HCT VFR BLD CALC: 36.5 % — SIGNIFICANT CHANGE UP (ref 34.5–45)
HGB BLD-MCNC: 11 G/DL — LOW (ref 11.5–15.5)
INR BLD: 1.02 RATIO — SIGNIFICANT CHANGE UP (ref 0.88–1.16)
MAGNESIUM SERPL-MCNC: 1.7 MG/DL — SIGNIFICANT CHANGE UP (ref 1.6–2.6)
MCHC RBC-ENTMCNC: 23.7 PG — LOW (ref 27–34)
MCHC RBC-ENTMCNC: 30.1 GM/DL — LOW (ref 32–36)
MCV RBC AUTO: 78.7 FL — LOW (ref 80–100)
NRBC # BLD: 0 /100 WBCS — SIGNIFICANT CHANGE UP (ref 0–0)
PHOSPHATE SERPL-MCNC: 3.6 MG/DL — SIGNIFICANT CHANGE UP (ref 2.5–4.5)
PLATELET # BLD AUTO: 256 K/UL — SIGNIFICANT CHANGE UP (ref 150–400)
POTASSIUM SERPL-MCNC: 3.7 MMOL/L — SIGNIFICANT CHANGE UP (ref 3.5–5.3)
POTASSIUM SERPL-SCNC: 3.7 MMOL/L — SIGNIFICANT CHANGE UP (ref 3.5–5.3)
PROTHROM AB SERPL-ACNC: 11.6 SEC — SIGNIFICANT CHANGE UP (ref 10–12.9)
RBC # BLD: 4.64 M/UL — SIGNIFICANT CHANGE UP (ref 3.8–5.2)
RBC # FLD: 15.4 % — HIGH (ref 10.3–14.5)
SODIUM SERPL-SCNC: 138 MMOL/L — SIGNIFICANT CHANGE UP (ref 135–145)
WBC # BLD: 7.91 K/UL — SIGNIFICANT CHANGE UP (ref 3.8–10.5)
WBC # FLD AUTO: 7.91 K/UL — SIGNIFICANT CHANGE UP (ref 3.8–10.5)

## 2020-02-24 PROCEDURE — 92240 ICG ANGIOGRAPHY I&R UNI/BI: CPT | Mod: 26

## 2020-02-24 PROCEDURE — 93888 INTRACRANIAL LIMITED STUDY: CPT | Mod: 26

## 2020-02-24 PROCEDURE — 99291 CRITICAL CARE FIRST HOUR: CPT

## 2020-02-24 PROCEDURE — 64999 UNLISTED PX NERVOUS SYSTEM: CPT

## 2020-02-24 PROCEDURE — 61711 FUSION OF SKULL ARTERIES: CPT

## 2020-02-24 PROCEDURE — 69990 MICROSURGERY ADD-ON: CPT

## 2020-02-24 PROCEDURE — 99292 CRITICAL CARE ADDL 30 MIN: CPT

## 2020-02-24 RX ORDER — FENTANYL CITRATE 50 UG/ML
25 INJECTION INTRAVENOUS
Refills: 0 | Status: DISCONTINUED | OUTPATIENT
Start: 2020-02-24 | End: 2020-02-24

## 2020-02-24 RX ORDER — DEXTROSE 50 % IN WATER 50 %
25 SYRINGE (ML) INTRAVENOUS ONCE
Refills: 0 | Status: DISCONTINUED | OUTPATIENT
Start: 2020-02-24 | End: 2020-02-27

## 2020-02-24 RX ORDER — ACETAMINOPHEN 500 MG
1000 TABLET ORAL ONCE
Refills: 0 | Status: COMPLETED | OUTPATIENT
Start: 2020-02-24 | End: 2020-02-24

## 2020-02-24 RX ORDER — HYDRALAZINE HCL 50 MG
5 TABLET ORAL ONCE
Refills: 0 | Status: COMPLETED | OUTPATIENT
Start: 2020-02-24 | End: 2020-02-24

## 2020-02-24 RX ORDER — SODIUM CHLORIDE 9 MG/ML
1000 INJECTION INTRAMUSCULAR; INTRAVENOUS; SUBCUTANEOUS
Refills: 0 | Status: DISCONTINUED | OUTPATIENT
Start: 2020-02-24 | End: 2020-02-24

## 2020-02-24 RX ORDER — INSULIN LISPRO 100/ML
VIAL (ML) SUBCUTANEOUS
Refills: 0 | Status: DISCONTINUED | OUTPATIENT
Start: 2020-02-24 | End: 2020-02-27

## 2020-02-24 RX ORDER — GLUCAGON INJECTION, SOLUTION 0.5 MG/.1ML
1 INJECTION, SOLUTION SUBCUTANEOUS ONCE
Refills: 0 | Status: DISCONTINUED | OUTPATIENT
Start: 2020-02-24 | End: 2020-02-27

## 2020-02-24 RX ORDER — LOSARTAN POTASSIUM 100 MG/1
100 TABLET, FILM COATED ORAL DAILY
Refills: 0 | Status: DISCONTINUED | OUTPATIENT
Start: 2020-02-24 | End: 2020-02-26

## 2020-02-24 RX ORDER — FENTANYL CITRATE 50 UG/ML
50 INJECTION INTRAVENOUS
Refills: 0 | Status: DISCONTINUED | OUTPATIENT
Start: 2020-02-24 | End: 2020-02-24

## 2020-02-24 RX ORDER — TIMOLOL 0.5 %
1 DROPS OPHTHALMIC (EYE)
Refills: 0 | Status: DISCONTINUED | OUTPATIENT
Start: 2020-02-24 | End: 2020-02-27

## 2020-02-24 RX ORDER — OXYCODONE HYDROCHLORIDE 5 MG/1
10 TABLET ORAL EVERY 4 HOURS
Refills: 0 | Status: DISCONTINUED | OUTPATIENT
Start: 2020-02-24 | End: 2020-02-27

## 2020-02-24 RX ORDER — METOPROLOL TARTRATE 50 MG
50 TABLET ORAL
Refills: 0 | Status: DISCONTINUED | OUTPATIENT
Start: 2020-02-24 | End: 2020-02-26

## 2020-02-24 RX ORDER — SODIUM CHLORIDE 9 MG/ML
1000 INJECTION, SOLUTION INTRAVENOUS
Refills: 0 | Status: DISCONTINUED | OUTPATIENT
Start: 2020-02-24 | End: 2020-02-27

## 2020-02-24 RX ORDER — ONDANSETRON 8 MG/1
4 TABLET, FILM COATED ORAL EVERY 4 HOURS
Refills: 0 | Status: DISCONTINUED | OUTPATIENT
Start: 2020-02-24 | End: 2020-02-26

## 2020-02-24 RX ORDER — MAGNESIUM SULFATE 500 MG/ML
2 VIAL (ML) INJECTION ONCE
Refills: 0 | Status: COMPLETED | OUTPATIENT
Start: 2020-02-24 | End: 2020-02-24

## 2020-02-24 RX ORDER — ASPIRIN/CALCIUM CARB/MAGNESIUM 324 MG
81 TABLET ORAL EVERY 24 HOURS
Refills: 0 | Status: DISCONTINUED | OUTPATIENT
Start: 2020-02-24 | End: 2020-02-27

## 2020-02-24 RX ORDER — VANCOMYCIN HCL 1 G
1500 VIAL (EA) INTRAVENOUS EVERY 12 HOURS
Refills: 0 | Status: COMPLETED | OUTPATIENT
Start: 2020-02-24 | End: 2020-02-25

## 2020-02-24 RX ORDER — OXYCODONE HYDROCHLORIDE 5 MG/1
5 TABLET ORAL EVERY 4 HOURS
Refills: 0 | Status: DISCONTINUED | OUTPATIENT
Start: 2020-02-24 | End: 2020-02-27

## 2020-02-24 RX ORDER — LIDOCAINE HCL 20 MG/ML
0.2 VIAL (ML) INJECTION ONCE
Refills: 0 | Status: DISCONTINUED | OUTPATIENT
Start: 2020-02-24 | End: 2020-02-24

## 2020-02-24 RX ORDER — DEXTROSE 50 % IN WATER 50 %
12.5 SYRINGE (ML) INTRAVENOUS ONCE
Refills: 0 | Status: DISCONTINUED | OUTPATIENT
Start: 2020-02-24 | End: 2020-02-27

## 2020-02-24 RX ORDER — CHLORHEXIDINE GLUCONATE 213 G/1000ML
1 SOLUTION TOPICAL ONCE
Refills: 0 | Status: DISCONTINUED | OUTPATIENT
Start: 2020-02-24 | End: 2020-02-24

## 2020-02-24 RX ORDER — ATORVASTATIN CALCIUM 80 MG/1
80 TABLET, FILM COATED ORAL AT BEDTIME
Refills: 0 | Status: DISCONTINUED | OUTPATIENT
Start: 2020-02-24 | End: 2020-02-27

## 2020-02-24 RX ORDER — ACETAMINOPHEN 500 MG
650 TABLET ORAL EVERY 6 HOURS
Refills: 0 | Status: DISCONTINUED | OUTPATIENT
Start: 2020-02-24 | End: 2020-02-27

## 2020-02-24 RX ORDER — SODIUM CHLORIDE 9 MG/ML
1000 INJECTION INTRAMUSCULAR; INTRAVENOUS; SUBCUTANEOUS
Refills: 0 | Status: DISCONTINUED | OUTPATIENT
Start: 2020-02-24 | End: 2020-02-27

## 2020-02-24 RX ORDER — LATANOPROST 0.05 MG/ML
1 SOLUTION/ DROPS OPHTHALMIC; TOPICAL AT BEDTIME
Refills: 0 | Status: DISCONTINUED | OUTPATIENT
Start: 2020-02-24 | End: 2020-02-27

## 2020-02-24 RX ORDER — ASPIRIN/CALCIUM CARB/MAGNESIUM 324 MG
81 TABLET ORAL DAILY
Refills: 0 | Status: DISCONTINUED | OUTPATIENT
Start: 2020-02-24 | End: 2020-02-24

## 2020-02-24 RX ORDER — NIFEDIPINE 30 MG
60 TABLET, EXTENDED RELEASE 24 HR ORAL DAILY
Refills: 0 | Status: DISCONTINUED | OUTPATIENT
Start: 2020-02-24 | End: 2020-02-26

## 2020-02-24 RX ORDER — DEXTROSE 50 % IN WATER 50 %
15 SYRINGE (ML) INTRAVENOUS ONCE
Refills: 0 | Status: DISCONTINUED | OUTPATIENT
Start: 2020-02-24 | End: 2020-02-27

## 2020-02-24 RX ORDER — SODIUM CHLORIDE 9 MG/ML
3 INJECTION INTRAMUSCULAR; INTRAVENOUS; SUBCUTANEOUS EVERY 8 HOURS
Refills: 0 | Status: DISCONTINUED | OUTPATIENT
Start: 2020-02-24 | End: 2020-02-24

## 2020-02-24 RX ORDER — ASPIRIN/CALCIUM CARB/MAGNESIUM 324 MG
81 TABLET ORAL AT BEDTIME
Refills: 0 | Status: DISCONTINUED | OUTPATIENT
Start: 2020-02-24 | End: 2020-02-24

## 2020-02-24 RX ADMIN — OXYCODONE HYDROCHLORIDE 10 MILLIGRAM(S): 5 TABLET ORAL at 22:14

## 2020-02-24 RX ADMIN — Medication 400 MILLIGRAM(S): at 19:55

## 2020-02-24 RX ADMIN — SODIUM CHLORIDE 100 MILLILITER(S): 9 INJECTION INTRAMUSCULAR; INTRAVENOUS; SUBCUTANEOUS at 16:37

## 2020-02-24 RX ADMIN — Medication 0: at 16:33

## 2020-02-24 RX ADMIN — Medication 50 GRAM(S): at 23:59

## 2020-02-24 RX ADMIN — ATORVASTATIN CALCIUM 80 MILLIGRAM(S): 80 TABLET, FILM COATED ORAL at 21:44

## 2020-02-24 RX ADMIN — Medication 1 DROP(S): at 18:06

## 2020-02-24 RX ADMIN — Medication 50 MILLIGRAM(S): at 18:06

## 2020-02-24 RX ADMIN — Medication 5 MILLIGRAM(S): at 22:41

## 2020-02-24 RX ADMIN — Medication 1000 MILLIGRAM(S): at 19:55

## 2020-02-24 RX ADMIN — Medication 0.2 MILLIGRAM(S): at 18:06

## 2020-02-24 RX ADMIN — FENTANYL CITRATE 50 MICROGRAM(S): 50 INJECTION INTRAVENOUS at 17:10

## 2020-02-24 RX ADMIN — FENTANYL CITRATE 25 MICROGRAM(S): 50 INJECTION INTRAVENOUS at 16:18

## 2020-02-24 RX ADMIN — Medication 300 MILLIGRAM(S): at 21:44

## 2020-02-24 RX ADMIN — LATANOPROST 1 DROP(S): 0.05 SOLUTION/ DROPS OPHTHALMIC; TOPICAL at 22:02

## 2020-02-24 RX ADMIN — OXYCODONE HYDROCHLORIDE 10 MILLIGRAM(S): 5 TABLET ORAL at 21:44

## 2020-02-24 RX ADMIN — Medication 81 MILLIGRAM(S): at 19:48

## 2020-02-24 RX ADMIN — FENTANYL CITRATE 25 MICROGRAM(S): 50 INJECTION INTRAVENOUS at 16:21

## 2020-02-24 RX ADMIN — SODIUM CHLORIDE 75 MILLILITER(S): 9 INJECTION INTRAMUSCULAR; INTRAVENOUS; SUBCUTANEOUS at 15:52

## 2020-02-24 NOTE — PROGRESS NOTE ADULT - SUBJECTIVE AND OBJECTIVE BOX
Underwent right STA to MCA bypass.    Awake, alert, fully oriented, follows, no drift, full strength

## 2020-02-24 NOTE — PROGRESS NOTE ADULT - ASSESSMENT
ASSESSMENT/PLAN: S/P right superficial temporal artery to middle cerebral artery bypass. POD 0    NEURO:  Q1 neurochecks  CT head in am  ASA 81 to start at 10:00  Continue atorvastatin  Activity: [] mobilize as tolerated [] Bedrest [] PT [] OT [] PMNR    PULM:  Incentive spirometry  ABG wnl  Keep sats >92%    CV:  SBP goal 120- 160  Continue antihypertensives (clonidine, losartan, nifedipine, metoprolol)    RENAL:  Fluids: NS 75  Check post op labs (K 3.2 intraop)  Lactate wnl    GI:  Diet: Dysphagia eval and advance as tolerated  GI prophylaxis [x] not indicated [] PPI [] other:  Bowel regimen [] colace [] senna [] other:    ENDO:   Goal euglycemia (-180)    HEME/ONC:  VTE prophylaxis: [] SCDs [] chemoprophylaxis [x] hold chemoprophylaxis due to: fresh post op [] high risk of DVT/PE on admission due to:  SCDs  Chemoppx pending CT head in AM    ID:  Afebrile  Perioperative abx  MRSA nares neg    MISC: Continue glaucoma medications    SOCIAL/FAMILY:  [] awaiting [x] updated at bedside [] family meeting    CODE STATUS:  [x] Full Code [] DNR [] DNI [] Palliative/Comfort Care    DISPOSITION:  [] ICU [] Stroke Unit [] Floor [] EMU [] RCU [] PCU, [x] PACU    [x] Patient is at high risk of neurologic deterioration/death due to: hemorrhage, stroke    Time seen:  Time spent: 35 critical care minutes    Contact: [phone]

## 2020-02-24 NOTE — PROGRESS NOTE ADULT - SUBJECTIVE AND OBJECTIVE BOX
HPI:  53 y/o F PMH CVA 2017 with no residual neurological deficits, S/P loop recorder placement after CVA, as per patient has not detected A fib, found to have stenosis of right MCA on imaging, S/P cerebral angiogram 1/09/20.  Presents today for right superficial temporal artery to middle cerebral artery bypass. (10 Feb 2020 07:32)    On admission, the patient was:  GCS: 15  Hunt-Lubin:  modified Hammond:  ICH score:  NIHSS:    *** HIGH RISK OF DVT PRESENT ON ADMISSION ***      ICU Vital Signs Last 24 Hrs  T(C): 36.7 (24 Feb 2020 07:23), Max: 36.7 (24 Feb 2020 07:23)  T(F): 98.1 (24 Feb 2020 07:23), Max: 98.1 (24 Feb 2020 07:23)  HR: 58 (24 Feb 2020 07:23) (58 - 58)  BP: 115/78 (24 Feb 2020 07:23) (115/78 - 115/78)  RR: 18 (24 Feb 2020 07:23) (18 - 18)  SpO2: 100% (24 Feb 2020 07:23) (100% - 100%)    ABG - ( 24 Feb 2020 15:01 )  pH, Arterial: 7.40  pH, Blood: x     /  pCO2: 38    /  pO2: 263   / HCO3: 23    / Base Excess: -1.3  /  SaO2: 99          MEDICATIONS  (STANDING):  aspirin  chewable 81 milliGRAM(s) Oral at bedtime  atorvastatin 80 milliGRAM(s) Oral at bedtime  cloNIDine 0.2 milliGRAM(s) Oral two times a day  latanoprost 0.005% Ophthalmic Solution 1 Drop(s) Both EYES at bedtime  losartan 100 milliGRAM(s) Oral daily  metoprolol tartrate 50 milliGRAM(s) Oral two times a day  multivitamin 1 Tablet(s) Oral daily  NIFEdipine XL 60 milliGRAM(s) Oral daily  sodium chloride 0.9%. 1000 milliLiter(s) (75 mL/Hr) IV Continuous <Continuous>  timolol 0.5% Solution 1 Drop(s) Both EYES two times a day  vancomycin  IVPB 1500 milliGRAM(s) IV Intermittent once      EXAMINATION:  General:  calm  HEENT:  MMM  Neuro:  awake, alert, oriented x 3, follows commands, moves all extremities *  Cards:  RRR  Respiratory:  no respiratory distress  Abdomen:  soft  Extremities:  no edema  Skin:  warm/dry

## 2020-02-24 NOTE — PROGRESS NOTE ADULT - ASSESSMENT
Right M1 stenosis status post bypass, post-operative day 0  - CT/A in AM  - ASA  - -160mmHg to balance brain perfusion with reperfusion injury  - Continue BP meds unless hypotensive  - Eye drops for glaucoma

## 2020-02-24 NOTE — CHART NOTE - NSCHARTNOTEFT_GEN_A_CORE
CAPRINI SCORE [CLOT] Score on Admission for     AGE RELATED RISK FACTORS                                                       MOBILITY RELATED FACTORS  [x] Age 41-60 years                                            (1 Point)                  [x] Bed rest                                                        (1 Point)  [ ] Age: 61-74 years                                           (2 Points)                 [ ] Plaster cast                                                   (2 Points)  [ ] Age= 75 years                                              (3 Points)                 [ ] Bed bound for more than 72 hours                 (2 Points)    DISEASE RELATED RISK FACTORS                                               GENDER SPECIFIC FACTORS  [ ] Edema in the lower extremities                       (1 Point)                  [ ] Pregnancy                                                     (1 Point)  [ ] Varicose veins                                               (1 Point)                  [ ] Post-partum < 6 weeks                                   (1 Point)             [x] BMI > 25 Kg/m2                                            (1 Point)                  [ ] Hormonal therapy  or oral contraception          (1 Point)                 [ ] Sepsis (in the previous month)                        (1 Point)                  [ ] History of pregnancy complications                 (1 point)  [ ] Pneumonia or serious lung disease                                               [ ] Unexplained or recurrent                     (1 Point)           (in the previous month)                               (1 Point)  [ ] Abnormal pulmonary function test                     (1 Point)                 SURGERY RELATED RISK FACTORS (include planned surgeries)  [ ] Acute myocardial infarction                              (1 Point)                 [ ]  Section                                             (1 Point)  [ ] Congestive heart failure (in the previous month)  (1 Point)         [ ] Minor surgery                                                  (1 Point)   [ ] Inflammatory bowel disease                             (1 Point)                 [ ] Arthroscopic surgery                                        (2 Points)  [ ] Central venous access                                      (2 Points)                [x] General surgery lasting more than 45 minutes   (2 Points)       [ ] Stroke (in the previous month)                          (5 Points)               [ ] Elective arthroplasty                                         (5 Points)            [ ] current or past malignancy                              (2 Points)                                                                                                       HEMATOLOGY RELATED FACTORS                                                 TRAUMA RELATED RISK FACTORS  [ ] Prior episodes of VTE                                     (3 Points)                [ ] Fracture of the hip, pelvis, or leg                       (5 Points)  [ ] Positive family history for VTE                         (3 Points)                 [ ] Acute spinal cord injury (in the previous month)  (5 Points)  [ ] Prothrombin 81320 A                                     (3 Points)                 [ ] Paralysis  (less than 1 month)                             (5 Points)  [ ] Factor V Leiden                                             (3 Points)                  [ ] Multiple Trauma within 1 month                        (5 Points)  [ ] Lupus anticoagulants                                     (3 Points)                                                           [ ] Anticardiolipin antibodies                               (3 Points)                                                       [ ] High homocysteine in the blood                      (3 Points)                                             [ ] Other congenital or acquired thrombophilia      (3 Points)                                                [ ] Heparin induced thrombocytopenia                  (3 Points)                                          Total Score [     5     ]    Risk:  Very low 0   Low 1 to 2   Moderate 3 to 4   High =5       VTE Prophylasix Recommednations:  [x] mechanical pneumatic compression devices                                      [ ] contraindicated: _____________________  [ ] chemo prophylasix                                                                                   [x] contraindicated : fresh postop    **** HIGH LIKELIHOOD DVT PRESENT ON ADMISSION  [x] (please order LE dopplers within 24 hours of admission)

## 2020-02-24 NOTE — PRE-ANESTHESIA EVALUATION ADULT - NSRADCARDRESULTSFT_GEN_ALL_CORE
NST 2/13/20- Negative fo ischemia/ normal myocardial perfusion scan  TTE 2/20- LVEF 65-69%, NML diastolic function, mild LVH

## 2020-02-24 NOTE — PRE-ANESTHESIA EVALUATION ADULT - NSANTHVITALSIGNSFT_GEN_ALL_CORE
ICU Vital Signs Last 24 Hrs  T(C): 36.7 (24 Feb 2020 07:23), Max: 36.7 (24 Feb 2020 07:23)  T(F): 98.1 (24 Feb 2020 07:23), Max: 98.1 (24 Feb 2020 07:23)  HR: 58 (24 Feb 2020 07:23) (58 - 58)  BP: 115/78 (24 Feb 2020 07:23) (115/78 - 115/78)  BP(mean): --  ABP: --  ABP(mean): --  RR: 18 (24 Feb 2020 07:23) (18 - 18)  SpO2: 100% (24 Feb 2020 07:23) (100% - 100%)

## 2020-02-25 LAB
ANION GAP SERPL CALC-SCNC: 12 MMOL/L — SIGNIFICANT CHANGE UP (ref 5–17)
BUN SERPL-MCNC: 7 MG/DL — SIGNIFICANT CHANGE UP (ref 7–23)
CALCIUM SERPL-MCNC: 8.7 MG/DL — SIGNIFICANT CHANGE UP (ref 8.4–10.5)
CHLORIDE SERPL-SCNC: 103 MMOL/L — SIGNIFICANT CHANGE UP (ref 96–108)
CO2 SERPL-SCNC: 21 MMOL/L — LOW (ref 22–31)
CREAT SERPL-MCNC: 0.53 MG/DL — SIGNIFICANT CHANGE UP (ref 0.5–1.3)
GLUCOSE BLDC GLUCOMTR-MCNC: 152 MG/DL — HIGH (ref 70–99)
GLUCOSE BLDC GLUCOMTR-MCNC: 155 MG/DL — HIGH (ref 70–99)
GLUCOSE BLDC GLUCOMTR-MCNC: 169 MG/DL — HIGH (ref 70–99)
GLUCOSE SERPL-MCNC: 134 MG/DL — HIGH (ref 70–99)
HCT VFR BLD CALC: 35.6 % — SIGNIFICANT CHANGE UP (ref 34.5–45)
HGB BLD-MCNC: 10.7 G/DL — LOW (ref 11.5–15.5)
MAGNESIUM SERPL-MCNC: 2.2 MG/DL — SIGNIFICANT CHANGE UP (ref 1.6–2.6)
MCHC RBC-ENTMCNC: 23.3 PG — LOW (ref 27–34)
MCHC RBC-ENTMCNC: 30.1 GM/DL — LOW (ref 32–36)
MCV RBC AUTO: 77.4 FL — LOW (ref 80–100)
NRBC # BLD: 0 /100 WBCS — SIGNIFICANT CHANGE UP (ref 0–0)
PHOSPHATE SERPL-MCNC: 3.8 MG/DL — SIGNIFICANT CHANGE UP (ref 2.5–4.5)
PLATELET # BLD AUTO: 263 K/UL — SIGNIFICANT CHANGE UP (ref 150–400)
POTASSIUM SERPL-MCNC: 3.5 MMOL/L — SIGNIFICANT CHANGE UP (ref 3.5–5.3)
POTASSIUM SERPL-SCNC: 3.5 MMOL/L — SIGNIFICANT CHANGE UP (ref 3.5–5.3)
RBC # BLD: 4.6 M/UL — SIGNIFICANT CHANGE UP (ref 3.8–5.2)
RBC # FLD: 15.5 % — HIGH (ref 10.3–14.5)
SODIUM SERPL-SCNC: 136 MMOL/L — SIGNIFICANT CHANGE UP (ref 135–145)
WBC # BLD: 9.54 K/UL — SIGNIFICANT CHANGE UP (ref 3.8–10.5)
WBC # FLD AUTO: 9.54 K/UL — SIGNIFICANT CHANGE UP (ref 3.8–10.5)

## 2020-02-25 PROCEDURE — 99291 CRITICAL CARE FIRST HOUR: CPT

## 2020-02-25 PROCEDURE — 70496 CT ANGIOGRAPHY HEAD: CPT | Mod: 26

## 2020-02-25 PROCEDURE — 93970 EXTREMITY STUDY: CPT | Mod: 26

## 2020-02-25 RX ORDER — ENOXAPARIN SODIUM 100 MG/ML
40 INJECTION SUBCUTANEOUS
Refills: 0 | Status: DISCONTINUED | OUTPATIENT
Start: 2020-02-25 | End: 2020-02-27

## 2020-02-25 RX ORDER — SODIUM CHLORIDE 9 MG/ML
500 INJECTION INTRAMUSCULAR; INTRAVENOUS; SUBCUTANEOUS ONCE
Refills: 0 | Status: COMPLETED | OUTPATIENT
Start: 2020-02-25 | End: 2020-02-25

## 2020-02-25 RX ORDER — POTASSIUM CHLORIDE 20 MEQ
40 PACKET (EA) ORAL ONCE
Refills: 0 | Status: COMPLETED | OUTPATIENT
Start: 2020-02-25 | End: 2020-02-25

## 2020-02-25 RX ORDER — ACETAMINOPHEN 500 MG
1000 TABLET ORAL ONCE
Refills: 0 | Status: COMPLETED | OUTPATIENT
Start: 2020-02-25 | End: 2020-02-25

## 2020-02-25 RX ORDER — HYDRALAZINE HCL 50 MG
10 TABLET ORAL ONCE
Refills: 0 | Status: COMPLETED | OUTPATIENT
Start: 2020-02-25 | End: 2020-02-25

## 2020-02-25 RX ORDER — POTASSIUM CHLORIDE 20 MEQ
40 PACKET (EA) ORAL ONCE
Refills: 0 | Status: DISCONTINUED | OUTPATIENT
Start: 2020-02-25 | End: 2020-02-25

## 2020-02-25 RX ADMIN — OXYCODONE HYDROCHLORIDE 10 MILLIGRAM(S): 5 TABLET ORAL at 21:36

## 2020-02-25 RX ADMIN — Medication 1 DROP(S): at 05:43

## 2020-02-25 RX ADMIN — OXYCODONE HYDROCHLORIDE 10 MILLIGRAM(S): 5 TABLET ORAL at 05:22

## 2020-02-25 RX ADMIN — Medication 60 MILLIGRAM(S): at 11:03

## 2020-02-25 RX ADMIN — Medication 1 DROP(S): at 18:42

## 2020-02-25 RX ADMIN — SODIUM CHLORIDE 500 MILLILITER(S): 9 INJECTION INTRAMUSCULAR; INTRAVENOUS; SUBCUTANEOUS at 13:19

## 2020-02-25 RX ADMIN — Medication 10 MILLIGRAM(S): at 23:06

## 2020-02-25 RX ADMIN — Medication 300 MILLIGRAM(S): at 08:17

## 2020-02-25 RX ADMIN — ENOXAPARIN SODIUM 40 MILLIGRAM(S): 100 INJECTION SUBCUTANEOUS at 18:41

## 2020-02-25 RX ADMIN — Medication 650 MILLIGRAM(S): at 22:20

## 2020-02-25 RX ADMIN — Medication 650 MILLIGRAM(S): at 22:50

## 2020-02-25 RX ADMIN — Medication 400 MILLIGRAM(S): at 01:56

## 2020-02-25 RX ADMIN — OXYCODONE HYDROCHLORIDE 10 MILLIGRAM(S): 5 TABLET ORAL at 05:52

## 2020-02-25 RX ADMIN — Medication 1000 MILLIGRAM(S): at 02:26

## 2020-02-25 RX ADMIN — Medication 0.2 MILLIGRAM(S): at 08:17

## 2020-02-25 RX ADMIN — Medication 1 TABLET(S): at 11:16

## 2020-02-25 RX ADMIN — Medication 1: at 11:22

## 2020-02-25 RX ADMIN — OXYCODONE HYDROCHLORIDE 10 MILLIGRAM(S): 5 TABLET ORAL at 21:06

## 2020-02-25 RX ADMIN — Medication 1: at 16:17

## 2020-02-25 RX ADMIN — Medication 1: at 06:40

## 2020-02-25 RX ADMIN — ATORVASTATIN CALCIUM 80 MILLIGRAM(S): 80 TABLET, FILM COATED ORAL at 21:41

## 2020-02-25 RX ADMIN — Medication 300 MILLIGRAM(S): at 22:59

## 2020-02-25 RX ADMIN — LOSARTAN POTASSIUM 100 MILLIGRAM(S): 100 TABLET, FILM COATED ORAL at 05:42

## 2020-02-25 RX ADMIN — OXYCODONE HYDROCHLORIDE 10 MILLIGRAM(S): 5 TABLET ORAL at 18:20

## 2020-02-25 RX ADMIN — OXYCODONE HYDROCHLORIDE 5 MILLIGRAM(S): 5 TABLET ORAL at 15:56

## 2020-02-25 RX ADMIN — Medication 40 MILLIEQUIVALENT(S): at 11:16

## 2020-02-25 RX ADMIN — OXYCODONE HYDROCHLORIDE 10 MILLIGRAM(S): 5 TABLET ORAL at 11:10

## 2020-02-25 RX ADMIN — OXYCODONE HYDROCHLORIDE 10 MILLIGRAM(S): 5 TABLET ORAL at 10:20

## 2020-02-25 RX ADMIN — Medication 81 MILLIGRAM(S): at 21:40

## 2020-02-25 RX ADMIN — SODIUM CHLORIDE 100 MILLILITER(S): 9 INJECTION INTRAMUSCULAR; INTRAVENOUS; SUBCUTANEOUS at 00:00

## 2020-02-25 RX ADMIN — LATANOPROST 1 DROP(S): 0.05 SOLUTION/ DROPS OPHTHALMIC; TOPICAL at 21:41

## 2020-02-25 NOTE — PHYSICAL THERAPY INITIAL EVALUATION ADULT - PLANNED THERAPY INTERVENTIONS, PT EVAL
transfer training/Stair Training: Goal: Improve to 10 steps I with single rail, step to pattern by 2 weeks./gait training

## 2020-02-25 NOTE — PHYSICAL THERAPY INITIAL EVALUATION ADULT - PERTINENT HX OF CURRENT PROBLEM, REHAB EVAL
Pt is a 53 y/o F PMH CVA 2017 with no residual neurological deficits, S/P loop recorder placement after CVA, as per patient has not detected A fib, found to have stenosis of right MCA on imaging, S/P cerebral angiogram 1/09/20.  Presents today for right superficial temporal artery to middle cerebral artery bypass.

## 2020-02-25 NOTE — PROGRESS NOTE ADULT - ASSESSMENT
Right M1 stenosis status post bypass, post-operative day 1  - CT/A in AM  - ASA  - -160mmHg to balance brain perfusion with reperfusion injury; maintaining without acute intervention  - Continue BP meds unless hypotensive  - Eye drops for glaucoma  - awaiting floor transfer per neurosurgery

## 2020-02-25 NOTE — PHYSICAL THERAPY INITIAL EVALUATION ADULT - ADDITIONAL COMMENTS
PTA pt lived in 2nd floor of pvt home with mother (able to assist at all times) and , 5 steps to enter +HR, flight to 2nd floor +HR, tub shower, independent without AD.

## 2020-02-25 NOTE — PROGRESS NOTE ADULT - SUBJECTIVE AND OBJECTIVE BOX
HPI:  53 y/o F PMH CVA 2017 with no residual neurological deficits, S/P loop recorder placement after CVA, as per patient has not detected A fib, found to have stenosis of right MCA on imaging, S/P cerebral angiogram 1/09/20.  Presents today for right superficial temporal artery to middle cerebral artery bypass. (10 Feb 2020 07:32)    On admission, the patient was:  GCS: 15  Hunt-Lubin:  modified Hammond:  ICH score:  NIHSS:    *** HIGH RISK OF DVT PRESENT ON ADMISSION ***      ICU Vital Signs Last 24 Hrs  T(C): 36.7 (24 Feb 2020 07:23), Max: 36.7 (24 Feb 2020 07:23)  T(F): 98.1 (24 Feb 2020 07:23), Max: 98.1 (24 Feb 2020 07:23)  HR: 58 (24 Feb 2020 07:23) (58 - 58)  BP: 115/78 (24 Feb 2020 07:23) (115/78 - 115/78)  RR: 18 (24 Feb 2020 07:23) (18 - 18)  SpO2: 100% (24 Feb 2020 07:23) (100% - 100%)    ABG - ( 24 Feb 2020 15:01 )  pH, Arterial: 7.40  pH, Blood: x     /  pCO2: 38    /  pO2: 263   / HCO3: 23    / Base Excess: -1.3  /  SaO2: 99          MEDICATIONS  (STANDING):  aspirin  chewable 81 milliGRAM(s) Oral at bedtime  atorvastatin 80 milliGRAM(s) Oral at bedtime  cloNIDine 0.2 milliGRAM(s) Oral two times a day  latanoprost 0.005% Ophthalmic Solution 1 Drop(s) Both EYES at bedtime  losartan 100 milliGRAM(s) Oral daily  metoprolol tartrate 50 milliGRAM(s) Oral two times a day  multivitamin 1 Tablet(s) Oral daily  NIFEdipine XL 60 milliGRAM(s) Oral daily  sodium chloride 0.9%. 1000 milliLiter(s) (75 mL/Hr) IV Continuous <Continuous>  timolol 0.5% Solution 1 Drop(s) Both EYES two times a day  vancomycin  IVPB 1500 milliGRAM(s) IV Intermittent once      EXAMINATION:  General:  calm  HEENT:  MMM  Neuro:  awake, alert, oriented x 3, follows commands, moves all extremities *  Cards:  RRR  Respiratory:  no respiratory distress  Abdomen:  soft  Extremities:  no edema  Skin:  warm/dry HPI:  55 y/o F PMH CVA 2017 with no residual neurological deficits, S/P loop recorder placement after CVA, as per patient has not detected A fib, found to have stenosis of right MCA on imaging, S/P cerebral angiogram 1/09/20.  Presents today for right superficial temporal artery to middle cerebral artery bypass. (10 Feb 2020 07:32)    On admission, the patient was:  GCS: 15  Hunt-Lubin:  modified Hammond:  ICH score:  NIHSS:    *** HIGH RISK OF DVT PRESENT ON ADMISSION ***      ICU Vital Signs Last 24 Hrs  T(C): 36.7 (24 Feb 2020 07:23), Max: 36.7 (24 Feb 2020 07:23)  T(F): 98.1 (24 Feb 2020 07:23), Max: 98.1 (24 Feb 2020 07:23)  HR: 58 (24 Feb 2020 07:23) (58 - 58)  BP: 115/78 (24 Feb 2020 07:23) (115/78 - 115/78)  RR: 18 (24 Feb 2020 07:23) (18 - 18)  SpO2: 100% (24 Feb 2020 07:23) (100% - 100%)    ABG - ( 24 Feb 2020 15:01 )  pH, Arterial: 7.40  pH, Blood: x     /  pCO2: 38    /  pO2: 263   / HCO3: 23    / Base Excess: -1.3  /  SaO2: 99          MEDICATIONS  (STANDING):  aspirin  chewable 81 milliGRAM(s) Oral at bedtime  atorvastatin 80 milliGRAM(s) Oral at bedtime  cloNIDine 0.2 milliGRAM(s) Oral two times a day  latanoprost 0.005% Ophthalmic Solution 1 Drop(s) Both EYES at bedtime  losartan 100 milliGRAM(s) Oral daily  metoprolol tartrate 50 milliGRAM(s) Oral two times a day  multivitamin 1 Tablet(s) Oral daily  NIFEdipine XL 60 milliGRAM(s) Oral daily  sodium chloride 0.9%. 1000 milliLiter(s) (75 mL/Hr) IV Continuous <Continuous>  timolol 0.5% Solution 1 Drop(s) Both EYES two times a day  vancomycin  IVPB 1500 milliGRAM(s) IV Intermittent once    CTA Head  Evidence for interval direct right superficial temporal artery to right MCA bypass.    Similar Similar appearing lack of flow related enhancement of the mid and distal right M1 segment and right M1 bifurcation. Flow-related enhancement of more distal right MCA branches is present.    VA Duplex Lower Ext Vein Scan, Bilat (02.25.20 @ 12:03) >  IMPRESSION:     No evidence of deep venous thrombosis in either lower extremity.            EXAMINATION:  General:  calm  HEENT:  M< from: CT Angio Head w/ IV Cont (02.25.20 @ 09:43) >  MM, R facial swelling  Neuro:  awake, alert, oriented x 3, follows commands, moves all extremities 4.5/5 B/L UE and LE  Cards:  RRR  Respiratory:  no respiratory distress  Abdomen:  soft  Extremities:  no edema  Skin:  warm/dry

## 2020-02-25 NOTE — PROGRESS NOTE ADULT - ASSESSMENT
ASSESSMENT/PLAN: S/P right superficial temporal artery to middle cerebral artery bypass. POD 0    NEURO:  Q1 neurochecks  CT/CTA head 2/25:  ASA 81   Continue atorvastatin  Activity: [] mobilize as tolerated [] Bedrest [] PT [] OT [] PMNR    PULM:  Incentive spirometry  ABG wnl  Keep sats >92%    CV:  SBP goal 120- 160  Continue antihypertensives (clonidine, losartan, nifedipine, metoprolol)    RENAL:  Fluids: NS 75  Check post op labs (K 3.2 intraop)  Lactate wnl    GI:  Diet: Dysphagia eval and advance as tolerated  GI prophylaxis [x] not indicated [] PPI [] other:  Bowel regimen [] colace [] senna [] other:    ENDO:   Goal euglycemia (-180)    HEME/ONC:  VTE prophylaxis: [] SCDs [] chemoprophylaxis [x] hold chemoprophylaxis due to: fresh post op [] high risk of DVT/PE on admission due to:  SCDs  Chemoppx pending CT head in AM    ID:  Afebrile  Perioperative abx  MRSA nares neg    MISC: Continue glaucoma medications    SOCIAL/FAMILY:  [] awaiting [x] updated at bedside [] family meeting    CODE STATUS:  [x] Full Code [] DNR [] DNI [] Palliative/Comfort Care    DISPOSITION:  [] ICU [] Stroke Unit [] Floor [] EMU [] RCU [] PCU, [x] PACU    [x] Patient is at high risk of neurologic deterioration/death due to: hemorrhage, stroke    Time seen:  Time spent: 35 critical care minutes    Contact: [phone] ASSESSMENT/PLAN: S/P right superficial temporal artery to middle cerebral artery bypass. POD #1    NEURO:  Q2 neurochecks  CT/CTA head 2/25: as above  ASA 81   Continue atorvastatin  Activity:  [] Bedrest [X] PT []x OT [] PMNR    PULM:  Incentive spirometry  ABG wnl  Keep sats >92%    CV:  SBP goal 120- 160  Continue antihypertensives (clonidine, losartan, nifedipine, metoprolol)    RENAL:  Fluids: NS 75  Check post op labs (K 3.2 intraop)  Lactate wnl    GI:  Diet: Dysphagia eval and advance as tolerated  GI prophylaxis [x] not indicated [] PPI [] other:  Bowel regimen [] colace [] senna [] other:    ENDO:   Goal euglycemia (-180)    HEME/ONC:  VTE prophylaxis: [] SCDs  [x]chemoprophylaxis due to: fresh post op - lovenox  SCDs      ID:  Afebrile  Perioperative abx  MRSA nares neg    MISC: Continue glaucoma medications    SOCIAL/FAMILY:  [] awaiting [x] updated at bedside [] family meeting    CODE STATUS:  [x] Full Code    DISPOSITION:  [x] ICU     [x] Patient is at high risk of neurologic deterioration/death due to: hemorrhage, stroke    Time seen:  Time spent: 35 critical care minutes    Contact: [phone]

## 2020-02-26 DIAGNOSIS — D86.9 SARCOIDOSIS, UNSPECIFIED: ICD-10-CM

## 2020-02-26 DIAGNOSIS — E66.9 OBESITY, UNSPECIFIED: ICD-10-CM

## 2020-02-26 DIAGNOSIS — E78.5 HYPERLIPIDEMIA, UNSPECIFIED: ICD-10-CM

## 2020-02-26 DIAGNOSIS — I63.9 CEREBRAL INFARCTION, UNSPECIFIED: ICD-10-CM

## 2020-02-26 DIAGNOSIS — H40.9 UNSPECIFIED GLAUCOMA: ICD-10-CM

## 2020-02-26 DIAGNOSIS — I10 ESSENTIAL (PRIMARY) HYPERTENSION: ICD-10-CM

## 2020-02-26 LAB
ANION GAP SERPL CALC-SCNC: 6 MMOL/L — SIGNIFICANT CHANGE UP (ref 5–17)
BUN SERPL-MCNC: 7 MG/DL — SIGNIFICANT CHANGE UP (ref 7–23)
CALCIUM SERPL-MCNC: 8.4 MG/DL — SIGNIFICANT CHANGE UP (ref 8.4–10.5)
CHLORIDE SERPL-SCNC: 107 MMOL/L — SIGNIFICANT CHANGE UP (ref 96–108)
CO2 SERPL-SCNC: 22 MMOL/L — SIGNIFICANT CHANGE UP (ref 22–31)
CREAT SERPL-MCNC: 0.61 MG/DL — SIGNIFICANT CHANGE UP (ref 0.5–1.3)
GLUCOSE BLDC GLUCOMTR-MCNC: 137 MG/DL — HIGH (ref 70–99)
GLUCOSE BLDC GLUCOMTR-MCNC: 141 MG/DL — HIGH (ref 70–99)
GLUCOSE BLDC GLUCOMTR-MCNC: 142 MG/DL — HIGH (ref 70–99)
GLUCOSE BLDC GLUCOMTR-MCNC: 210 MG/DL — HIGH (ref 70–99)
GLUCOSE SERPL-MCNC: 165 MG/DL — HIGH (ref 70–99)
MAGNESIUM SERPL-MCNC: 1.7 MG/DL — SIGNIFICANT CHANGE UP (ref 1.6–2.6)
PHOSPHATE SERPL-MCNC: 2.5 MG/DL — SIGNIFICANT CHANGE UP (ref 2.5–4.5)
POTASSIUM SERPL-MCNC: 3.9 MMOL/L — SIGNIFICANT CHANGE UP (ref 3.5–5.3)
POTASSIUM SERPL-SCNC: 3.9 MMOL/L — SIGNIFICANT CHANGE UP (ref 3.5–5.3)
SODIUM SERPL-SCNC: 135 MMOL/L — SIGNIFICANT CHANGE UP (ref 135–145)

## 2020-02-26 PROCEDURE — 99223 1ST HOSP IP/OBS HIGH 75: CPT

## 2020-02-26 PROCEDURE — 99233 SBSQ HOSP IP/OBS HIGH 50: CPT

## 2020-02-26 RX ORDER — HYDRALAZINE HCL 50 MG
5 TABLET ORAL ONCE
Refills: 0 | Status: COMPLETED | OUTPATIENT
Start: 2020-02-26 | End: 2020-02-26

## 2020-02-26 RX ORDER — SODIUM CHLORIDE 9 MG/ML
500 INJECTION INTRAMUSCULAR; INTRAVENOUS; SUBCUTANEOUS ONCE
Refills: 0 | Status: COMPLETED | OUTPATIENT
Start: 2020-02-26 | End: 2020-02-26

## 2020-02-26 RX ORDER — METOPROLOL TARTRATE 50 MG
50 TABLET ORAL
Refills: 0 | Status: DISCONTINUED | OUTPATIENT
Start: 2020-02-26 | End: 2020-02-27

## 2020-02-26 RX ORDER — HYDRALAZINE HCL 50 MG
10 TABLET ORAL ONCE
Refills: 0 | Status: COMPLETED | OUTPATIENT
Start: 2020-02-26 | End: 2020-02-26

## 2020-02-26 RX ORDER — LOSARTAN POTASSIUM 100 MG/1
100 TABLET, FILM COATED ORAL DAILY
Refills: 0 | Status: DISCONTINUED | OUTPATIENT
Start: 2020-02-26 | End: 2020-02-27

## 2020-02-26 RX ORDER — NIFEDIPINE 30 MG
60 TABLET, EXTENDED RELEASE 24 HR ORAL DAILY
Refills: 0 | Status: DISCONTINUED | OUTPATIENT
Start: 2020-02-26 | End: 2020-02-27

## 2020-02-26 RX ADMIN — Medication 5 MILLIGRAM(S): at 00:39

## 2020-02-26 RX ADMIN — LOSARTAN POTASSIUM 100 MILLIGRAM(S): 100 TABLET, FILM COATED ORAL at 06:06

## 2020-02-26 RX ADMIN — OXYCODONE HYDROCHLORIDE 10 MILLIGRAM(S): 5 TABLET ORAL at 16:00

## 2020-02-26 RX ADMIN — LATANOPROST 1 DROP(S): 0.05 SOLUTION/ DROPS OPHTHALMIC; TOPICAL at 21:22

## 2020-02-26 RX ADMIN — Medication 0.2 MILLIGRAM(S): at 06:06

## 2020-02-26 RX ADMIN — Medication 650 MILLIGRAM(S): at 21:52

## 2020-02-26 RX ADMIN — Medication 50 MILLIGRAM(S): at 17:23

## 2020-02-26 RX ADMIN — SODIUM CHLORIDE 1000 MILLILITER(S): 9 INJECTION INTRAMUSCULAR; INTRAVENOUS; SUBCUTANEOUS at 07:45

## 2020-02-26 RX ADMIN — Medication 1 TABLET(S): at 11:53

## 2020-02-26 RX ADMIN — Medication 0.2 MILLIGRAM(S): at 17:23

## 2020-02-26 RX ADMIN — Medication 650 MILLIGRAM(S): at 04:50

## 2020-02-26 RX ADMIN — ATORVASTATIN CALCIUM 80 MILLIGRAM(S): 80 TABLET, FILM COATED ORAL at 21:22

## 2020-02-26 RX ADMIN — SODIUM CHLORIDE 100 MILLILITER(S): 9 INJECTION INTRAMUSCULAR; INTRAVENOUS; SUBCUTANEOUS at 06:07

## 2020-02-26 RX ADMIN — Medication 60 MILLIGRAM(S): at 06:06

## 2020-02-26 RX ADMIN — Medication 650 MILLIGRAM(S): at 21:22

## 2020-02-26 RX ADMIN — Medication 650 MILLIGRAM(S): at 05:20

## 2020-02-26 RX ADMIN — Medication 81 MILLIGRAM(S): at 19:59

## 2020-02-26 RX ADMIN — Medication 650 MILLIGRAM(S): at 11:36

## 2020-02-26 RX ADMIN — OXYCODONE HYDROCHLORIDE 10 MILLIGRAM(S): 5 TABLET ORAL at 16:30

## 2020-02-26 RX ADMIN — Medication 10 MILLIGRAM(S): at 01:35

## 2020-02-26 RX ADMIN — Medication 1 DROP(S): at 06:06

## 2020-02-26 RX ADMIN — SODIUM CHLORIDE 100 MILLILITER(S): 9 INJECTION INTRAMUSCULAR; INTRAVENOUS; SUBCUTANEOUS at 21:22

## 2020-02-26 RX ADMIN — Medication 1 DROP(S): at 17:24

## 2020-02-26 RX ADMIN — Medication 50 MILLIGRAM(S): at 06:06

## 2020-02-26 RX ADMIN — Medication 650 MILLIGRAM(S): at 12:06

## 2020-02-26 RX ADMIN — ENOXAPARIN SODIUM 40 MILLIGRAM(S): 100 INJECTION SUBCUTANEOUS at 17:23

## 2020-02-26 RX ADMIN — Medication 0.2 MILLIGRAM(S): at 00:40

## 2020-02-26 NOTE — PROGRESS NOTE ADULT - SUBJECTIVE AND OBJECTIVE BOX
HPI:  55 y/o F PMH CVA 2017 with no residual neurological deficits, S/P loop recorder placement after CVA, as per patient has not detected A fib, found to have stenosis of right MCA on imaging, S/P cerebral angiogram 1/09/20.  Presents today for right superficial temporal artery to middle cerebral artery bypass. (10 Feb 2020 07:32)    On admission, the patient was:  GCS: 15  Hunt-Lubin:  modified Hammond:  ICH score:  NIHSS:    *** HIGH RISK OF DVT PRESENT ON ADMISSION ***        ICU Vital Signs Last 24 Hrs  T(C): 36.3 (26 Feb 2020 04:00), Max: 36.7 (25 Feb 2020 16:00)  T(F): 97.3 (26 Feb 2020 04:00), Max: 98.1 (25 Feb 2020 16:00)  HR: 69 (26 Feb 2020 06:00) (44 - 69)  BP: 152/80 (26 Feb 2020 06:00) (105/52 - 196/88)  BP(mean): 107 (26 Feb 2020 06:00) (75 - 119)  ABP: 158/60 (25 Feb 2020 08:00) (156/57 - 158/60)  ABP(mean): 97 (25 Feb 2020 08:00) (92 - 97)  RR: 16 (26 Feb 2020 07:00) (16 - 20)  SpO2: 100% (26 Feb 2020 06:00) (97% - 100%)      02-25-20 @ 07:01  -  02-26-20 @ 07:00  --------------------------------------------------------  IN: 5280 mL / OUT: 2920 mL / NET: 2360 mL        acetaminophen   Tablet .. 650 milliGRAM(s) Oral every 6 hours PRN  aspirin  chewable 81 milliGRAM(s) Oral every 24 hours  atorvastatin 80 milliGRAM(s) Oral at bedtime  cloNIDine 0.2 milliGRAM(s) Oral two times a day  dextrose 40% Gel 15 Gram(s) Oral once PRN  dextrose 5%. 1000 milliLiter(s) (50 mL/Hr) IV Continuous <Continuous>  dextrose 50% Injectable 12.5 Gram(s) IV Push once  dextrose 50% Injectable 25 Gram(s) IV Push once  dextrose 50% Injectable 25 Gram(s) IV Push once  enoxaparin Injectable 40 milliGRAM(s) SubCutaneous <User Schedule>  glucagon  Injectable 1 milliGRAM(s) IntraMuscular once PRN  insulin lispro (HumaLOG) corrective regimen sliding scale   SubCutaneous three times a day before meals  latanoprost 0.005% Ophthalmic Solution 1 Drop(s) Both EYES at bedtime  losartan 100 milliGRAM(s) Oral daily  metoprolol tartrate 50 milliGRAM(s) Oral two times a day  multivitamin 1 Tablet(s) Oral daily  NIFEdipine XL 60 milliGRAM(s) Oral daily  ondansetron Injectable 4 milliGRAM(s) IV Push every 4 hours PRN  oxyCODONE    IR 5 milliGRAM(s) Oral every 4 hours PRN  oxyCODONE    IR 10 milliGRAM(s) Oral every 4 hours PRN  sodium chloride 0.9%. 1000 milliLiter(s) (100 mL/Hr) IV Continuous <Continuous>  timolol 0.5% Solution 1 Drop(s) Both EYES two times a day                          10.7   9.54  )-----------( 263      ( 25 Feb 2020 05:46 )             35.6     02-26    135  |  107  |  7   ----------------------------<  165<H>  3.9   |  22  |  0.61    Ca    8.4      26 Feb 2020 03:16  Phos  2.5     02-26  Mg     1.7     02-26        ABG - ( 24 Feb 2020 15:01 )  pH, Arterial: 7.40  pH, Blood: x     /  pCO2: 38    /  pO2: 263   / HCO3: 23    / Base Excess: -1.3  /  SaO2: 99                      CTA Head  Evidence for interval direct right superficial temporal artery to right MCA bypass.    Similar Similar appearing lack of flow related enhancement of the mid and distal right M1 segment and right M1 bifurcation. Flow-related enhancement of more distal right MCA branches is present.    VA Duplex Lower Ext Vein Scan, Bilat (02.25.20 @ 12:03) >  IMPRESSION:     No evidence of deep venous thrombosis in either lower extremity.            EXAMINATION:  General:  calm  HEENT:  M< from: CT Angio Head w/ IV Cont (02.25.20 @ 09:43) >  MM, R facial swelling  Neuro:  awake, alert, oriented x 3, follows commands, moves all extremities 4.5/5 B/L UE and LE  Cards:  RRR  Respiratory:  no respiratory distress  Abdomen:  soft  Extremities:  no edema  Skin:  warm/dry

## 2020-02-26 NOTE — CONSULT NOTE ADULT - PROBLEM SELECTOR RECOMMENDATION 2
-Hold home metformin and Jardiance while admitted  -Goal FS between 140-180, maintain euglycemia with coverage  -If FS persistently high, would add low dose basal insulin initially

## 2020-02-26 NOTE — CONSULT NOTE ADULT - ASSESSMENT
54F with PMHx of right CVA (with resolved left sided deficits), HTN, T2DM, Obesity (post-sleeve gastrectomy), sarcoidosis, and glaucoma presents for elective bypass of right MCA. Surgery uneventful, but hospital course complicated by pneumocephalus. Internal medicine following for co-management.

## 2020-02-26 NOTE — CONSULT NOTE ADULT - ATTENDING COMMENTS
Any questions overnight please page me at [phone]. Patient to be follows by daytime hospitalist assigned to neurosurgery co-management. Any questions overnight please page me at [phone]. Patient to be followed by daytime hospitalist assigned to neurosurgery co-management.

## 2020-02-26 NOTE — CONSULT NOTE ADULT - PROBLEM SELECTOR RECOMMENDATION 4
-Continue with secondary prevention medications: baby aspirin and high dose statin  -No history of cardiac arrythmia per chart review

## 2020-02-26 NOTE — CONSULT NOTE ADULT - PROBLEM SELECTOR RECOMMENDATION 8
-Consider nutrition consult  -Patient is post-sleeve gastrectomy, continue with multivitamin supplementation

## 2020-02-26 NOTE — PROGRESS NOTE ADULT - ASSESSMENT
ASSESSMENT/PLAN: S/P right superficial temporal artery to middle cerebral artery bypass. POD #1    NEURO:  Q2 neurochecks  CT/CTA head 2/25: as above  ASA 81   Continue atorvastatin  Activity:  [] Bedrest [X] PT []x OT [] PMNR    PULM:  Incentive spirometry  ABG wnl  Keep sats >92%    CV:  SBP goal 120- 160  Continue antihypertensives (clonidine, losartan, nifedipine, metoprolol)    RENAL:  Fluids: NS 75  Check post op labs (K 3.2 intraop)  Lactate wnl    GI:  Diet: Dysphagia eval and advance as tolerated  GI prophylaxis [x] not indicated [] PPI [] other:  Bowel regimen [] colace [] senna [] other:    ENDO:   Goal euglycemia (-180)    HEME/ONC:  VTE prophylaxis: [] SCDs  [x]chemoprophylaxis due to: fresh post op - lovenox  SCDs      ID:  Afebrile  Perioperative abx  MRSA nares neg    MISC: Continue glaucoma medications    SOCIAL/FAMILY:  [] awaiting [x] updated at bedside [] family meeting    CODE STATUS:  [x] Full Code    DISPOSITION:  [x] ICU     [x] Patient is at high risk of neurologic deterioration/death due to: hemorrhage, stroke    Time seen:  Time spent: 35 critical care minutes    Contact: [phone]

## 2020-02-26 NOTE — CONSULT NOTE ADULT - PROBLEM SELECTOR RECOMMENDATION 3
-No active treatment per patient, under surveillance, no hypercalcemia  -Outpatient ophthalmology follow up

## 2020-02-26 NOTE — CONSULT NOTE ADULT - SUBJECTIVE AND OBJECTIVE BOX
Gen: no night sweats or change in appetite  Eyes: no changes in vision or diplopia   ENT: no epistaxis, sinus pain, gingival bleeding, odynophagia or dysphagia  CV: no CP, PND or palpitations  Resp: no cough, wheezing, or hemoptysis  GI: no hematemesis, hematochezia, or melena  : no dysuria, polyuria, or hematuria  MSK: no arthralgias or joint swelling   Neuro: no gross sensory changes, numbness, focal deficits  Psych: no depression or changes in concentration  Heme/Onc: no purpura, petechiae or night sweats  Skin: no pruritus, hair loss or skin lesions  All: no photosensitivity, no complaints of anaphylaxis (SOB, throat swelling)      T(C): 36 (02-26-20 @ 16:00), Max: 36.4 (02-26-20 @ 07:00)  HR: 67 (02-26-20 @ 19:30) (50 - 69)  BP: 109/58 (02-26-20 @ 19:30) (106/58 - 196/88)  RR: 16 (02-26-20 @ 19:30) (14 - 17)  SpO2: 98% (02-26-20 @ 19:30) (97% - 100%)    Gen: (fe)male in NAD, appears comfortable, no diaphoresis  HEENT: NCAT, MMM, neck soft and supple  CV: +S1/S2, no m/r/g  Resp: CTAB, no w/r/r  GI: normoactive BS, soft, NTND, no rebounding/guarding  Ext: No LE edema, extremities appear warm and well perfused   Neuro: AOx3, no focal deficits, CNII-XII grossly intact  Psych: No SI/HI/AVH, appropriate affect  Skin: no petechiae, ecchymosis or maculopapular rash noted    PROGRESS NOTE:     Patient is a 54y old  Female who presents with a chief complaint of I have a thin vein in my brain, I'm having a bypass. (10 Feb 2020 07:32)      SUBJECTIVE / OVERNIGHT EVENTS:    ADDITIONAL REVIEW OF SYSTEMS:    MEDICATIONS  (STANDING):  aspirin  chewable 81 milliGRAM(s) Oral every 24 hours  atorvastatin 80 milliGRAM(s) Oral at bedtime  cloNIDine 0.2 milliGRAM(s) Oral two times a day  dextrose 5%. 1000 milliLiter(s) (50 mL/Hr) IV Continuous <Continuous>  dextrose 50% Injectable 12.5 Gram(s) IV Push once  dextrose 50% Injectable 25 Gram(s) IV Push once  dextrose 50% Injectable 25 Gram(s) IV Push once  enoxaparin Injectable 40 milliGRAM(s) SubCutaneous <User Schedule>  insulin lispro (HumaLOG) corrective regimen sliding scale   SubCutaneous three times a day before meals  latanoprost 0.005% Ophthalmic Solution 1 Drop(s) Both EYES at bedtime  losartan 100 milliGRAM(s) Oral daily  metoprolol tartrate 50 milliGRAM(s) Oral two times a day  multivitamin 1 Tablet(s) Oral daily  NIFEdipine XL 60 milliGRAM(s) Oral daily  sodium chloride 0.9%. 1000 milliLiter(s) (100 mL/Hr) IV Continuous <Continuous>  timolol 0.5% Solution 1 Drop(s) Both EYES two times a day    MEDICATIONS  (PRN):  acetaminophen   Tablet .. 650 milliGRAM(s) Oral every 6 hours PRN Temp greater or equal to 38C (100.4F), Mild Pain (1 - 3)  dextrose 40% Gel 15 Gram(s) Oral once PRN Blood Glucose LESS THAN 70 milliGRAM(s)/deciliter  glucagon  Injectable 1 milliGRAM(s) IntraMuscular once PRN Glucose LESS THAN 70 milligrams/deciliter  oxyCODONE    IR 5 milliGRAM(s) Oral every 4 hours PRN Moderate Pain (4 - 6)  oxyCODONE    IR 10 milliGRAM(s) Oral every 4 hours PRN Severe Pain (7 - 10)      CAPILLARY BLOOD GLUCOSE      POCT Blood Glucose.: 142 mg/dL (26 Feb 2020 17:03)  POCT Blood Glucose.: 141 mg/dL (26 Feb 2020 11:16)  POCT Blood Glucose.: 137 mg/dL (26 Feb 2020 07:34)    I&O's Summary    25 Feb 2020 07:01  -  26 Feb 2020 07:00  --------------------------------------------------------  IN: 5480 mL / OUT: 2920 mL / NET: 2560 mL    26 Feb 2020 07:01  -  26 Feb 2020 20:43  --------------------------------------------------------  IN: 1500 mL / OUT: 1100 mL / NET: 400 mL        PHYSICAL EXAM:  Vital Signs Last 24 Hrs  T(C): 36 (26 Feb 2020 16:00), Max: 36.4 (26 Feb 2020 07:00)  T(F): 96.8 (26 Feb 2020 16:00), Max: 97.5 (26 Feb 2020 07:00)  HR: 67 (26 Feb 2020 19:30) (50 - 69)  BP: 109/58 (26 Feb 2020 19:30) (106/58 - 196/88)  BP(mean): 88 (26 Feb 2020 12:00) (81 - 119)  RR: 16 (26 Feb 2020 19:30) (14 - 17)  SpO2: 98% (26 Feb 2020 19:30) (97% - 100%)      LABS:                        10.7   9.54  )-----------( 263      ( 25 Feb 2020 05:46 )             35.6     02-26    135  |  107  |  7   ----------------------------<  165<H>  3.9   |  22  |  0.61    Ca    8.4      26 Feb 2020 03:16  Phos  2.5     02-26  Mg     1.7     02-26                  RADIOLOGY & ADDITIONAL TESTS:  Results Reviewed:   Imaging Personally Reviewed:  Electrocardiogram Personally Reviewed:    COORDINATION OF CARE:  Care Discussed with Consultants/Other Providers [Y/N]:  Prior or Outpatient Records Reviewed [Y/N]: HPI/HOSPITAL COURSE:  54F with PMHx of right CVA (with resolved left sided deficits), HTN, T2DM, Obesity (post-sleeve gastrectomy), sarcoidosis, and glaucoma presents for elective bypass of right MCA. Patient with history of ischemic stroke in 2017 of the right basal ganglia and watershed stroke of right JARROD-MCA territory. Given these findings there was concern for embolic phenomenon. Patient eventually discharged with resolved deficits. Patient had loop recorder/cardiac monitor which did not  an arrythmia. Patient followed with Dr. Mathew and was told that she had a worsening occlusion of the right MCA and sequelae of pseudo-moyamoya pattern. Patient presented for elective bypass with transposition of superficial temporal artery. Blood loss was estimated to be 100 cc and patient given approximately 3 L of isotonic solution. Repeat CTA showing blood flow, but post-op complicated by pneumocephalus. Patient observed in NSICU for two days with no documented events. Medicine consulted for co-management.     SUBJECTIVE:  Patient seen and examined at bedside. She denies headache or visual changes. She states that her right shoulder hurts when moving it across her body possibly due to positioning during surgery or while in NSICU. Patient denies fever, chest pain, or shortness of breath. She has minimal pain at surgical site. The dressing appears c/d/i. Patient seen ambulating to restroom with no abnormality in gait.     ROS:  Gen: no night sweats or change in appetite  Eyes: no changes in vision or diplopia   ENT: no epistaxis, sinus pain, gingival bleeding, odynophagia or dysphagia  CV: no CP, PND or palpitations  Resp: no cough, wheezing, or hemoptysis  GI: no hematemesis, hematochezia, or melena  : no dysuria, polyuria, or hematuria  MSK: no arthralgias or joint swelling   Neuro: no gross sensory changes, numbness, focal deficits  Psych: no depression or changes in concentration  Heme/Onc: no purpura, petechiae or night sweats  Skin: no pruritus, hair loss or skin lesions  All: no photosensitivity, no complaints of anaphylaxis (SOB, throat swelling)      PMHx: as above  PSHx: sleeve gastrectomy  ALLERGIES: PCN  FHx: Diabetes, Sarcoidosis    MEDICATIONS:  MEDICATIONS  (STANDING):  aspirin  chewable 81 milliGRAM(s) Oral every 24 hours  atorvastatin 80 milliGRAM(s) Oral at bedtime  cloNIDine 0.2 milliGRAM(s) Oral two times a day  dextrose 5%. 1000 milliLiter(s) (50 mL/Hr) IV Continuous <Continuous>  dextrose 50% Injectable 12.5 Gram(s) IV Push once  dextrose 50% Injectable 25 Gram(s) IV Push once  dextrose 50% Injectable 25 Gram(s) IV Push once  enoxaparin Injectable 40 milliGRAM(s) SubCutaneous <User Schedule>  insulin lispro (HumaLOG) corrective regimen sliding scale   SubCutaneous three times a day before meals  latanoprost 0.005% Ophthalmic Solution 1 Drop(s) Both EYES at bedtime  losartan 100 milliGRAM(s) Oral daily  metoprolol tartrate 50 milliGRAM(s) Oral two times a day  multivitamin 1 Tablet(s) Oral daily  NIFEdipine XL 60 milliGRAM(s) Oral daily  sodium chloride 0.9%. 1000 milliLiter(s) (100 mL/Hr) IV Continuous <Continuous>  timolol 0.5% Solution 1 Drop(s) Both EYES two times a day    MEDICATIONS  (PRN):  acetaminophen   Tablet .. 650 milliGRAM(s) Oral every 6 hours PRN Temp greater or equal to 38C (100.4F), Mild Pain (1 - 3)  dextrose 40% Gel 15 Gram(s) Oral once PRN Blood Glucose LESS THAN 70 milliGRAM(s)/deciliter  glucagon  Injectable 1 milliGRAM(s) IntraMuscular once PRN Glucose LESS THAN 70 milligrams/deciliter  oxyCODONE    IR 5 milliGRAM(s) Oral every 4 hours PRN Moderate Pain (4 - 6)  oxyCODONE    IR 10 milliGRAM(s) Oral every 4 hours PRN Severe Pain (7 - 10)    PHYSICAL EXAM:  T(C): 36 (02-26-20 @ 16:00), Max: 36.4 (02-26-20 @ 07:00)  HR: 67 (02-26-20 @ 19:30) (50 - 69)  BP: 109/58 (02-26-20 @ 19:30) (106/58 - 196/88)  RR: 16 (02-26-20 @ 19:30) (14 - 17)  SpO2: 98% (02-26-20 @ 19:30) (97% - 100%)    Gen: female in NAD, appears comfortable, no diaphoresis  HEENT: NCAT, MMM, neck soft and supple  CV: +S1/S2, no m/r/g  Resp: CTAB, no w/r/r  GI: normoactive BS, soft, NTND, no rebounding/guarding  Ext: No LE edema, extremities appear warm and well perfused   Neuro: AOx3, no focal deficits, CNII-XII grossly intact  Psych: No SI/HI/AVH, appropriate affect  Skin: no petechiae, ecchymosis or maculopapular rash noted    LABS/IMAGING:  CAPILLARY BLOOD GLUCOSE    POCT Blood Glucose.: 142 mg/dL (26 Feb 2020 17:03)  POCT Blood Glucose.: 141 mg/dL (26 Feb 2020 11:16)  POCT Blood Glucose.: 137 mg/dL (26 Feb 2020 07:34)    LABS:                        10.7   9.54  )-----------( 263      ( 25 Feb 2020 05:46 )             35.6     02-26    135  |  107  |  7   ----------------------------<  165<H>  3.9   |  22  |  0.61    Ca    8.4      26 Feb 2020 03:16  Phos  2.5     02-26  Mg     1.7     02-26    COORDINATION OF CARE:  Care Discussed with Consultants/Other Providers [Y/N]: Y  Prior or Outpatient Records Reviewed [Y/N]: Y HPI/HOSPITAL COURSE:  54F with PMHx of right CVA (with resolved left sided deficits), HTN, T2DM, Obesity (post-sleeve gastrectomy), sarcoidosis, and glaucoma presents for elective bypass of right MCA. Patient with history of ischemic stroke in 2017 of the right basal ganglia and watershed stroke of right JARROD-MCA territory. Given these findings there was concern for embolic phenomenon. Patient eventually discharged with resolved deficits. Patient had loop recorder/cardiac monitor which did not  an arrythmia. Patient followed with Dr. Mathew and was told that she had a worsening occlusion of the right MCA and sequelae of pseudo-moyamoya pattern. Patient presented for elective bypass with transposition of superficial temporal artery. Blood loss was estimated to be 100 cc and patient given approximately 3 L of isotonic solution. Repeat CTA showing blood flow, but post-op complicated by pneumocephalus. Patient observed in NSICU for two days with no documented events. Medicine consulted for co-management.     SUBJECTIVE:  Patient seen and examined at bedside. She denies headache or visual changes. She states that her right shoulder hurts when moving it across her body possibly due to positioning during surgery or while in NSICU. Patient denies fever, chest pain, or shortness of breath. She has minimal pain at surgical site. The dressing appears c/d/i. Patient seen ambulating to restroom with no abnormality in gait.     ROS:  Gen: no night sweats or change in appetite  Eyes: no changes in vision or diplopia   ENT: no epistaxis, sinus pain, gingival bleeding, odynophagia or dysphagia  CV: no CP, PND or palpitations  Resp: no cough, wheezing, or hemoptysis  GI: no hematemesis, hematochezia, or melena  : no dysuria, polyuria, or hematuria  MSK: no arthralgias or joint swelling   Neuro: no gross sensory changes, numbness, focal deficits  Psych: no depression or changes in concentration  Heme/Onc: no purpura, petechiae or night sweats  Skin: no pruritus, hair loss or skin lesions  All: no photosensitivity, no complaints of anaphylaxis (SOB, throat swelling)      PMHx: as above  PSHx: sleeve gastrectomy  ALLERGIES: PCN  FHx: Diabetes, Sarcoidosis  SHx: no tobacco or IVDU    MEDICATIONS:  MEDICATIONS  (STANDING):  aspirin  chewable 81 milliGRAM(s) Oral every 24 hours  atorvastatin 80 milliGRAM(s) Oral at bedtime  cloNIDine 0.2 milliGRAM(s) Oral two times a day  dextrose 5%. 1000 milliLiter(s) (50 mL/Hr) IV Continuous <Continuous>  dextrose 50% Injectable 12.5 Gram(s) IV Push once  dextrose 50% Injectable 25 Gram(s) IV Push once  dextrose 50% Injectable 25 Gram(s) IV Push once  enoxaparin Injectable 40 milliGRAM(s) SubCutaneous <User Schedule>  insulin lispro (HumaLOG) corrective regimen sliding scale   SubCutaneous three times a day before meals  latanoprost 0.005% Ophthalmic Solution 1 Drop(s) Both EYES at bedtime  losartan 100 milliGRAM(s) Oral daily  metoprolol tartrate 50 milliGRAM(s) Oral two times a day  multivitamin 1 Tablet(s) Oral daily  NIFEdipine XL 60 milliGRAM(s) Oral daily  sodium chloride 0.9%. 1000 milliLiter(s) (100 mL/Hr) IV Continuous <Continuous>  timolol 0.5% Solution 1 Drop(s) Both EYES two times a day    MEDICATIONS  (PRN):  acetaminophen   Tablet .. 650 milliGRAM(s) Oral every 6 hours PRN Temp greater or equal to 38C (100.4F), Mild Pain (1 - 3)  dextrose 40% Gel 15 Gram(s) Oral once PRN Blood Glucose LESS THAN 70 milliGRAM(s)/deciliter  glucagon  Injectable 1 milliGRAM(s) IntraMuscular once PRN Glucose LESS THAN 70 milligrams/deciliter  oxyCODONE    IR 5 milliGRAM(s) Oral every 4 hours PRN Moderate Pain (4 - 6)  oxyCODONE    IR 10 milliGRAM(s) Oral every 4 hours PRN Severe Pain (7 - 10)    PHYSICAL EXAM:  T(C): 36 (02-26-20 @ 16:00), Max: 36.4 (02-26-20 @ 07:00)  HR: 67 (02-26-20 @ 19:30) (50 - 69)  BP: 109/58 (02-26-20 @ 19:30) (106/58 - 196/88)  RR: 16 (02-26-20 @ 19:30) (14 - 17)  SpO2: 98% (02-26-20 @ 19:30) (97% - 100%)    Gen: female in NAD, appears comfortable, no diaphoresis  HEENT: NCAT, MMM, neck soft and supple  CV: +S1/S2, no m/r/g  Resp: CTAB, no w/r/r  GI: normoactive BS, soft, NTND, no rebounding/guarding  Ext: No LE edema, extremities appear warm and well perfused   Neuro: AOx3, no focal deficits, CNII-XII grossly intact  Psych: No SI/HI/AVH, appropriate affect  Skin: no petechiae, ecchymosis or maculopapular rash noted    LABS/IMAGING:  CAPILLARY BLOOD GLUCOSE    POCT Blood Glucose.: 142 mg/dL (26 Feb 2020 17:03)  POCT Blood Glucose.: 141 mg/dL (26 Feb 2020 11:16)  POCT Blood Glucose.: 137 mg/dL (26 Feb 2020 07:34)    LABS:                        10.7   9.54  )-----------( 263      ( 25 Feb 2020 05:46 )             35.6     02-26    135  |  107  |  7   ----------------------------<  165<H>  3.9   |  22  |  0.61    Ca    8.4      26 Feb 2020 03:16  Phos  2.5     02-26  Mg     1.7     02-26    COORDINATION OF CARE:  Care Discussed with Consultants/Other Providers [Y/N]: Y  Prior or Outpatient Records Reviewed [Y/N]: Y

## 2020-02-27 ENCOUNTER — TRANSCRIPTION ENCOUNTER (OUTPATIENT)
Age: 55
End: 2020-02-27

## 2020-02-27 VITALS
DIASTOLIC BLOOD PRESSURE: 94 MMHG | SYSTOLIC BLOOD PRESSURE: 152 MMHG | RESPIRATION RATE: 18 BRPM | TEMPERATURE: 97 F | OXYGEN SATURATION: 99 % | HEART RATE: 62 BPM

## 2020-02-27 LAB
ANION GAP SERPL CALC-SCNC: 11 MMOL/L — SIGNIFICANT CHANGE UP (ref 5–17)
BUN SERPL-MCNC: 7 MG/DL — SIGNIFICANT CHANGE UP (ref 7–23)
CALCIUM SERPL-MCNC: 9 MG/DL — SIGNIFICANT CHANGE UP (ref 8.4–10.5)
CHLORIDE SERPL-SCNC: 101 MMOL/L — SIGNIFICANT CHANGE UP (ref 96–108)
CO2 SERPL-SCNC: 24 MMOL/L — SIGNIFICANT CHANGE UP (ref 22–31)
CREAT SERPL-MCNC: 0.48 MG/DL — LOW (ref 0.5–1.3)
GLUCOSE BLDC GLUCOMTR-MCNC: 153 MG/DL — HIGH (ref 70–99)
GLUCOSE BLDC GLUCOMTR-MCNC: 158 MG/DL — HIGH (ref 70–99)
GLUCOSE BLDC GLUCOMTR-MCNC: 164 MG/DL — HIGH (ref 70–99)
GLUCOSE SERPL-MCNC: 141 MG/DL — HIGH (ref 70–99)
HCT VFR BLD CALC: 39.9 % — SIGNIFICANT CHANGE UP (ref 34.5–45)
HGB BLD-MCNC: 11.9 G/DL — SIGNIFICANT CHANGE UP (ref 11.5–15.5)
MCHC RBC-ENTMCNC: 23.5 PG — LOW (ref 27–34)
MCHC RBC-ENTMCNC: 29.8 GM/DL — LOW (ref 32–36)
MCV RBC AUTO: 78.7 FL — LOW (ref 80–100)
NRBC # BLD: 0 /100 WBCS — SIGNIFICANT CHANGE UP (ref 0–0)
PLATELET # BLD AUTO: 282 K/UL — SIGNIFICANT CHANGE UP (ref 150–400)
POTASSIUM SERPL-MCNC: 3.5 MMOL/L — SIGNIFICANT CHANGE UP (ref 3.5–5.3)
POTASSIUM SERPL-SCNC: 3.5 MMOL/L — SIGNIFICANT CHANGE UP (ref 3.5–5.3)
RBC # BLD: 5.07 M/UL — SIGNIFICANT CHANGE UP (ref 3.8–5.2)
RBC # FLD: 15.7 % — HIGH (ref 10.3–14.5)
SODIUM SERPL-SCNC: 136 MMOL/L — SIGNIFICANT CHANGE UP (ref 135–145)
WBC # BLD: 7.13 K/UL — SIGNIFICANT CHANGE UP (ref 3.8–10.5)
WBC # FLD AUTO: 7.13 K/UL — SIGNIFICANT CHANGE UP (ref 3.8–10.5)

## 2020-02-27 PROCEDURE — 97162 PT EVAL MOD COMPLEX 30 MIN: CPT

## 2020-02-27 PROCEDURE — 84100 ASSAY OF PHOSPHORUS: CPT

## 2020-02-27 PROCEDURE — C1713: CPT

## 2020-02-27 PROCEDURE — 85027 COMPLETE CBC AUTOMATED: CPT

## 2020-02-27 PROCEDURE — 86923 COMPATIBILITY TEST ELECTRIC: CPT

## 2020-02-27 PROCEDURE — 85014 HEMATOCRIT: CPT

## 2020-02-27 PROCEDURE — 82435 ASSAY OF BLOOD CHLORIDE: CPT

## 2020-02-27 PROCEDURE — 82962 GLUCOSE BLOOD TEST: CPT

## 2020-02-27 PROCEDURE — 82330 ASSAY OF CALCIUM: CPT

## 2020-02-27 PROCEDURE — 82947 ASSAY GLUCOSE BLOOD QUANT: CPT

## 2020-02-27 PROCEDURE — 97530 THERAPEUTIC ACTIVITIES: CPT

## 2020-02-27 PROCEDURE — C1889: CPT

## 2020-02-27 PROCEDURE — 82565 ASSAY OF CREATININE: CPT

## 2020-02-27 PROCEDURE — 97116 GAIT TRAINING THERAPY: CPT

## 2020-02-27 PROCEDURE — 83735 ASSAY OF MAGNESIUM: CPT

## 2020-02-27 PROCEDURE — 93970 EXTREMITY STUDY: CPT

## 2020-02-27 PROCEDURE — 84132 ASSAY OF SERUM POTASSIUM: CPT

## 2020-02-27 PROCEDURE — 82803 BLOOD GASES ANY COMBINATION: CPT

## 2020-02-27 PROCEDURE — C1769: CPT

## 2020-02-27 PROCEDURE — 80048 BASIC METABOLIC PNL TOTAL CA: CPT

## 2020-02-27 PROCEDURE — 84295 ASSAY OF SERUM SODIUM: CPT

## 2020-02-27 PROCEDURE — 85610 PROTHROMBIN TIME: CPT

## 2020-02-27 PROCEDURE — 70496 CT ANGIOGRAPHY HEAD: CPT

## 2020-02-27 PROCEDURE — 81025 URINE PREGNANCY TEST: CPT

## 2020-02-27 PROCEDURE — 83605 ASSAY OF LACTIC ACID: CPT

## 2020-02-27 RX ORDER — ACETAMINOPHEN 500 MG
2 TABLET ORAL
Qty: 0 | Refills: 0 | DISCHARGE
Start: 2020-02-27

## 2020-02-27 RX ORDER — OXYCODONE HYDROCHLORIDE 5 MG/1
1 TABLET ORAL
Qty: 12 | Refills: 0
Start: 2020-02-27 | End: 2020-02-29

## 2020-02-27 RX ORDER — SENNA PLUS 8.6 MG/1
1 TABLET ORAL
Qty: 0 | Refills: 0 | DISCHARGE
Start: 2020-02-27

## 2020-02-27 RX ORDER — ACETAMINOPHEN 500 MG
2 TABLET ORAL
Qty: 40 | Refills: 0
Start: 2020-02-27 | End: 2020-03-02

## 2020-02-27 RX ORDER — SENNA PLUS 8.6 MG/1
1 TABLET ORAL DAILY
Refills: 0 | Status: DISCONTINUED | OUTPATIENT
Start: 2020-02-27 | End: 2020-02-27

## 2020-02-27 RX ORDER — POTASSIUM CHLORIDE 20 MEQ
40 PACKET (EA) ORAL ONCE
Refills: 0 | Status: COMPLETED | OUTPATIENT
Start: 2020-02-27 | End: 2020-02-27

## 2020-02-27 RX ADMIN — LOSARTAN POTASSIUM 100 MILLIGRAM(S): 100 TABLET, FILM COATED ORAL at 05:03

## 2020-02-27 RX ADMIN — Medication 0.2 MILLIGRAM(S): at 17:53

## 2020-02-27 RX ADMIN — Medication 1 DROP(S): at 05:06

## 2020-02-27 RX ADMIN — Medication 1 TABLET(S): at 12:38

## 2020-02-27 RX ADMIN — Medication 650 MILLIGRAM(S): at 12:38

## 2020-02-27 RX ADMIN — Medication 60 MILLIGRAM(S): at 05:06

## 2020-02-27 RX ADMIN — Medication 1: at 12:43

## 2020-02-27 RX ADMIN — Medication 1 DROP(S): at 17:53

## 2020-02-27 RX ADMIN — OXYCODONE HYDROCHLORIDE 5 MILLIGRAM(S): 5 TABLET ORAL at 01:38

## 2020-02-27 RX ADMIN — Medication 650 MILLIGRAM(S): at 05:04

## 2020-02-27 RX ADMIN — Medication 650 MILLIGRAM(S): at 05:34

## 2020-02-27 RX ADMIN — Medication 40 MILLIEQUIVALENT(S): at 09:46

## 2020-02-27 RX ADMIN — Medication 50 MILLIGRAM(S): at 17:53

## 2020-02-27 RX ADMIN — Medication 0.2 MILLIGRAM(S): at 05:03

## 2020-02-27 RX ADMIN — Medication 1: at 09:30

## 2020-02-27 RX ADMIN — OXYCODONE HYDROCHLORIDE 5 MILLIGRAM(S): 5 TABLET ORAL at 01:08

## 2020-02-27 RX ADMIN — Medication 50 MILLIGRAM(S): at 05:03

## 2020-02-27 NOTE — DISCHARGE NOTE NURSING/CASE MANAGEMENT/SOCIAL WORK - NSDCPEPTSTRK_GEN_ALL_CORE
Risk factors for stroke/Need for follow up after discharge/Prescribed medications/Stroke education booklet/Stroke support groups for patients, families, and friends/Signs and symptoms of stroke/Stroke warning signs and symptoms/Call 911 for stroke

## 2020-02-27 NOTE — DISCHARGE NOTE PROVIDER - NSDCMRMEDTOKEN_GEN_ALL_CORE_FT
acetaminophen 325 mg oral tablet: 2 tab(s) orally every 6 hours, As needed, Temp greater or equal to 38C (100.4F), Mild Pain (1 - 3)  aspirin 81 mg oral tablet: 1 tab(s) orally once a day  atorvastatin 80 mg oral tablet: 1 tab(s) orally once a day (at bedtime)   cloNIDine 0.2 mg oral tablet: 1 tab(s) orally 2 times a day  Glucometer:   Jardiance 25 mg oral tablet: 1 tab(s) orally once a day (in the morning)  Lancet:   latanoprost 0.005% ophthalmic solution: 1 drop(s) to each affected eye once a day (in the evening)  losartan 100 mg oral tablet: 1 tab(s) orally once a day   metFORMIN 1000 mg oral tablet: 1 tab(s) orally 2 times a day  metoprolol tartrate 50 mg oral tablet: 1 tab(s) orally 2 times a day   Multiple Vitamins oral tablet: 1 tab(s) orally once a day  NIFEdipine 60 mg oral tablet, extended release: 1 tab(s) orally once a day   oxyCODONE 5 mg oral tablet: 1 tab(s) orally every 6 hours, As Needed -Moderate Pain (4 - 6) MDD:4 tabs daily  senna oral tablet: 1 tab(s) orally once a day, As needed, Constipation  timolol maleate 0.5% ophthalmic solution: 1 drop(s) to each affected eye 2 times a day  Vitamin B12 500 mcg oral tablet: 1 tab(s) orally once a day  Vitamin D2 50,000 intl units (1.25 mg) oral capsule: 1 cap(s) orally once a week

## 2020-02-27 NOTE — DISCHARGE NOTE PROVIDER - NSDCFUADDINST_GEN_ALL_CORE_FT
Please make all necessary appointment and follow up. Please continue to take Aspirin 81 mg daily. Please do NOT take any NSAIDs (Ibuprofen, Advil, Aleve and Motrin) until cleared by Dr. Faria.   Please go to the emergency room for any of the following: altered mental status, seizures, pain uncontrolled by pain medications, leaking / bleeding from surgical incision, chest pain and shortness of breath.

## 2020-02-27 NOTE — PROGRESS NOTE ADULT - ASSESSMENT
ASSESSMENT AND PLAN: 54 year old female, HTN, HLD, DM2, CVA 2017 with no residual deficits s/p loop recorder after CVA with reportedly no Afib found, found to have RMCA stenosis on imaging and angiogram, s/p RT crani for RT STA-MCA bypass POD 3    NEURO:   - Continue Neuro checks q 4  - Continue Aspirin 81 daily - for bypass  - Continue pain control w/ tylenol prn and oxycodone prn  - Continue Timolol and Xalatan eye drops    PULM:   - Encourage incentive spirometry    CV:  - -160  - Continue HTN meds: Clonidine, Losartan, Lopressor, Nifedipine  - Continue Lipitor for HLD    ENDO:   - HgbA1c 8.4  - Continue HISS and monitor FS, stable     HEME/ONC:       CBC 2/27 stable               DVT ppx: on SQL, 2/25 [place] negative for DVT    RENAL:   - Continue NS@100 per Dr. Faria  - BMP reviewed this morning, K 3.5 (low end of normal), supplemented KCl 40x1    ID:   - Afebrile    GI:    - Continue MVT   - Added senna for prn constipation    DISCHARGE PLANNING:   PT - Home w/ no skilled PT needs    Assessment:  Please Check When Present   []  GCS  E   V  M     Heart Failure: []Acute, [] acute on chronic , []chronic  Heart Failure:  [] Diastolic (HFpEF), [] Systolic (HFrEF), []Combined (HFpEF and HFrEF), [] RHF, [] Pulm HTN, [] Other    [] BRENNEN, [] ATN, [] AIN, [] other  [] CKD1, [] CKD2, [] CKD 3, [] CKD 4, [] CKD 5, []ESRD    Encephalopathy: [] Metabolic, [] Hepatic, [] toxic, [] Neurological, [] Other    Abnormal Nurtitional Status: [] malnutrition (see nutrition note), [ ]underweight: BMI < 19, [] morbid obesity: BMI >40, [] Cachexia    [] Sepsis  [] hypovolemic shock,[] cardiogenic shock, [] hemorrhagic shock, [] neuogenic shock  [] Acute Respiratory Failure  []Cerebral edema, [] Brain compression/ herniation,   [] Functional quadriplegia  [] Acute blood loss anemia    To be discussed w/ Dr. Faria  [id number]

## 2020-02-27 NOTE — DISCHARGE NOTE PROVIDER - NSDCCPCAREPLAN_GEN_ALL_CORE_FT
PRINCIPAL DISCHARGE DIAGNOSIS  Diagnosis: Middle cerebral artery stenosis, right  Assessment and Plan of Treatment: s/p right craniotomy for right STA-MCA bypass 2/24  Please make an appointment and follow up with Dr. Faria in one week after discharge. Please continue to take Aspirin 81 mg daily. Patient may shower but please no soap / no shampoo over surgical incision. Please keep incision clean and dry. No scrubbing, pat dry only.      SECONDARY DISCHARGE DIAGNOSES  Diagnosis: Diabetes mellitus  Assessment and Plan of Treatment: Please make an appointment and follow up with your primary care provider Dr. Ritchie in one week after discharge [phone]. Please continue your diabetic medications.    Diagnosis: HTN (hypertension)  Assessment and Plan of Treatment: HTN (hypertension)  Please make an appointment and follow up with your primary care provider and follow up in one week after discharge. Please continue your blood pressure medications.    Diagnosis: HLD (hyperlipidemia)  Assessment and Plan of Treatment: HLD (hyperlipidemia)  Please make an appointment and follow up with your primary care provider and follow up in one week after discharge. Please continue Lipitor.

## 2020-02-27 NOTE — PROGRESS NOTE ADULT - SUBJECTIVE AND OBJECTIVE BOX
SUBJECTIVE: HPI:  53 y/o F PMH CVA 2017 with no residual neurological deficits, S/P loop recorder placement after CVA, as per patient has not detected A fib, found to have stenosis of right MCA on imaging, S/P cerebral angiogram 1/09/20.  Presents today for right superficial temporal artery to middle cerebral artery bypass. (10 Feb 2020 07:32)      OVERNIGHT EVENTS: Patient transferred from PACU / NSCU to [facility] yesterday, otherwise no acute events. Patient seen and evaluated at bedside doing well, states that her right periorbital swelling is feeling much better, c/o mild incisional pain however controlled with pain medications.    Vital Signs Last 24 Hrs  T(C): 36.7 (27 Feb 2020 08:22), Max: 36.8 (26 Feb 2020 20:40)  T(F): 98 (27 Feb 2020 08:22), Max: 98.2 (26 Feb 2020 20:40)  HR: 59 (27 Feb 2020 08:22) (57 - 67)  BP: 123/63 (27 Feb 2020 08:22) (106/58 - 176/89)  BP(mean): 88 (26 Feb 2020 12:00) (88 - 88)  RR: 18 (27 Feb 2020 08:22) (14 - 18)  SpO2: 96% (27 Feb 2020 08:22) (96% - 99%)    PHYSICAL EXAM:    General: No Acute Distress     Neurological: Awake, alert, OX3, RT periorbital swelling - mild / able to open her eye, PERRL, EOMI, pupils 2mm react, FC, no drifts, no dysmetria, TREJO 5/5, SILT    Pulmonary: Clear to Auscultation, No Rales, No Rhonchi, No Wheezes     Cardiovascular: S1, S2, Regular Rate and Rhythm     Gastrointestinal: Soft, Nontender, Nondistended     Incision: Crani sutures in place C/D/I    LABS:                        11.9   7.13  )-----------( 282      ( 27 Feb 2020 06:25 )             39.9    02-27    136  |  101  |  7   ----------------------------<  141<H>  3.5   |  24  |  0.48<L>    Ca    9.0      27 Feb 2020 06:25  Phos  2.5     02-26  Mg     1.7     02-26 02-26 @ 07:01  -  02-27 @ 07:00  --------------------------------------------------------  IN: 2720 mL / OUT: 1100 mL / NET: 1620 mL      DRAINS:     MEDICATIONS:  Antibiotics:    Neuro:  acetaminophen   Tablet .. 650 milliGRAM(s) Oral every 6 hours PRN Temp greater or equal to 38C (100.4F), Mild Pain (1 - 3)  oxyCODONE    IR 5 milliGRAM(s) Oral every 4 hours PRN Moderate Pain (4 - 6)  oxyCODONE    IR 10 milliGRAM(s) Oral every 4 hours PRN Severe Pain (7 - 10)    Cardiac:  cloNIDine 0.2 milliGRAM(s) Oral two times a day  losartan 100 milliGRAM(s) Oral daily  metoprolol tartrate 50 milliGRAM(s) Oral two times a day  NIFEdipine XL 60 milliGRAM(s) Oral daily    Pulm:    GI/:    Other:   aspirin  chewable 81 milliGRAM(s) Oral every 24 hours  atorvastatin 80 milliGRAM(s) Oral at bedtime  dextrose 40% Gel 15 Gram(s) Oral once PRN Blood Glucose LESS THAN 70 milliGRAM(s)/deciliter  dextrose 5%. 1000 milliLiter(s) IV Continuous <Continuous>  dextrose 50% Injectable 12.5 Gram(s) IV Push once  dextrose 50% Injectable 25 Gram(s) IV Push once  dextrose 50% Injectable 25 Gram(s) IV Push once  enoxaparin Injectable 40 milliGRAM(s) SubCutaneous <User Schedule>  glucagon  Injectable 1 milliGRAM(s) IntraMuscular once PRN Glucose LESS THAN 70 milligrams/deciliter  insulin lispro (HumaLOG) corrective regimen sliding scale   SubCutaneous three times a day before meals  latanoprost 0.005% Ophthalmic Solution 1 Drop(s) Both EYES at bedtime  multivitamin 1 Tablet(s) Oral daily  potassium chloride    Tablet ER 40 milliEquivalent(s) Oral once  sodium chloride 0.9%. 1000 milliLiter(s) IV Continuous <Continuous>  timolol 0.5% Solution 1 Drop(s) Both EYES two times a day    DIET: [] Regular [x] CCD [] Renal [] Puree [] Dysphagia [] Tube Feeds:     IMAGING:   < from: CT Angio Head w/ IV Cont (02.25.20 @ 09:43) >  IMPRESSION:    CT BRAIN:    Interval right pterionalcraniotomy. Subjacent mixed density extra-axial collection measures 6 mm in greatest depth. Right frontal pneumocephalus is noted.    No midline shift, effacement of the basal cisterns, or hydrocephalus    No acute parenchymal hemorrhage or CT evidence of acute territorial infarct.    CTA BRAIN:    Evidence for interval direct right superficial temporal artery to right MCA bypass.    Similar Similar appearing lack of flow related enhancement of the mid and distal right M1 segment and right M1 bifurcation. Flow-related enhancement of more distal right MCA branches is present.    MARGARITO STRONG M.D., ATTENDING RADIOLOGIST  This document has been electronically signed. Feb 25 2020 12:20PM    < end of copied text > SUBJECTIVE: HPI:  55 y/o F PMH CVA 2017 with no residual neurological deficits, S/P loop recorder placement after CVA, as per patient has not detected A fib, found to have stenosis of right MCA on imaging, S/P cerebral angiogram 1/09/20.  Presents today for right superficial temporal artery to middle cerebral artery bypass. (10 Feb 2020 07:32)      OVERNIGHT EVENTS: Patient transferred from PACU / NSCU to [facility] yesterday, otherwise no acute events. Patient seen and evaluated at bedside doing well, states that her right periorbital swelling is feeling much better, c/o mild incisional pain however controlled with pain medications.    Saw patient again this afternoon after PT which she did well - no skilled needs, complaining of right arm muscle pain and stiffness that began after post-op, spoke with Neurosurgery and they said likely from positioning during the OR. She also admitted in not moving her rt arm a lot due to being "afraid the IV pump would go off." Examined the patient - right arm muscle pain, no numbness and tingling, no point tenderness, not swollen, IV in place is soft and non-tender and on exam, when isolated each muscle arm is 5/5. ROM improved after hot pack.    Vital Signs Last 24 Hrs  T(C): 36.7 (27 Feb 2020 08:22), Max: 36.8 (26 Feb 2020 20:40)  T(F): 98 (27 Feb 2020 08:22), Max: 98.2 (26 Feb 2020 20:40)  HR: 59 (27 Feb 2020 08:22) (57 - 67)  BP: 123/63 (27 Feb 2020 08:22) (106/58 - 176/89)  BP(mean): 88 (26 Feb 2020 12:00) (88 - 88)  RR: 18 (27 Feb 2020 08:22) (14 - 18)  SpO2: 96% (27 Feb 2020 08:22) (96% - 99%)    PHYSICAL EXAM:    General: No Acute Distress     Neurological: Awake, alert, OX3, RT periorbital swelling - mild / able to open her eye, PERRL, EOMI, pupils 2mm react, FC, no drifts, no dysmetria, TREJO 5/5, SILT    Pulmonary: Clear to Auscultation, No Rales, No Rhonchi, No Wheezes     Cardiovascular: S1, S2, Regular Rate and Rhythm     Gastrointestinal: Soft, Nontender, Nondistended     Incision: Crani sutures in place C/D/I    LABS:                        11.9   7.13  )-----------( 282      ( 27 Feb 2020 06:25 )             39.9    02-27    136  |  101  |  7   ----------------------------<  141<H>  3.5   |  24  |  0.48<L>    Ca    9.0      27 Feb 2020 06:25  Phos  2.5     02-26  Mg     1.7     02-26 02-26 @ 07:01  -  02-27 @ 07:00  --------------------------------------------------------  IN: 2720 mL / OUT: 1100 mL / NET: 1620 mL      DRAINS:     MEDICATIONS:  Antibiotics:    Neuro:  acetaminophen   Tablet .. 650 milliGRAM(s) Oral every 6 hours PRN Temp greater or equal to 38C (100.4F), Mild Pain (1 - 3)  oxyCODONE    IR 5 milliGRAM(s) Oral every 4 hours PRN Moderate Pain (4 - 6)  oxyCODONE    IR 10 milliGRAM(s) Oral every 4 hours PRN Severe Pain (7 - 10)    Cardiac:  cloNIDine 0.2 milliGRAM(s) Oral two times a day  losartan 100 milliGRAM(s) Oral daily  metoprolol tartrate 50 milliGRAM(s) Oral two times a day  NIFEdipine XL 60 milliGRAM(s) Oral daily    Pulm:    GI/:    Other:   aspirin  chewable 81 milliGRAM(s) Oral every 24 hours  atorvastatin 80 milliGRAM(s) Oral at bedtime  dextrose 40% Gel 15 Gram(s) Oral once PRN Blood Glucose LESS THAN 70 milliGRAM(s)/deciliter  dextrose 5%. 1000 milliLiter(s) IV Continuous <Continuous>  dextrose 50% Injectable 12.5 Gram(s) IV Push once  dextrose 50% Injectable 25 Gram(s) IV Push once  dextrose 50% Injectable 25 Gram(s) IV Push once  enoxaparin Injectable 40 milliGRAM(s) SubCutaneous <User Schedule>  glucagon  Injectable 1 milliGRAM(s) IntraMuscular once PRN Glucose LESS THAN 70 milligrams/deciliter  insulin lispro (HumaLOG) corrective regimen sliding scale   SubCutaneous three times a day before meals  latanoprost 0.005% Ophthalmic Solution 1 Drop(s) Both EYES at bedtime  multivitamin 1 Tablet(s) Oral daily  potassium chloride    Tablet ER 40 milliEquivalent(s) Oral once  sodium chloride 0.9%. 1000 milliLiter(s) IV Continuous <Continuous>  timolol 0.5% Solution 1 Drop(s) Both EYES two times a day    DIET: [] Regular [x] CCD [] Renal [] Puree [] Dysphagia [] Tube Feeds:     IMAGING:   < from: CT Angio Head w/ IV Cont (02.25.20 @ 09:43) >  IMPRESSION:    CT BRAIN:    Interval right pterionalcraniotomy. Subjacent mixed density extra-axial collection measures 6 mm in greatest depth. Right frontal pneumocephalus is noted.    No midline shift, effacement of the basal cisterns, or hydrocephalus    No acute parenchymal hemorrhage or CT evidence of acute territorial infarct.    CTA BRAIN:    Evidence for interval direct right superficial temporal artery to right MCA bypass.    Similar Similar appearing lack of flow related enhancement of the mid and distal right M1 segment and right M1 bifurcation. Flow-related enhancement of more distal right MCA branches is present.    MARGARITO STRONG M.D., ATTENDING RADIOLOGIST  This document has been electronically signed. Feb 25 2020 12:20PM    < end of copied text >

## 2020-02-27 NOTE — DISCHARGE NOTE PROVIDER - CARE PROVIDER_API CALL
Oscar Faria)  Neurosurgery  [address], [address] [place] [address]  Phone: [phone]  Fax: [phone]  Follow Up Time:

## 2020-02-27 NOTE — DISCHARGE NOTE NURSING/CASE MANAGEMENT/SOCIAL WORK - PATIENT PORTAL LINK FT
You can access the FollowMyHealth Patient Portal offered by [facility] by registering at the following website: [email or web address]. By joining [facility]’s FollowMyHealth portal, you will also be able to view your health information using other applications (apps) compatible with our system.

## 2020-02-27 NOTE — DISCHARGE NOTE PROVIDER - NSDCFUSCHEDAPPT_GEN_ALL_CORE_FT
TIESHA KIM ; 03/16/2020 ; NP Cardio Electro [id number]  TIESHA KIM ; 03/20/2020 ; NP Gen Surg [address]   TIESHA KIM ; 04/06/2020 ; NP Ophthal [address] [facility]  TIESHA KIM ; 04/21/2020 ; NPP Ophthal [address] [facility]  TIESHA KIM ; 05/18/2020 ; [facility] Med Endocr [address] [facility]

## 2020-03-06 ENCOUNTER — APPOINTMENT (OUTPATIENT)
Dept: NEUROSURGERY | Facility: CLINIC | Age: 55
End: 2020-03-06
Payer: MEDICAID

## 2020-03-06 VITALS
WEIGHT: 206 LBS | TEMPERATURE: 97.9 F | OXYGEN SATURATION: 98 % | HEIGHT: 67 IN | DIASTOLIC BLOOD PRESSURE: 82 MMHG | SYSTOLIC BLOOD PRESSURE: 140 MMHG | HEART RATE: 50 BPM | BODY MASS INDEX: 32.33 KG/M2 | RESPIRATION RATE: 16 BRPM

## 2020-03-06 PROCEDURE — 99024 POSTOP FOLLOW-UP VISIT: CPT

## 2020-03-06 RX ORDER — LORAZEPAM 1 MG/1
1 TABLET ORAL
Qty: 3 | Refills: 0 | Status: DISCONTINUED | COMMUNITY
Start: 2019-08-28 | End: 2020-03-06

## 2020-03-06 NOTE — HISTORY OF PRESENT ILLNESS
[FreeTextEntry1] : Meri Kc is a pleasant right handed 54 year old AA lady who presented for consultation regarding M1 stenosis. She had neuro imaging done as recommended and is here to review results and discuss treatment plan. She states that she is feeling well and denies any stroke symptoms.\par \par On 2/24/20 she underwent a right superficial temporal artery- middle cerebral artery frontal branch direct microanastomosis.\par \par Today she presents for her first post op visit in good spirits.  her right temporal area is mildly swollen, dissolvable sutures are intact.  Edges are well approximated without any evidence of drainage, redness.  She denies fever.  No facial weakness noted.  She reports intermittent headaches 10/10 that originates in the right eye and radiate to right entire head.  Pain is controlled with Tylenol and Oxycodone prn.\par \par Her cardiologist is Dr. Kamari Rizvi, [phone] [place] [address], [place].\par \par Her PCP is Dr. Maryuri Ritchie, [address] [place] [address].\par \par

## 2020-03-06 NOTE — ASSESSMENT
[FreeTextEntry1] : IMPRESSION:\par 1. Pt is neurologically intact.\par 2. Right temporal incision is healing without any evidence of infection.  Mild swelling noted in are.\par \par \par PLAN:\par 1. F/U 4/6/20 with Dr. Faria and NP.\par 2. Okay to wash hair with baby shampoo and pat dry incision thoroughly.\par 3. Report any fevers and redness at surgical incision to us immediately.\par 4. F/U with PCP - per patient she has an appointment with PCP next week.\par 5. Okay to resume Plavix 75 mg on 3/16/20 - okay per Dr. Faria to resume at lower dosage 3 weeks post op.\par 6. Advised patient to make appointment for follow up with Dr. Alegria - stroke neurologist.\par 6. Educated patient on the importance of taking ASA 81 mg daily and staying well hydrated.

## 2020-03-06 NOTE — PHYSICAL EXAM
[General Appearance - Alert] : alert [General Appearance - In No Acute Distress] : in no acute distress [General Appearance - Well Nourished] : well nourished [General Appearance - Well Developed] : well developed [General Appearance - Well-Appearing] : healthy appearing [Clean] : clean [Dry] : dry [Healing Well] : healing well [Sutures Intact] : closed with intact sutures [No Drainage] : without drainage [Normal Skin] : normal [Oriented To Time, Place, And Person] : oriented to person, place, and time [Impaired Insight] : insight and judgment were intact [Affect] : the affect was normal [Memory Recent] : recent memory was not impaired [Person] : oriented to person [Place] : oriented to place [Time] : oriented to time [Cranial Nerves Optic (II)] : visual acuity intact bilaterally,  pupils equal round and reactive to light [Cranial Nerves Oculomotor (III)] : extraocular motion intact [Cranial Nerves Trigeminal (V)] : facial sensation intact symmetrically [Cranial Nerves Facial (VII)] : face symmetrical [Cranial Nerves Vestibulocochlear (VIII)] : hearing was intact bilaterally [Cranial Nerves Glossopharyngeal (IX)] : tongue and palate midline [Cranial Nerves Accessory (XI - Cranial And Spinal)] : head turning and shoulder shrug symmetric [Cranial Nerves Hypoglossal (XII)] : there was no tongue deviation with protrusion [Abnormal Walk] : normal gait [Balance] : balance was intact [] : no respiratory distress [Respiration, Rhythm And Depth] : normal respiratory rhythm and effort [Exaggerated Use Of Accessory Muscles For Inspiration] : no accessory muscle use [Heart Rate And Rhythm] : heart rate was normal and rhythm regular [Erythema] : not erythematous [Tender] : not tender [Warm] : not warm [FreeTextEntry1] : right temporal area [FreeTextEntry6] : swelling noted

## 2020-03-16 ENCOUNTER — APPOINTMENT (OUTPATIENT)
Dept: ELECTROPHYSIOLOGY | Facility: CLINIC | Age: 55
End: 2020-03-16
Payer: MEDICAID

## 2020-03-16 PROCEDURE — 93298 REM INTERROG DEV EVAL SCRMS: CPT

## 2020-03-16 PROCEDURE — G2066: CPT

## 2020-04-10 ENCOUNTER — APPOINTMENT (OUTPATIENT)
Dept: NEUROSURGERY | Facility: CLINIC | Age: 55
End: 2020-04-10

## 2020-04-13 ENCOUNTER — APPOINTMENT (OUTPATIENT)
Dept: OPHTHALMOLOGY | Facility: CLINIC | Age: 55
End: 2020-04-13
Payer: MEDICAID

## 2020-04-13 ENCOUNTER — NON-APPOINTMENT (OUTPATIENT)
Age: 55
End: 2020-04-13

## 2020-04-13 PROCEDURE — 99212 OFFICE O/P EST SF 10 MIN: CPT | Mod: 95

## 2020-04-16 ENCOUNTER — APPOINTMENT (OUTPATIENT)
Dept: ELECTROPHYSIOLOGY | Facility: CLINIC | Age: 55
End: 2020-04-16
Payer: MEDICAID

## 2020-04-16 PROCEDURE — G2066: CPT

## 2020-04-16 PROCEDURE — 93298 REM INTERROG DEV EVAL SCRMS: CPT

## 2020-05-01 ENCOUNTER — APPOINTMENT (OUTPATIENT)
Dept: NEUROSURGERY | Facility: CLINIC | Age: 55
End: 2020-05-01
Payer: MEDICAID

## 2020-05-01 PROCEDURE — 99024 POSTOP FOLLOW-UP VISIT: CPT

## 2020-05-12 ENCOUNTER — NON-APPOINTMENT (OUTPATIENT)
Age: 55
End: 2020-05-12

## 2020-05-12 ENCOUNTER — APPOINTMENT (OUTPATIENT)
Dept: ENDOCRINOLOGY | Facility: CLINIC | Age: 55
End: 2020-05-12
Payer: MEDICAID

## 2020-05-12 PROCEDURE — 99442: CPT

## 2020-05-12 NOTE — ASSESSMENT
[FreeTextEntry1] : 53 year old female with history of uncontrolled DM Type 2, HLD, HTN who presented for follow up and FNA of left thyroid nodule. \par \par Uncontrolled DM2\par -Most recent HgA1C is 7.5%  \par -Continue Metformin 1000 mg BID \par -Continue Jardiance 25 mg daily\par -Continue SMBGs 2-3 times per day\par -Bariatric diet \par \par Thyroid nodule\par 12/2019: Benign FNA \par -Strong family history of thyroid cancer in sister and paternal aunt \par -10/2019 thyroid sono 1.27 x1.17 x 1.41cm solid left lower pole nodule \par -Will obtain thyroid ultrasound on the next visit \par -s/p FNA today and Affirma, will call with results \par \par HLD\par -Continue Atorvastatin 40 mg daily \par \par \par HTN\par -Continue Losartan, Nifedipine and Metoprolol\par \par \par RTC in 3 months. \par

## 2020-05-12 NOTE — REVIEW OF SYSTEMS
[Fatigue] : no fatigue [Decreased Appetite] : appetite not decreased [Recent Weight Gain (___ Lbs)] : no recent weight gain [Visual Field Defect] : no visual field defect [Recent Weight Loss (___ Lbs)] : no recent weight loss [Dry Eyes] : no dryness [Eye Pain] : no pain [Neck Pain] : no neck pain [Dysphagia] : no dysphagia [Nasal Congestion] : no nasal congestion [Dysphonia] : no dysphonia [Chest Pain] : no chest pain [Slow Heart Rate] : heart rate is not slow [Palpitations] : no palpitations [Fast Heart Rate] : heart rate is not fast [Shortness Of Breath] : no shortness of breath [Cough] : no cough [Orthopnea] : no orthopnea [Constipation] : no constipation [Nausea] : no nausea [Vomiting] : no vomiting [Diarrhea] : no diarrhea [Dysuria] : no dysuria [Polyuria] : no polyuria [Irregular Menses] : regular menses [Joint Pain] : no joint pain [Incontinence] : no incontinence [Muscle Weakness] : no muscle weakness [Myalgia] : no myalgia  [Acne] : no acne [Acanthosis] : no acanthosis  [Dry Skin] : no dry skin [Headaches] : no headaches [Tremors] : no tremors [Dizziness] : no dizziness [Pain/Numbness of Digits] : no pain/numbness of digits [Suicidal Ideation] : no suicidal ideation [Depression] : no depression [Heat Intolerance] : no heat intolerance [Polydipsia] : no polydipsia [Cold Intolerance] : no cold intolerance [Easy Bleeding] : no ~M tendency for easy bleeding [Easy Bruising] : no tendency for easy bruising [Swelling] : no swelling

## 2020-05-12 NOTE — HISTORY OF PRESENT ILLNESS
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 No \par If yes, how long on insulin?\par 03/2020 HbA1c 7.5%\par \par List Current Medications for Glycemic control and the doses:\par 1- Metformin 1000 mg BID \par 2- Jardiance 10mg daily \par \par Other Meds\par --------------\par Lipitor 80 mg daily\par Losartan 50 mg daily \par Plavix ( last dose 12/4/19) \par Nifedipine \par Metoprolol \par \par SMBG (self monitored blood glucose) readings: \par - Name of glucometer: \par - How often does the patient check BG? three times daily \par \par If detailed record is available, what is the range of most of the BG readings?\par - Before Breakfast: 130-140\par - Right after dinner: 150-160\par \par Does patient get Hypoglycemic episodes? None \par \par \par Diabetic Complications: Is patient aware of having any of those complications?\par - Eyes: Retinopathy? Yes and low grade glaucoma started on latanoprost \par  	When was the last fully dilated eye exam?01/2020, s/p laser therapy on left eye \par - Feet: 	Neuropathy? mild intermittent in hands and feet \par  	Foot Ulcers? None \par 	When was the last time patient saw a Podiatrist? 8/2019\par - Kidneys: Nephropathy? 4/2019 GFR 89 \par \par Diet: review patient's diet: \par She is currently on bariatric diet and lost 50 lbs since 2018 after her sleeve \par Increased hydration \par \par Exercise: review patient exercise habits: Walking ( Increased more than before) and steps \par \par Symptoms: Write any symptoms, concerns and issues bothering the patient which have not be addressed above: \par No polyuria or polydipsia\par +eye pain \par \par Thyroid Nodule\par ------------------\par -Previous visit found to have a 1.5 cm solid cystic low suspicion nodule present, on the left \par -On 10/2019- nodule was 1.27 x 1.17 x 1.41 cm, other cysts present as well \par -Reported that there was an incidental finding of thyroid nodule on her cervical MRI\par -Denied any compressive symptoms. Reports chronic constipation and fatigue. Does not report heat intolerance, palpitation, tremors or changes in hair and nails\par -Reported that her sister and paternal aunt has history of thyroid cancer \par

## 2020-05-20 ENCOUNTER — TRANSCRIPTION ENCOUNTER (OUTPATIENT)
Age: 55
End: 2020-05-20

## 2020-05-20 ENCOUNTER — APPOINTMENT (OUTPATIENT)
Dept: ELECTROPHYSIOLOGY | Facility: CLINIC | Age: 55
End: 2020-05-20
Payer: MEDICAID

## 2020-05-20 PROCEDURE — G2066: CPT

## 2020-05-20 PROCEDURE — 93298 REM INTERROG DEV EVAL SCRMS: CPT

## 2020-05-28 ENCOUNTER — RX RENEWAL (OUTPATIENT)
Age: 55
End: 2020-05-28

## 2020-06-01 ENCOUNTER — APPOINTMENT (OUTPATIENT)
Dept: OPHTHALMOLOGY | Facility: CLINIC | Age: 55
End: 2020-06-01
Payer: MEDICAID

## 2020-06-01 ENCOUNTER — NON-APPOINTMENT (OUTPATIENT)
Age: 55
End: 2020-06-01

## 2020-06-01 PROCEDURE — 92134 CPTRZ OPH DX IMG PST SGM RTA: CPT

## 2020-06-01 PROCEDURE — 92014 COMPRE OPH EXAM EST PT 1/>: CPT

## 2020-06-09 NOTE — PHYSICAL EXAM
[General Appearance - In No Acute Distress] : in no acute distress [General Appearance - Alert] : alert [General Appearance - Well Developed] : well developed [General Appearance - Well Nourished] : well nourished [General Appearance - Well-Appearing] : healthy appearing [] : normal voice and communication [Healing Well] : healing well [Person] : oriented to person [Time] : oriented to time [FreeTextEntry1] : right temporal area [FreeTextEntry6] : >95 % scabbed - no evidence of drainage, redness

## 2020-06-09 NOTE — HISTORY OF PRESENT ILLNESS
[Home] : at home, [unfilled] , at the time of the visit. [Medical Office: ([facility])___] : at the medical office located in  [Other:____] : [unfilled] [Verbal consent obtained from patient] : the patient, [unfilled] [FreeTextEntry1] : Meri Kc is a pleasant right handed 54 year old AA lady who presented for consultation regarding M1 stenosis. She had neuro imaging done as recommended and is here to review results and discuss treatment plan. She states that she is feeling well and denies any stroke symptoms.\par \par On 2/24/20 she underwent a right superficial temporal artery- middle cerebral artery frontal branch direct microanastomosis.\par \par Today she is participating in a telehealth visit.  She states that she is feeling well and has not had any new symptoms of weakness in extremities, difficulty speaking, facial drooping or other signs of stroke.  She is taking Plavix 75 mg daily.  Her right temporal incision is mostly healed with scabs noted.  No evidence of infection present and she denies fever.  \par \par Her cardiologist is Dr. Kamari Rizvi, [phone] [place] [address].\par \par Telehealth visit started at 2:43 pm - 3 pm.\par \par Her PCP is Dr. Maryuri Ritchie, [address] [place] [address].

## 2020-06-09 NOTE — ASSESSMENT
[FreeTextEntry1] : IMPRESSION:\par \par 1. Pt is recovering well s/p 2/24/20 she underwent a right superficial temporal artery- middle cerebral artery frontal branch direct microanastomosis.\par \par PLAN:\par 1. Advised patient to remain hydrated at all times and avoid tight fitting paraphernalia to right temporal areas at all times.\par 2. continue Plavix and follow up with stroke neurologist.\par 3. Cerebral angiogram 8/2020.\par 4.Nova MRA brain - 2/2021.\par

## 2020-06-12 ENCOUNTER — APPOINTMENT (OUTPATIENT)
Dept: SURGERY | Facility: CLINIC | Age: 55
End: 2020-06-12
Payer: MEDICAID

## 2020-06-12 VITALS — WEIGHT: 210 LBS | HEIGHT: 67 IN | BODY MASS INDEX: 32.96 KG/M2

## 2020-06-12 DIAGNOSIS — Z98.84 BARIATRIC SURGERY STATUS: ICD-10-CM

## 2020-06-12 PROCEDURE — 99213 OFFICE O/P EST LOW 20 MIN: CPT | Mod: 95

## 2020-06-12 NOTE — PLAN
[FreeTextEntry1] : Advised to continue high-protein, low carbohydrate diet.\par At least 30 minutes of exercise daily as tolerated.\par Drink plenty of fluids and take MiraLAX as needed for constipation.\par Encouraged continuing daily multivitamin.\par Follow-up 1 year. nuchal cord

## 2020-06-12 NOTE — HISTORY OF PRESENT ILLNESS
[de-identified] : De Jerez is a 54 year old female here for a follow up visit. S/P Gastric Sleeve by Dr. Fuller on 09/19/18. Last seen on  07/30/19 and weight was 245 lbs.   Today reports weight is around 210 lbs.\par \par Reports no dysphasia or reflux.  She states weight has plateaued and has been relatively stable.  She continues to walk every day for exercise.  She tries to adhere to a high-protein, low carbohydrate diet.  She reports some chronic constipation.

## 2020-06-12 NOTE — ASSESSMENT
[FreeTextEntry1] : 54-year-old female 2 years status post laparoscopic sleeve gastrectomy with total weight loss of approximately 35 pounds.

## 2020-06-12 NOTE — REASON FOR VISIT
[Home] : at home, [unfilled] , at the time of the visit. [Medical Office: ([facility])___] : at the medical office located in  [Follow-Up Visit] : a follow-up visit for [Verbal consent obtained from patient] : the patient, [unfilled]

## 2020-06-24 ENCOUNTER — APPOINTMENT (OUTPATIENT)
Dept: ELECTROPHYSIOLOGY | Facility: CLINIC | Age: 55
End: 2020-06-24
Payer: MEDICAID

## 2020-06-24 PROCEDURE — 93298 REM INTERROG DEV EVAL SCRMS: CPT

## 2020-06-24 PROCEDURE — G2066: CPT

## 2020-06-25 ENCOUNTER — TRANSCRIPTION ENCOUNTER (OUTPATIENT)
Age: 55
End: 2020-06-25

## 2020-07-16 ENCOUNTER — APPOINTMENT (OUTPATIENT)
Dept: NEUROLOGY | Facility: CLINIC | Age: 55
End: 2020-07-16
Payer: MEDICAID

## 2020-07-16 VITALS
WEIGHT: 210 LBS | SYSTOLIC BLOOD PRESSURE: 179 MMHG | HEART RATE: 54 BPM | BODY MASS INDEX: 32.96 KG/M2 | HEIGHT: 67 IN | DIASTOLIC BLOOD PRESSURE: 98 MMHG

## 2020-07-16 VITALS — TEMPERATURE: 97.6 F

## 2020-07-16 PROCEDURE — 99214 OFFICE O/P EST MOD 30 MIN: CPT

## 2020-07-23 ENCOUNTER — APPOINTMENT (OUTPATIENT)
Dept: ELECTROPHYSIOLOGY | Facility: CLINIC | Age: 55
End: 2020-07-23
Payer: MEDICAID

## 2020-07-23 VITALS — SYSTOLIC BLOOD PRESSURE: 124 MMHG | RESPIRATION RATE: 14 BRPM | HEART RATE: 58 BPM | DIASTOLIC BLOOD PRESSURE: 73 MMHG

## 2020-07-23 PROCEDURE — 93285 PRGRMG DEV EVAL SCRMS IP: CPT

## 2020-07-23 RX ORDER — CLONIDINE HYDROCHLORIDE 0.2 MG/1
0.2 TABLET ORAL TWICE DAILY
Refills: 0 | Status: ACTIVE | COMMUNITY

## 2020-07-23 RX ORDER — NIFEDIPINE 90 MG/1
90 TABLET, EXTENDED RELEASE ORAL DAILY
Refills: 0 | Status: ACTIVE | COMMUNITY

## 2020-08-31 ENCOUNTER — APPOINTMENT (OUTPATIENT)
Dept: ELECTROPHYSIOLOGY | Facility: CLINIC | Age: 55
End: 2020-08-31
Payer: MEDICAID

## 2020-08-31 PROCEDURE — 93298 REM INTERROG DEV EVAL SCRMS: CPT

## 2020-08-31 PROCEDURE — G2066: CPT

## 2020-09-04 ENCOUNTER — OUTPATIENT (OUTPATIENT)
Dept: OUTPATIENT SERVICES | Facility: HOSPITAL | Age: 55
LOS: 1 days | End: 2020-09-04
Payer: MEDICAID

## 2020-09-04 VITALS
SYSTOLIC BLOOD PRESSURE: 144 MMHG | WEIGHT: 203.05 LBS | HEIGHT: 65.5 IN | OXYGEN SATURATION: 99 % | RESPIRATION RATE: 14 BRPM | DIASTOLIC BLOOD PRESSURE: 83 MMHG | HEART RATE: 71 BPM | TEMPERATURE: 99 F

## 2020-09-04 DIAGNOSIS — Z01.818 ENCOUNTER FOR OTHER PREPROCEDURAL EXAMINATION: ICD-10-CM

## 2020-09-04 DIAGNOSIS — Z98.89 OTHER SPECIFIED POSTPROCEDURAL STATES: Chronic | ICD-10-CM

## 2020-09-04 DIAGNOSIS — I63.411 CEREBRAL INFARCTION DUE TO EMBOLISM OF RIGHT MIDDLE CEREBRAL ARTERY: ICD-10-CM

## 2020-09-04 DIAGNOSIS — I63.9 CEREBRAL INFARCTION, UNSPECIFIED: ICD-10-CM

## 2020-09-04 DIAGNOSIS — Z98.84 BARIATRIC SURGERY STATUS: Chronic | ICD-10-CM

## 2020-09-04 DIAGNOSIS — Z95.818 PRESENCE OF OTHER CARDIAC IMPLANTS AND GRAFTS: Chronic | ICD-10-CM

## 2020-09-04 DIAGNOSIS — Z98.890 OTHER SPECIFIED POSTPROCEDURAL STATES: Chronic | ICD-10-CM

## 2020-09-04 DIAGNOSIS — E11.9 TYPE 2 DIABETES MELLITUS WITHOUT COMPLICATIONS: ICD-10-CM

## 2020-09-04 LAB
ANION GAP SERPL CALC-SCNC: 15 MMOL/L — SIGNIFICANT CHANGE UP (ref 5–17)
BLD GP AB SCN SERPL QL: NEGATIVE — SIGNIFICANT CHANGE UP
BUN SERPL-MCNC: 12 MG/DL — SIGNIFICANT CHANGE UP (ref 7–23)
CALCIUM SERPL-MCNC: 10.1 MG/DL — SIGNIFICANT CHANGE UP (ref 8.4–10.5)
CHLORIDE SERPL-SCNC: 101 MMOL/L — SIGNIFICANT CHANGE UP (ref 96–108)
CO2 SERPL-SCNC: 25 MMOL/L — SIGNIFICANT CHANGE UP (ref 22–31)
CREAT SERPL-MCNC: 0.66 MG/DL — SIGNIFICANT CHANGE UP (ref 0.5–1.3)
GLUCOSE SERPL-MCNC: 117 MG/DL — HIGH (ref 70–99)
HCT VFR BLD CALC: 45.5 % — HIGH (ref 34.5–45)
HGB BLD-MCNC: 13.7 G/DL — SIGNIFICANT CHANGE UP (ref 11.5–15.5)
MCHC RBC-ENTMCNC: 23.2 PG — LOW (ref 27–34)
MCHC RBC-ENTMCNC: 30.1 GM/DL — LOW (ref 32–36)
MCV RBC AUTO: 77 FL — LOW (ref 80–100)
NRBC # BLD: 0 /100 WBCS — SIGNIFICANT CHANGE UP (ref 0–0)
PLATELET # BLD AUTO: 319 K/UL — SIGNIFICANT CHANGE UP (ref 150–400)
POTASSIUM SERPL-MCNC: 4 MMOL/L — SIGNIFICANT CHANGE UP (ref 3.5–5.3)
POTASSIUM SERPL-SCNC: 4 MMOL/L — SIGNIFICANT CHANGE UP (ref 3.5–5.3)
RBC # BLD: 5.91 M/UL — HIGH (ref 3.8–5.2)
RBC # FLD: 16.1 % — HIGH (ref 10.3–14.5)
RH IG SCN BLD-IMP: POSITIVE — SIGNIFICANT CHANGE UP
SODIUM SERPL-SCNC: 141 MMOL/L — SIGNIFICANT CHANGE UP (ref 135–145)
WBC # BLD: 7.31 K/UL — SIGNIFICANT CHANGE UP (ref 3.8–10.5)
WBC # FLD AUTO: 7.31 K/UL — SIGNIFICANT CHANGE UP (ref 3.8–10.5)

## 2020-09-04 PROCEDURE — 85027 COMPLETE CBC AUTOMATED: CPT

## 2020-09-04 PROCEDURE — G0463: CPT

## 2020-09-04 PROCEDURE — 80048 BASIC METABOLIC PNL TOTAL CA: CPT

## 2020-09-04 PROCEDURE — 86900 BLOOD TYPING SEROLOGIC ABO: CPT

## 2020-09-04 PROCEDURE — 86850 RBC ANTIBODY SCREEN: CPT

## 2020-09-04 PROCEDURE — 86901 BLOOD TYPING SEROLOGIC RH(D): CPT

## 2020-09-04 PROCEDURE — 83036 HEMOGLOBIN GLYCOSYLATED A1C: CPT

## 2020-09-04 NOTE — H&P PST ADULT - NSICDXPROBLEM_GEN_ALL_CORE_FT
PROBLEM DIAGNOSES  Problem: Cerebral infarct  Assessment and Plan: Scheduled for cerebral angiogram  Preop instructions given, to remain on Plavix  Labs pending.  COVID testing for 9/8/2020  POCT, urine for pregnancy preop  Medical evaluation 9/9/2020    Problem: DM (diabetes mellitus)  Assessment and Plan: A1c pending  Instructed to hold Metformin 24 hours preop and last dose of Jardiance will be 9/7/2020 preop  Recommend FS upon admission.

## 2020-09-04 NOTE — H&P PST ADULT - HISTORY OF PRESENT ILLNESS
Ms. Kc is a 54 year old woman with PMH Obesity s/p gastric sleeve in the past, HTN, HLD, T2DM, Sarcoidosis and glaucoma who had a stroke in 2017 secondary to intracranial artery stenosis and in 2/2020 had a right STA/MCA bypass with Dr. Mason who is now scheduled for cerebral angiogram for post op follow up.  She denies any headaches or stroke symptoms.  She has an ILR which was placed s/p stroke, followed by Dr. Kamari Rizvi.    COVID testing scheduled for 9/8/2020.

## 2020-09-04 NOTE — H&P PST ADULT - NSANTHOSAYNRD_GEN_A_CORE
sleep study negative/No. EBEN screening performed.  STOP BANG Legend: 0-2 = LOW Risk; 3-4 = INTERMEDIATE Risk; 5-8 = HIGH Risk

## 2020-09-04 NOTE — H&P PST ADULT - NEGATIVE ALLERGY TYPES
no indoor environmental allergies/no reactions to medicines/no reactions to animals/no reactions to food

## 2020-09-05 LAB
A1C WITH ESTIMATED AVERAGE GLUCOSE RESULT: 8.3 % — HIGH (ref 4–5.6)
ESTIMATED AVERAGE GLUCOSE: 192 MG/DL — HIGH (ref 68–114)

## 2020-09-08 ENCOUNTER — OUTPATIENT (OUTPATIENT)
Dept: OUTPATIENT SERVICES | Facility: HOSPITAL | Age: 55
LOS: 1 days | End: 2020-09-08
Payer: MEDICAID

## 2020-09-08 DIAGNOSIS — Z98.89 OTHER SPECIFIED POSTPROCEDURAL STATES: Chronic | ICD-10-CM

## 2020-09-08 DIAGNOSIS — Z95.818 PRESENCE OF OTHER CARDIAC IMPLANTS AND GRAFTS: Chronic | ICD-10-CM

## 2020-09-08 DIAGNOSIS — Z11.59 ENCOUNTER FOR SCREENING FOR OTHER VIRAL DISEASES: ICD-10-CM

## 2020-09-08 DIAGNOSIS — Z98.84 BARIATRIC SURGERY STATUS: Chronic | ICD-10-CM

## 2020-09-08 DIAGNOSIS — Z98.890 OTHER SPECIFIED POSTPROCEDURAL STATES: Chronic | ICD-10-CM

## 2020-09-08 LAB — SARS-COV-2 RNA SPEC QL NAA+PROBE: SIGNIFICANT CHANGE UP

## 2020-09-08 PROCEDURE — U0003: CPT

## 2020-09-11 ENCOUNTER — OUTPATIENT (OUTPATIENT)
Dept: OUTPATIENT SERVICES | Facility: HOSPITAL | Age: 55
LOS: 1 days | End: 2020-09-11
Payer: MEDICAID

## 2020-09-11 ENCOUNTER — APPOINTMENT (OUTPATIENT)
Dept: NEUROLOGY | Facility: CLINIC | Age: 55
End: 2020-09-11
Payer: MEDICAID

## 2020-09-11 ENCOUNTER — RESULT REVIEW (OUTPATIENT)
Age: 55
End: 2020-09-11

## 2020-09-11 VITALS
HEIGHT: 65 IN | WEIGHT: 207.01 LBS | RESPIRATION RATE: 16 BRPM | HEART RATE: 65 BPM | DIASTOLIC BLOOD PRESSURE: 82 MMHG | OXYGEN SATURATION: 100 % | TEMPERATURE: 98 F | SYSTOLIC BLOOD PRESSURE: 115 MMHG

## 2020-09-11 VITALS
HEART RATE: 65 BPM | OXYGEN SATURATION: 99 % | DIASTOLIC BLOOD PRESSURE: 75 MMHG | SYSTOLIC BLOOD PRESSURE: 128 MMHG | RESPIRATION RATE: 16 BRPM

## 2020-09-11 DIAGNOSIS — I63.411 CEREBRAL INFARCTION DUE TO EMBOLISM OF RIGHT MIDDLE CEREBRAL ARTERY: ICD-10-CM

## 2020-09-11 DIAGNOSIS — Z98.84 BARIATRIC SURGERY STATUS: Chronic | ICD-10-CM

## 2020-09-11 DIAGNOSIS — Z98.890 OTHER SPECIFIED POSTPROCEDURAL STATES: Chronic | ICD-10-CM

## 2020-09-11 DIAGNOSIS — Z95.818 PRESENCE OF OTHER CARDIAC IMPLANTS AND GRAFTS: Chronic | ICD-10-CM

## 2020-09-11 DIAGNOSIS — Z98.89 OTHER SPECIFIED POSTPROCEDURAL STATES: Chronic | ICD-10-CM

## 2020-09-11 LAB
ANION GAP SERPL CALC-SCNC: 7 MMOL/L — SIGNIFICANT CHANGE UP (ref 5–17)
BASOPHILS # BLD AUTO: 0.04 K/UL — SIGNIFICANT CHANGE UP (ref 0–0.2)
BASOPHILS NFR BLD AUTO: 0.8 % — SIGNIFICANT CHANGE UP (ref 0–2)
BUN SERPL-MCNC: 11 MG/DL — SIGNIFICANT CHANGE UP (ref 7–23)
CALCIUM SERPL-MCNC: 9.2 MG/DL — SIGNIFICANT CHANGE UP (ref 8.4–10.5)
CHLORIDE SERPL-SCNC: 103 MMOL/L — SIGNIFICANT CHANGE UP (ref 96–108)
CO2 SERPL-SCNC: 29 MMOL/L — SIGNIFICANT CHANGE UP (ref 22–31)
CREAT SERPL-MCNC: 0.52 MG/DL — SIGNIFICANT CHANGE UP (ref 0.5–1.3)
EOSINOPHIL # BLD AUTO: 0.09 K/UL — SIGNIFICANT CHANGE UP (ref 0–0.5)
EOSINOPHIL NFR BLD AUTO: 1.8 % — SIGNIFICANT CHANGE UP (ref 0–6)
GLUCOSE SERPL-MCNC: 200 MG/DL — HIGH (ref 70–99)
HCT VFR BLD CALC: 39.6 % — SIGNIFICANT CHANGE UP (ref 34.5–45)
HGB BLD-MCNC: 11.8 G/DL — SIGNIFICANT CHANGE UP (ref 11.5–15.5)
IMM GRANULOCYTES NFR BLD AUTO: 0.4 % — SIGNIFICANT CHANGE UP (ref 0–1.5)
LYMPHOCYTES # BLD AUTO: 1.26 K/UL — SIGNIFICANT CHANGE UP (ref 1–3.3)
LYMPHOCYTES # BLD AUTO: 24.7 % — SIGNIFICANT CHANGE UP (ref 13–44)
MCHC RBC-ENTMCNC: 23.1 PG — LOW (ref 27–34)
MCHC RBC-ENTMCNC: 29.8 GM/DL — LOW (ref 32–36)
MCV RBC AUTO: 77.6 FL — LOW (ref 80–100)
MONOCYTES # BLD AUTO: 0.43 K/UL — SIGNIFICANT CHANGE UP (ref 0–0.9)
MONOCYTES NFR BLD AUTO: 8.4 % — SIGNIFICANT CHANGE UP (ref 2–14)
NEUTROPHILS # BLD AUTO: 3.26 K/UL — SIGNIFICANT CHANGE UP (ref 1.8–7.4)
NEUTROPHILS NFR BLD AUTO: 63.9 % — SIGNIFICANT CHANGE UP (ref 43–77)
NRBC # BLD: 0 /100 WBCS — SIGNIFICANT CHANGE UP (ref 0–0)
PLATELET # BLD AUTO: 262 K/UL — SIGNIFICANT CHANGE UP (ref 150–400)
POTASSIUM SERPL-MCNC: 4.1 MMOL/L — SIGNIFICANT CHANGE UP (ref 3.5–5.3)
POTASSIUM SERPL-SCNC: 4.1 MMOL/L — SIGNIFICANT CHANGE UP (ref 3.5–5.3)
RBC # BLD: 5.1 M/UL — SIGNIFICANT CHANGE UP (ref 3.8–5.2)
RBC # FLD: 15.9 % — HIGH (ref 10.3–14.5)
SODIUM SERPL-SCNC: 139 MMOL/L — SIGNIFICANT CHANGE UP (ref 135–145)
WBC # BLD: 5.1 K/UL — SIGNIFICANT CHANGE UP (ref 3.8–10.5)
WBC # FLD AUTO: 5.1 K/UL — SIGNIFICANT CHANGE UP (ref 3.8–10.5)

## 2020-09-11 PROCEDURE — C1887: CPT

## 2020-09-11 PROCEDURE — 36226 PLACE CATH VERTEBRAL ART: CPT

## 2020-09-11 PROCEDURE — 85025 COMPLETE CBC W/AUTO DIFF WBC: CPT

## 2020-09-11 PROCEDURE — 36227 PLACE CATH XTRNL CAROTID: CPT | Mod: 50

## 2020-09-11 PROCEDURE — 36224 PLACE CATH CAROTD ART: CPT

## 2020-09-11 PROCEDURE — C1769: CPT

## 2020-09-11 PROCEDURE — 36224 PLACE CATH CAROTD ART: CPT | Mod: 50

## 2020-09-11 PROCEDURE — C1894: CPT

## 2020-09-11 PROCEDURE — 36226 PLACE CATH VERTEBRAL ART: CPT | Mod: RT

## 2020-09-11 PROCEDURE — 36227 PLACE CATH XTRNL CAROTID: CPT

## 2020-09-11 PROCEDURE — 80048 BASIC METABOLIC PNL TOTAL CA: CPT

## 2020-09-11 RX ORDER — METFORMIN HYDROCHLORIDE 850 MG/1
1000 TABLET ORAL
Refills: 0 | Status: DISCONTINUED | OUTPATIENT
Start: 2020-09-11 | End: 2020-09-25

## 2020-09-11 RX ORDER — ATORVASTATIN CALCIUM 80 MG/1
80 TABLET, FILM COATED ORAL AT BEDTIME
Refills: 0 | Status: DISCONTINUED | OUTPATIENT
Start: 2020-09-11 | End: 2020-09-25

## 2020-09-11 RX ORDER — PREGABALIN 225 MG/1
1 CAPSULE ORAL
Qty: 0 | Refills: 0 | DISCHARGE

## 2020-09-11 RX ORDER — METOPROLOL TARTRATE 50 MG
50 TABLET ORAL
Refills: 0 | Status: DISCONTINUED | OUTPATIENT
Start: 2020-09-11 | End: 2020-09-25

## 2020-09-11 RX ORDER — ASPIRIN/CALCIUM CARB/MAGNESIUM 324 MG
1 TABLET ORAL
Qty: 0 | Refills: 0 | DISCHARGE

## 2020-09-11 RX ORDER — LOSARTAN POTASSIUM 100 MG/1
100 TABLET, FILM COATED ORAL DAILY
Refills: 0 | Status: DISCONTINUED | OUTPATIENT
Start: 2020-09-11 | End: 2020-09-25

## 2020-09-11 RX ORDER — TIMOLOL 0.5 %
1 DROPS OPHTHALMIC (EYE)
Refills: 0 | Status: DISCONTINUED | OUTPATIENT
Start: 2020-09-11 | End: 2020-09-25

## 2020-09-11 NOTE — ASU PATIENT PROFILE, ADULT - PMH
Chronic back pain    DM (diabetes mellitus)    Hiatal hernia    HLD (hyperlipidemia)    HTN (hypertension)    Occlusion and stenosis of cerebral artery    Sarcoidosis  on CT chest by Dr Mercer 2018-unchanged  Status post placement of implantable loop recorder  last device check 11/22/19  Stroke  10/03/17  s/p left side weakness, resolved

## 2020-09-11 NOTE — CHART NOTE - NSCHARTNOTEFT_GEN_A_CORE
Interventional Neuro- Radiology   Procedure Note    Procedure: Selective Cerebral Angiography   Pre- Procedure Diagnosis: right STA-MCA bypass   Post- Procedure Diagnosis:    : Dr. Dhara MD    Physician Assistant: Piper Sotelo PA-C    RN: Carrie  Tech: Leif     Anesthesia: Dr. Glenna MD (MAC)      I/Os:  Fluids: DTV   Contrast:  Estimated Blood Loss: <10cc      Preliminary Report:  Under MAC, using a ___Fr short sheath to the right groin examination of left vertebral artery/ left internal carotid artery/ left external carotid artery/ right vertebral artery/ right internal carotid artery/ right external carotid artery via selective cerebral angiography demonstrates ________. ( Official note to follow).    Patient tolerated procedure well, vital signs stable, hemodynamically stable, no change in neurological status compared to baseline. Results discussed with neurosurgery/ patient and their family. Groin sheath d/c'ed, manual compression held to hemostasis, no active bleeding, no hematoma,  quick clot and safeguard balloon dressing applied at _____h. STAT labs, CBC/BMP at ____h. Patient transferred to IR recoveryU for further care/ monitoring. Interventional Neuro- Radiology   Procedure Note    Procedure: Selective Cerebral Angiography   Pre- Procedure Diagnosis: right STA-MCA bypass   Post- Procedure Diagnosis: patent STA-MCA bypass     : Dr. Dhara MD  Fellow: Dr. Socrates MD   Physician Assistant: Piper Sotelo PA-C    RN: aCrrie  Tech: Leif     Anesthesia: Dr. Glenna MD (MAC)      I/Os:  Fluids: 50cc DTV   Contrast: 64cc   Estimated Blood Loss: <10cc      Preliminary Report:  Under MAC, using a 4Fr short sheath to the right groin examination of left internal carotid artery/ left external carotid artery/ right internal carotid artery/ right external carotid artery via selective cerebral angiography demonstrates patent right STA-MCA bypass . ( Official note to follow).    Patient tolerated procedure well, vital signs stable, hemodynamically stable, no change in neurological status compared to baseline. Results discussed with neurosurgery/ patient and their family. Groin sheath d/c'ed, manual compression held to hemostasis, no active bleeding, no hematoma,  quick clot and safeguard balloon dressing applied at 11:45h. STAT labs, CBC/BMP at 15:30h. Patient transferred to IR recovery for further care/ monitoring.

## 2020-09-11 NOTE — ASU PATIENT PROFILE, ADULT - PSH
H/O hand surgery  right, tendon repair, FEB 2016  S/P eye surgery  left 10/2019  S/P laparoscopic sleeve gastrectomy  9/2018  Status post placement of implantable loop recorder  10/30/17, pt reports no events, next reading July 2019

## 2020-09-11 NOTE — CHART NOTE - NSCHARTNOTEFT_GEN_A_CORE
Interventional Neuro Radiology  Pre-Procedure Note    This is a 55yo female who had a stroke in 2017 secondary to intracranial artery stenosis and in 2/2020 had a right STA/MCA bypass with Dr. Faria. Patient presents today to neuro IR for selective cerebral angiography follow up.       Neuro Exam: Awake and alert, oriented x3, fluent, normal naming and repetition, follows 3 step commands. Extraocular movements intact, no nystagmus, visual fields full, face symmetric, tongue midline. No drift, 5/5 power x 4 extremities. Normal sensation to LT. Normal finger-to-nose and rapid alternating movements.    PAST MEDICAL & SURGICAL HISTORY:  Status post placement of implantable loop recorder: last device check 11/22/19  Occlusion and stenosis of cerebral artery  Stroke: 10/03/17, s/p left side weakness, resolved  HLD (hyperlipidemia)  Hiatal hernia  Sarcoidosis: on CT chest by Dr Mercer 2018-unchanged  DM (diabetes mellitus)  HTN (hypertension)  Chronic back pain  S/P eye surgery: left 10/2019  S/P laparoscopic sleeve gastrectomy: 9/2018  Status post placement of implantable loop recorder: 10/30/17, pt reports no events, next reading July 2019  H/O hand surgery: right, tendon repair, FEB 2016    Social History:   Denies tobacco use    FAMILY HISTORY:  Family history of MI (myocardial infarction)  Family history of coronary artery disease  Family history of diabetes mellitus (Sibling)  Family history of hypertension (Sibling)    Allergies:   penicillin (Rash)    Current Medications:   · 	acetaminophen 325 mg oral tablet: 2 tab(s) orally every 6 hours as needed for mild pain MDD:8 tabs daily  · 	Lancet: 1 each intrademal 3 times a day MDD:Please dispence 1 box  · 	Glucometer: 1 each intradermal once a day  · 	Multiple Vitamins oral tablet: 1 tab(s) orally once a day  · 	senna oral tablet: 1 tab(s) orally once a day, As needed, Constipation  · 	oxyCODONE 5 mg oral tablet: 1 tab(s) orally every 6 hours, As Needed -Moderate Pain (4 - 6) MDD:4 tabs daily  · 	acetaminophen 325 mg oral tablet: 2 tab(s) orally every 6 hours, As needed, Temp greater or equal to 38C (100.4F), Mild Pain (1 - 3)  · 	atorvastatin 80 mg oral tablet: 1 tab(s) orally once a day (at bedtime)   · 	metoprolol tartrate 50 mg oral tablet: 1 tab(s) orally 2 times a day   · 	NIFEdipine 60 mg oral tablet, extended release: 1 tab(s) orally once a day   · 	Vitamin D2 50,000 intl units (1.25 mg) oral capsule: 1 cap(s) orally once a week  · 	Vitamin B12 500 mcg oral tablet: 1 tab(s) orally once a day  · 	losartan 100 mg oral tablet: 1 tab(s) orally once a day   · 	latanoprost 0.005% ophthalmic solution: 1 drop(s) to each affected eye once a day (in the evening)  · 	timolol maleate 0.5% ophthalmic solution: 1 drop(s) to each affected eye 2 times a day  · 	cloNIDine 0.2 mg oral tablet: 1 tab(s) orally 2 times a day  · 	metFORMIN 1000 mg oral tablet: 1 tab(s) orally 2 times a day  · 	Jardiance 25 mg oral tablet: 1 tab(s) orally once a day (in the morning)  · 	aspirin 81 mg oral tablet: 1 tab(s) orally once a day    Labs:                         13.7   7.31  )-----------( 319      ( 04 Sep 2020 18:15 )             45.5       09-04    141  |  101  |  12  ----------------------------<  117<H>  4.0   |  25  |  0.66      Blood Bank: T&S available     Assessment/Plan:   This is a 55yo female  presents with right STA/MCA bypass. Patient presents to neuro-IR for selective cerebral angiography. Procedure/ risks/ benefits/ goals/ alternatives were explained. Risks include but are not limited to stroke/ vessel injury/ hemorrhage/ groin hematoma. All questions answered. Informed content obtained from patient. Consent placed in chart.    Piper Sotelo PA-C  [phone]

## 2020-09-11 NOTE — ASU DISCHARGE PLAN (ADULT/PEDIATRIC) - CARE PROVIDER_API CALL
Tutu Jules  NEUROLOGY  [address] [place] [address], [address] [place] [address]  Phone: [phone]  Fax: [phone]  Follow Up Time:

## 2020-09-27 ENCOUNTER — TRANSCRIPTION ENCOUNTER (OUTPATIENT)
Age: 55
End: 2020-09-27

## 2020-09-27 ENCOUNTER — RX RENEWAL (OUTPATIENT)
Age: 55
End: 2020-09-27

## 2020-10-05 ENCOUNTER — APPOINTMENT (OUTPATIENT)
Dept: ELECTROPHYSIOLOGY | Facility: CLINIC | Age: 55
End: 2020-10-05
Payer: MEDICAID

## 2020-10-05 PROCEDURE — 93298 REM INTERROG DEV EVAL SCRMS: CPT

## 2020-10-05 PROCEDURE — G2066: CPT

## 2020-11-09 ENCOUNTER — APPOINTMENT (OUTPATIENT)
Dept: ELECTROPHYSIOLOGY | Facility: CLINIC | Age: 55
End: 2020-11-09
Payer: MEDICAID

## 2020-11-09 PROCEDURE — 93298 REM INTERROG DEV EVAL SCRMS: CPT

## 2020-11-09 PROCEDURE — G2066: CPT

## 2020-11-11 ENCOUNTER — RX RENEWAL (OUTPATIENT)
Age: 55
End: 2020-11-11

## 2020-12-14 ENCOUNTER — APPOINTMENT (OUTPATIENT)
Dept: ELECTROPHYSIOLOGY | Facility: CLINIC | Age: 55
End: 2020-12-14
Payer: MEDICAID

## 2020-12-14 PROCEDURE — 93298 REM INTERROG DEV EVAL SCRMS: CPT

## 2020-12-14 PROCEDURE — G2066: CPT

## 2020-12-15 ENCOUNTER — RESULT REVIEW (OUTPATIENT)
Age: 55
End: 2020-12-15

## 2020-12-15 ENCOUNTER — TRANSCRIPTION ENCOUNTER (OUTPATIENT)
Age: 55
End: 2020-12-15

## 2020-12-22 ENCOUNTER — TRANSCRIPTION ENCOUNTER (OUTPATIENT)
Age: 55
End: 2020-12-22

## 2021-01-19 ENCOUNTER — APPOINTMENT (OUTPATIENT)
Dept: NEUROLOGY | Facility: CLINIC | Age: 56
End: 2021-01-19
Payer: MEDICAID

## 2021-01-19 ENCOUNTER — APPOINTMENT (OUTPATIENT)
Dept: ELECTROPHYSIOLOGY | Facility: CLINIC | Age: 56
End: 2021-01-19
Payer: MEDICAID

## 2021-01-19 VITALS — TEMPERATURE: 97.2 F

## 2021-01-19 PROCEDURE — 93886 INTRACRANIAL COMPLETE STUDY: CPT

## 2021-01-19 PROCEDURE — 93298 REM INTERROG DEV EVAL SCRMS: CPT

## 2021-01-19 PROCEDURE — 93892 TCD EMBOLI DETECT W/O INJ: CPT

## 2021-01-19 PROCEDURE — 93880 EXTRACRANIAL BILAT STUDY: CPT

## 2021-01-19 PROCEDURE — 99072 ADDL SUPL MATRL&STAF TM PHE: CPT

## 2021-01-19 PROCEDURE — G2066: CPT

## 2021-02-22 ENCOUNTER — APPOINTMENT (OUTPATIENT)
Dept: ELECTROPHYSIOLOGY | Facility: CLINIC | Age: 56
End: 2021-02-22
Payer: MEDICAID

## 2021-02-22 ENCOUNTER — NON-APPOINTMENT (OUTPATIENT)
Age: 56
End: 2021-02-22

## 2021-02-22 PROCEDURE — 93298 REM INTERROG DEV EVAL SCRMS: CPT

## 2021-02-22 PROCEDURE — G2066: CPT

## 2021-03-17 ENCOUNTER — TRANSCRIPTION ENCOUNTER (OUTPATIENT)
Age: 56
End: 2021-03-17

## 2021-03-29 ENCOUNTER — NON-APPOINTMENT (OUTPATIENT)
Age: 56
End: 2021-03-29

## 2021-03-29 ENCOUNTER — APPOINTMENT (OUTPATIENT)
Dept: ELECTROPHYSIOLOGY | Facility: CLINIC | Age: 56
End: 2021-03-29
Payer: MEDICAID

## 2021-03-29 PROCEDURE — G2066: CPT

## 2021-03-29 PROCEDURE — 93298 REM INTERROG DEV EVAL SCRMS: CPT

## 2021-04-11 ENCOUNTER — RX RENEWAL (OUTPATIENT)
Age: 56
End: 2021-04-11

## 2021-04-12 ENCOUNTER — TRANSCRIPTION ENCOUNTER (OUTPATIENT)
Age: 56
End: 2021-04-12

## 2021-04-16 ENCOUNTER — TRANSCRIPTION ENCOUNTER (OUTPATIENT)
Age: 56
End: 2021-04-16

## 2021-05-03 ENCOUNTER — NON-APPOINTMENT (OUTPATIENT)
Age: 56
End: 2021-05-03

## 2021-05-03 ENCOUNTER — APPOINTMENT (OUTPATIENT)
Dept: ELECTROPHYSIOLOGY | Facility: CLINIC | Age: 56
End: 2021-05-03
Payer: MEDICAID

## 2021-05-03 PROCEDURE — G2066: CPT

## 2021-05-03 PROCEDURE — 93298 REM INTERROG DEV EVAL SCRMS: CPT

## 2021-05-10 ENCOUNTER — APPOINTMENT (OUTPATIENT)
Dept: NEUROLOGY | Facility: CLINIC | Age: 56
End: 2021-05-10
Payer: MEDICAID

## 2021-05-10 ENCOUNTER — APPOINTMENT (OUTPATIENT)
Dept: NEUROLOGY | Facility: CLINIC | Age: 56
End: 2021-05-10

## 2021-05-10 ENCOUNTER — RX RENEWAL (OUTPATIENT)
Age: 56
End: 2021-05-10

## 2021-05-10 VITALS — DIASTOLIC BLOOD PRESSURE: 87 MMHG | HEART RATE: 60 BPM | SYSTOLIC BLOOD PRESSURE: 163 MMHG

## 2021-05-10 PROCEDURE — 99213 OFFICE O/P EST LOW 20 MIN: CPT

## 2021-05-10 PROCEDURE — 99072 ADDL SUPL MATRL&STAF TM PHE: CPT

## 2021-05-28 ENCOUNTER — APPOINTMENT (OUTPATIENT)
Dept: NEUROSURGERY | Facility: CLINIC | Age: 56
End: 2021-05-28

## 2021-06-07 ENCOUNTER — NON-APPOINTMENT (OUTPATIENT)
Age: 56
End: 2021-06-07

## 2021-06-07 ENCOUNTER — APPOINTMENT (OUTPATIENT)
Dept: ELECTROPHYSIOLOGY | Facility: CLINIC | Age: 56
End: 2021-06-07
Payer: MEDICAID

## 2021-06-07 PROCEDURE — 93298 REM INTERROG DEV EVAL SCRMS: CPT

## 2021-06-07 PROCEDURE — G2066: CPT

## 2021-06-08 ENCOUNTER — RX RENEWAL (OUTPATIENT)
Age: 56
End: 2021-06-08

## 2021-06-11 ENCOUNTER — APPOINTMENT (OUTPATIENT)
Dept: MRI IMAGING | Facility: HOSPITAL | Age: 56
End: 2021-06-11

## 2021-06-11 ENCOUNTER — OUTPATIENT (OUTPATIENT)
Dept: OUTPATIENT SERVICES | Facility: HOSPITAL | Age: 56
LOS: 1 days | End: 2021-06-11
Payer: MEDICAID

## 2021-06-11 ENCOUNTER — RESULT REVIEW (OUTPATIENT)
Age: 56
End: 2021-06-11

## 2021-06-11 DIAGNOSIS — Z98.890 OTHER SPECIFIED POSTPROCEDURAL STATES: Chronic | ICD-10-CM

## 2021-06-11 DIAGNOSIS — I63.411 CEREBRAL INFARCTION DUE TO EMBOLISM OF RIGHT MIDDLE CEREBRAL ARTERY: ICD-10-CM

## 2021-06-11 DIAGNOSIS — Z95.818 PRESENCE OF OTHER CARDIAC IMPLANTS AND GRAFTS: Chronic | ICD-10-CM

## 2021-06-11 DIAGNOSIS — Z98.84 BARIATRIC SURGERY STATUS: Chronic | ICD-10-CM

## 2021-06-11 DIAGNOSIS — Z98.89 OTHER SPECIFIED POSTPROCEDURAL STATES: Chronic | ICD-10-CM

## 2021-06-11 PROCEDURE — 70544 MR ANGIOGRAPHY HEAD W/O DYE: CPT | Mod: 26,59

## 2021-06-11 PROCEDURE — 70544 MR ANGIOGRAPHY HEAD W/O DYE: CPT

## 2021-06-11 PROCEDURE — 70551 MRI BRAIN STEM W/O DYE: CPT | Mod: 26

## 2021-06-11 PROCEDURE — 70551 MRI BRAIN STEM W/O DYE: CPT

## 2021-06-18 ENCOUNTER — APPOINTMENT (OUTPATIENT)
Dept: NEUROSURGERY | Facility: CLINIC | Age: 56
End: 2021-06-18
Payer: MEDICAID

## 2021-06-18 VITALS
HEART RATE: 55 BPM | TEMPERATURE: 98.2 F | DIASTOLIC BLOOD PRESSURE: 116 MMHG | BODY MASS INDEX: 32.96 KG/M2 | SYSTOLIC BLOOD PRESSURE: 172 MMHG | HEIGHT: 67 IN | OXYGEN SATURATION: 99 % | WEIGHT: 210 LBS

## 2021-06-18 PROCEDURE — 99215 OFFICE O/P EST HI 40 MIN: CPT

## 2021-06-23 NOTE — REASON FOR VISIT
[FreeTextEntry1] : Meri Kc is a 55yr old female here for a follow up visit after having new MRA brain NOVA done. TO review on 2/24/20 she underwent a right superficial temporal artery- middle cerebral artery frontal branch direct microanastomosis for M1 stenosis. TOday she presents feeling well, denies any new motor sensory speech visual abnormalities.

## 2021-06-23 NOTE — ASSESSMENT
[FreeTextEntry1] : IMPRESSION:\par \par 1. Pt is recovering well s/p 2/24/20 she underwent a right superficial temporal artery- middle cerebral artery frontal branch direct microanastomosis.\par \par PLAN:\par MRA Brain NOVA demonstrated good intracranial flow through the bypass \par continue to follow up with stroke neurology \par no need to follow up with us unless new symptoms arise\par

## 2021-06-23 NOTE — REVIEW OF SYSTEMS
"----- Message from Marybeth Lynch M.D. sent at 6/23/2021 11:14 AM PDT -----  Please ensure patient gets message  \"Hi Latonia the results do show that you have Candida which is yeast, your suspicion was right.  I have sent fluconazole, it is a 1 pill treatment and if symptoms do not resolve after 1 week you can repeat the dose.  If symptoms do not ultimately resolve please let me know.  Please also note do not take the fluconazole with colchicine as this can be a severe drug reaction Dr. ARGENIS Ford\"  " [Negative] : Cardiovascular

## 2021-06-23 NOTE — RESULTS/DATA
[FreeTextEntry1] : EXAM: MR BRAIN\par \par EXAM: MR ANGIO BRAIN\par \par \par PROCEDURE DATE: 06/11/2021\par \par \par \par \par INTERPRETATION: CLINICAL INDICATION: Right-sided M1 occlusion with right-sided STA to MCA bypass, follow-up\par \par \par Magnetic resonance imaging of the brain was carried out with transaxial SPGR, FLAIR, fast spin echo T2 weighted images, axial susceptibility weighted series, diffusion weighted series and sagittal T1 weighted series on a 1.5 [place] magnet.\par \par Comparison is made with the prior conventional angiogram of 9/11/2020 and brain CT 2/25/2020.\par \par The ventricles are normal in position. There is mild ex vacuo dilatation of the anterior body of the right lateral ventricle. There is old right basal ganglia infarct with hemosiderin staining. Small vessel white matter ischemic changes are identified bilaterally. No acute infarcts are seen. There has been a right frontal temporal craniotomy.\par \par 3-D axial noncontrast MRA were performed on the cervical and intracranial vessels, respectively. Intravascular flow quantification was performed using gated 2D phase contrast MR, imaged perpendicular to the vessel axis. Images were post processed NOVA software and a NOVA flow study report is available.\par \par There is good flow through a patent right-sided STA to MCA bypass graft to a distal right middle cerebral artery vessel.\par \par Flow is as follows in milliliters per minute.\par \par \par \par KAREN 142, RACA 80, RACA2 68, RSTA 77, RBYPASS 65, RM21(distal) -30(retrograde)\par \par LICA 193, LMCA 100, LACA 52, LACA2 51.\par \par BA 77, RPCA 75, RPCOM 79 anterior to posterior, LPCA 61, LPCOM 30 anterior to post.\par \par IMPRESSION: Old right basal ganglia ischemic change with ex vacuo dilatation of the right lateral ventricle. Right STA to MCA bypass graft is patent with flow identified in the distal right middle cerebral artery territory.

## 2021-06-28 ENCOUNTER — APPOINTMENT (OUTPATIENT)
Dept: OPHTHALMOLOGY | Facility: CLINIC | Age: 56
End: 2021-06-28
Payer: MEDICAID

## 2021-06-28 ENCOUNTER — NON-APPOINTMENT (OUTPATIENT)
Age: 56
End: 2021-06-28

## 2021-06-28 PROCEDURE — 92014 COMPRE OPH EXAM EST PT 1/>: CPT

## 2021-06-28 PROCEDURE — 92134 CPTRZ OPH DX IMG PST SGM RTA: CPT

## 2021-07-12 ENCOUNTER — NON-APPOINTMENT (OUTPATIENT)
Age: 56
End: 2021-07-12

## 2021-07-12 ENCOUNTER — APPOINTMENT (OUTPATIENT)
Dept: ELECTROPHYSIOLOGY | Facility: CLINIC | Age: 56
End: 2021-07-12
Payer: MEDICAID

## 2021-07-12 PROCEDURE — 93298 REM INTERROG DEV EVAL SCRMS: CPT

## 2021-07-12 PROCEDURE — G2066: CPT

## 2021-07-13 ENCOUNTER — APPOINTMENT (OUTPATIENT)
Dept: PSYCHIATRY | Facility: CLINIC | Age: 56
End: 2021-07-13
Payer: MEDICAID

## 2021-07-13 DIAGNOSIS — F09 UNSPECIFIED MENTAL DISORDER DUE TO KNOWN PHYSIOLOGICAL CONDITION: ICD-10-CM

## 2021-07-13 PROCEDURE — 90791 PSYCH DIAGNOSTIC EVALUATION: CPT

## 2021-07-20 ENCOUNTER — APPOINTMENT (OUTPATIENT)
Dept: OPHTHALMOLOGY | Facility: CLINIC | Age: 56
End: 2021-07-20
Payer: MEDICAID

## 2021-07-20 ENCOUNTER — APPOINTMENT (OUTPATIENT)
Dept: ELECTROPHYSIOLOGY | Facility: CLINIC | Age: 56
End: 2021-07-20
Payer: MEDICAID

## 2021-07-20 ENCOUNTER — NON-APPOINTMENT (OUTPATIENT)
Age: 56
End: 2021-07-20

## 2021-07-20 PROCEDURE — 92133 CPTRZD OPH DX IMG PST SGM ON: CPT

## 2021-07-20 PROCEDURE — 93285 PRGRMG DEV EVAL SCRMS IP: CPT

## 2021-07-20 PROCEDURE — 92014 COMPRE OPH EXAM EST PT 1/>: CPT

## 2021-07-20 RX ORDER — DORZOLAMIDE HYDROCHLORIDE AND TIMOLOL MALEATE 20; 5 MG/ML; MG/ML
22.3-6.8 SOLUTION/ DROPS OPHTHALMIC
Refills: 0 | Status: ACTIVE | COMMUNITY

## 2021-07-20 RX ORDER — TIMOLO/BRIMON/DORZO/LATANOP/PF 0.5-.15-2%
DROPS OPHTHALMIC (EYE)
Refills: 0 | Status: DISCONTINUED | COMMUNITY
End: 2021-07-20

## 2021-07-20 RX ORDER — LOSARTAN POTASSIUM 100 MG/1
100 TABLET, FILM COATED ORAL DAILY
Refills: 0 | Status: ACTIVE | COMMUNITY
Start: 2018-03-01

## 2021-08-03 ENCOUNTER — APPOINTMENT (OUTPATIENT)
Dept: PSYCHIATRY | Facility: CLINIC | Age: 56
End: 2021-08-03

## 2021-08-05 ENCOUNTER — RX RENEWAL (OUTPATIENT)
Age: 56
End: 2021-08-05

## 2021-08-06 ENCOUNTER — RX RENEWAL (OUTPATIENT)
Age: 56
End: 2021-08-06

## 2021-08-11 NOTE — CONSULT NOTE ADULT - PROBLEM/RECOMMENDATION-1
DISPLAY PLAN FREE TEXT Ketoconazole Pregnancy And Lactation Text: This medication is Pregnancy Category C and it isn't know if it is safe during pregnancy. It is also excreted in breast milk and breast feeding isn't recommended.

## 2021-08-16 ENCOUNTER — APPOINTMENT (OUTPATIENT)
Dept: ELECTROPHYSIOLOGY | Facility: CLINIC | Age: 56
End: 2021-08-16
Payer: MEDICAID

## 2021-08-16 ENCOUNTER — NON-APPOINTMENT (OUTPATIENT)
Age: 56
End: 2021-08-16

## 2021-08-16 PROCEDURE — 93298 REM INTERROG DEV EVAL SCRMS: CPT

## 2021-08-16 PROCEDURE — G2066: CPT

## 2021-08-30 ENCOUNTER — APPOINTMENT (OUTPATIENT)
Dept: PSYCHIATRY | Facility: CLINIC | Age: 56
End: 2021-08-30
Payer: MEDICAID

## 2021-08-30 PROCEDURE — ZZZZZ: CPT

## 2021-08-31 ENCOUNTER — NON-APPOINTMENT (OUTPATIENT)
Age: 56
End: 2021-08-31

## 2021-08-31 ENCOUNTER — APPOINTMENT (OUTPATIENT)
Dept: ELECTROPHYSIOLOGY | Facility: CLINIC | Age: 56
End: 2021-08-31
Payer: MEDICAID

## 2021-08-31 VITALS
DIASTOLIC BLOOD PRESSURE: 86 MMHG | HEIGHT: 67 IN | SYSTOLIC BLOOD PRESSURE: 151 MMHG | HEART RATE: 57 BPM | WEIGHT: 215 LBS | OXYGEN SATURATION: 99 % | BODY MASS INDEX: 33.74 KG/M2

## 2021-08-31 PROCEDURE — 99213 OFFICE O/P EST LOW 20 MIN: CPT

## 2021-08-31 PROCEDURE — 93000 ELECTROCARDIOGRAM COMPLETE: CPT

## 2021-08-31 NOTE — DISCUSSION/SUMMARY
[FreeTextEntry1] : Meri Kc is a 53y/o woman with Hx of DMII, HTN, HLD, and recent CVA in 10/2017 s/p ILR placement who presents today with ILR at end of life\par \par Impression:\par \par 1. CVA: s/p ILR placement 2017. Now ILR at end of life\par  No evidence of afib. \par Wants to proceed with ILR removal. \par \par \par \par Sincerely,\par \par Kamari Rizvi MD

## 2021-08-31 NOTE — PHYSICAL EXAM
[Normal Conjunctiva] : normal conjunctiva [Normal Venous Pressure] : normal venous pressure [No Carotid Bruit] : no carotid bruit [Normal S1, S2] : normal S1, S2 [No Murmur] : no murmur [No Rub] : no rub [No Gallop] : no gallop [Clear Lung Fields] : clear lung fields [Good Air Entry] : good air entry [No Respiratory Distress] : no respiratory distress  [Soft] : abdomen soft [Non Tender] : non-tender [No Masses/organomegaly] : no masses/organomegaly [Normal Bowel Sounds] : normal bowel sounds [Normal Gait] : normal gait [No Edema] : no edema [No Cyanosis] : no cyanosis [No Clubbing] : no clubbing [No Varicosities] : no varicosities [No Rash] : no rash [No Skin Lesions] : no skin lesions [Moves all extremities] : moves all extremities [No Focal Deficits] : no focal deficits [Normal Speech] : normal speech [Alert and Oriented] : alert and oriented [Normal memory] : normal memory [Well Developed] : well developed [No Acute Distress] : no acute distress

## 2021-09-09 DIAGNOSIS — Z01.818 ENCOUNTER FOR OTHER PREPROCEDURAL EXAMINATION: ICD-10-CM

## 2021-09-10 ENCOUNTER — APPOINTMENT (OUTPATIENT)
Dept: DISASTER EMERGENCY | Facility: CLINIC | Age: 56
End: 2021-09-10

## 2021-09-11 LAB — SARS-COV-2 N GENE NPH QL NAA+PROBE: NOT DETECTED

## 2021-09-12 ENCOUNTER — RX RENEWAL (OUTPATIENT)
Age: 56
End: 2021-09-12

## 2021-09-13 ENCOUNTER — OUTPATIENT (OUTPATIENT)
Dept: OUTPATIENT SERVICES | Facility: HOSPITAL | Age: 56
LOS: 1 days | Discharge: HOME | End: 2021-09-13
Payer: MEDICAID

## 2021-09-13 VITALS — WEIGHT: 209 LBS | HEIGHT: 67 IN

## 2021-09-13 DIAGNOSIS — Z98.890 OTHER SPECIFIED POSTPROCEDURAL STATES: Chronic | ICD-10-CM

## 2021-09-13 DIAGNOSIS — Z98.84 BARIATRIC SURGERY STATUS: Chronic | ICD-10-CM

## 2021-09-13 DIAGNOSIS — Z98.89 OTHER SPECIFIED POSTPROCEDURAL STATES: Chronic | ICD-10-CM

## 2021-09-13 DIAGNOSIS — Z95.818 PRESENCE OF OTHER CARDIAC IMPLANTS AND GRAFTS: Chronic | ICD-10-CM

## 2021-09-13 DIAGNOSIS — I63.9 CEREBRAL INFARCTION, UNSPECIFIED: ICD-10-CM

## 2021-09-13 LAB
GLUCOSE BLDC GLUCOMTR-MCNC: 220 MG/DL — HIGH (ref 70–99)
HCG UR QL: NEGATIVE — SIGNIFICANT CHANGE UP

## 2021-09-13 PROCEDURE — 33285 INSJ SUBQ CAR RHYTHM MNTR: CPT

## 2021-09-13 RX ORDER — CLOPIDOGREL BISULFATE 75 MG/1
1 TABLET, FILM COATED ORAL
Qty: 0 | Refills: 0 | DISCHARGE

## 2021-09-13 RX ORDER — LATANOPROST 0.05 MG/ML
1 SOLUTION/ DROPS OPHTHALMIC; TOPICAL
Qty: 0 | Refills: 0 | DISCHARGE

## 2021-09-13 RX ORDER — METFORMIN HYDROCHLORIDE 850 MG/1
1 TABLET ORAL
Qty: 0 | Refills: 0 | DISCHARGE

## 2021-09-13 RX ORDER — DORZOLAMIDE HYDROCHLORIDE TIMOLOL MALEATE 20; 5 MG/ML; MG/ML
1 SOLUTION/ DROPS OPHTHALMIC
Qty: 0 | Refills: 0 | DISCHARGE

## 2021-09-13 RX ORDER — LOSARTAN POTASSIUM 100 MG/1
1 TABLET, FILM COATED ORAL
Qty: 0 | Refills: 0 | DISCHARGE

## 2021-09-13 RX ORDER — METOPROLOL TARTRATE 50 MG
1 TABLET ORAL
Qty: 60 | Refills: 0 | DISCHARGE

## 2021-09-13 RX ORDER — EMPAGLIFLOZIN 10 MG/1
1 TABLET, FILM COATED ORAL
Qty: 0 | Refills: 0 | DISCHARGE

## 2021-09-13 RX ORDER — CLOPIDOGREL BISULFATE 75 MG/1
2 TABLET, FILM COATED ORAL
Qty: 0 | Refills: 0 | DISCHARGE

## 2021-09-13 RX ORDER — NIFEDIPINE 30 MG
1 TABLET, EXTENDED RELEASE 24 HR ORAL
Qty: 30 | Refills: 0 | DISCHARGE

## 2021-09-13 RX ORDER — ATORVASTATIN CALCIUM 80 MG/1
1 TABLET, FILM COATED ORAL
Qty: 30 | Refills: 0 | DISCHARGE

## 2021-09-13 RX ORDER — TIMOLOL 0.5 %
1 DROPS OPHTHALMIC (EYE)
Qty: 0 | Refills: 0 | DISCHARGE

## 2021-09-13 RX ORDER — ERGOCALCIFEROL 1.25 MG/1
1 CAPSULE ORAL
Qty: 0 | Refills: 0 | DISCHARGE

## 2021-09-13 RX ORDER — HYDRALAZINE HCL 50 MG
1 TABLET ORAL
Qty: 0 | Refills: 0 | DISCHARGE

## 2021-09-13 NOTE — H&P CARDIOLOGY - NSICDXPASTSURGICALHX_GEN_ALL_CORE_FT
PAST SURGICAL HISTORY:  H/O hand surgery right, tendon repair, FEB 2016    S/P eye surgery 10/2019 (left)    S/P laparoscopic sleeve gastrectomy 9/2018    Status post placement of implantable loop recorder

## 2021-09-13 NOTE — H&P CARDIOLOGY - NSICDXPASTMEDICALHX_GEN_ALL_CORE_FT
PAST MEDICAL HISTORY:  Chronic back pain     CVA (cerebrovascular accident)     DM (diabetes mellitus)     HLD (hyperlipidemia)     HTN (hypertension)     Occlusion and stenosis of cerebral artery     Sarcoidosis

## 2021-09-13 NOTE — CHART NOTE - NSCHARTNOTEFT_GEN_A_CORE
Type of Procedure: Implantable Loop Recorder Explant  Licensed independent practitioner: Kamari Rizvi MD  Assistant: none  Description of procedure: sterile conditions, ILR explanted 4th ICS left parasternal area  Findings of procedure: Successful removal of ILR  Estimated blood loss: < 10 cc  Specimen removed: none  Preoperative Dx: ILR EOS; h/o CVA  Postoperative Dx: ILR EOS  Complications: none  Anesthesia type: local anesthesia    EP post procedure plan of care    CXR: no  ECG: no  Antibiotics: no  Anticoagulation: N/A  Suture: no need for removal  Bedrest: 30 minutes  VTE prophylaxis: N/A          Kamari Rizvi MD

## 2021-09-13 NOTE — CHART NOTE - NSCHARTNOTEFT_GEN_A_CORE
ELECTROPHYSIOLOGY      Patient s/p ILR explant.  tolerated the procedure well. No complications.   Vital signs stable.   dressing dry and intact. No evidence of bleeding or ecchymosis.  Post procedure ILR explant teaching done. Written instructions and contact information provided .  Patient will receive a follow-up call from the device clinic on 10/28/21 at 2:20pm.

## 2021-09-13 NOTE — H&P CARDIOLOGY - HISTORY OF PRESENT ILLNESS
56 y/o female with a PMHx of cryptogenic CVA with residual memory impairments and left sided weakness (now recovered) s/p ILR placement, sarcoidosis, HTN, HLD, and DM presents for loop recorder explant. Pt had a CVA in October 2017 and had a loop recorder placed and ongoing interrogations revealed no evidence of atrial fibrillation or any cardiac etiology for stroke. Pt now requests loop recorder explant. Pt has no complaints and denies fever, chills, recent travel, headache, dizziness, visual deficits, chest pain, shortness of breath, orthopnea, palpitations, abdominal pain, N/V/D/C, hematochezia, melena, dysuria, hematuria, LOC, syncope, peripheral edema.    EP: Dr. Kamari Rizvi  COVID status: PCR negative 9/10/21

## 2021-09-15 ENCOUNTER — RX RENEWAL (OUTPATIENT)
Age: 56
End: 2021-09-15

## 2021-09-20 ENCOUNTER — APPOINTMENT (OUTPATIENT)
Dept: ELECTROPHYSIOLOGY | Facility: CLINIC | Age: 56
End: 2021-09-20

## 2021-09-21 ENCOUNTER — APPOINTMENT (OUTPATIENT)
Dept: PSYCHIATRY | Facility: CLINIC | Age: 56
End: 2021-09-21
Payer: MEDICAID

## 2021-09-21 PROBLEM — I63.9 CEREBRAL INFARCTION, UNSPECIFIED: Chronic | Status: ACTIVE | Noted: 2021-09-13

## 2021-09-21 PROCEDURE — 96136 PSYCL/NRPSYC TST PHY/QHP 1ST: CPT

## 2021-09-21 PROCEDURE — 96132 NRPSYC TST EVAL PHYS/QHP 1ST: CPT

## 2021-09-21 PROCEDURE — 96137 PSYCL/NRPSYC TST PHY/QHP EA: CPT

## 2021-09-21 PROCEDURE — 96133 NRPSYC TST EVAL PHYS/QHP EA: CPT

## 2021-09-28 ENCOUNTER — APPOINTMENT (OUTPATIENT)
Dept: ELECTROPHYSIOLOGY | Facility: CLINIC | Age: 56
End: 2021-09-28

## 2021-10-21 ENCOUNTER — APPOINTMENT (OUTPATIENT)
Dept: OPHTHALMOLOGY | Facility: CLINIC | Age: 56
End: 2021-10-21
Payer: MEDICAID

## 2021-10-21 ENCOUNTER — NON-APPOINTMENT (OUTPATIENT)
Age: 56
End: 2021-10-21

## 2021-10-21 PROCEDURE — 92012 INTRM OPH EXAM EST PATIENT: CPT

## 2021-10-21 PROCEDURE — 92083 EXTENDED VISUAL FIELD XM: CPT

## 2021-11-05 ENCOUNTER — RX RENEWAL (OUTPATIENT)
Age: 56
End: 2021-11-05

## 2021-11-10 ENCOUNTER — APPOINTMENT (OUTPATIENT)
Dept: NEUROLOGY | Facility: CLINIC | Age: 56
End: 2021-11-10
Payer: MEDICAID

## 2021-11-10 VITALS
DIASTOLIC BLOOD PRESSURE: 86 MMHG | BODY MASS INDEX: 33.74 KG/M2 | HEIGHT: 67 IN | WEIGHT: 215 LBS | SYSTOLIC BLOOD PRESSURE: 165 MMHG | HEART RATE: 58 BPM

## 2021-11-10 DIAGNOSIS — E66.9 OBESITY, UNSPECIFIED: ICD-10-CM

## 2021-11-10 PROCEDURE — 99215 OFFICE O/P EST HI 40 MIN: CPT

## 2021-11-10 RX ORDER — BRIMONIDINE TARTRATE 2 MG/MG
0.2 SOLUTION/ DROPS OPHTHALMIC
Refills: 0 | Status: ACTIVE | COMMUNITY

## 2021-11-10 RX ORDER — EMPAGLIFLOZIN 10 MG/1
10 TABLET, FILM COATED ORAL
Qty: 30 | Refills: 0 | Status: DISCONTINUED | COMMUNITY
Start: 2019-10-16 | End: 2021-11-10

## 2021-11-10 NOTE — HISTORY OF PRESENT ILLNESS
[FreeTextEntry1] : Ms. Kc 54 year old right handed  woman with vascular risk factors of HTN, DM II, HPLD and age is seen in the vascular neurology office for the evaluation and management of stroke, leading to hospital admission in 10/17. 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 She was reported to have acute onset of left facial droop, dysarthria and troubled with a sense of directions in 10/17, prompting her presentation to [facility] for further evaluation. CT brain (10/17) on admission showed early changes of ischemia in the right MCA distribution. MRI brain (10/17) showed right MCA distribution embolic-looking infarct without significant hemorrhagic transformation. CTA head and neck (10/17) is concerning for possible partial recanalization of an embolus involving distal MCA M1 bifurcation/proximal M2s vs intracranial atherosclerosis to my eye but did not show any other significant intracranial or extracranial cerebral large vessel severe stenosis or occlusion. Transesophageal echocardiogram did not show any obvious structural cardiac source of embolism nor showed any evidence of a PFO. Hypercoagulable workup is unrevealing and no evidence of primary hypercoagulable state as the evaluation by hematologist. She is currently undergoing prolonged cardiac monitoring with ICM. Sleep study did not show any evidence of obstructive sleep apnea. MRA head (1/9/18) is reported to show "high-grade stenosis at right MCA M1 bifurcation/proximal M2 segments, and moderate stenosis of the basilar artery". MRA head and neck with MRA NOVA confirmed right MCA distal M1 severe to critical stenosis.\par \par She is diagnosed not to respond to clopidogrel based on P2Y12 testing and she was advised to increased the dose of clopidogrel but has not been able to undergo follow up blood testing due to personal reasons. \par She underwent gastric sleeve and hiatus hernia surgery and tolerated the procedure well. \par She presented to [facility] ED for left arm sharp pain without any weakness or sensory paresthesia lasting for about 2 days and improved with "pain medications". Currently she denies any focal neurological symptoms and reports to have current post-stroke mRS being 0. Denies any episodes of new or recurrent focal neurological symptoms concerning for stroke or TIA since her last office visit. Reports compliance with her medications and denies any significant side effects. \par As per the verbal report, she was evaluated by sleep disorder specialist and she reports that she is not diagnosed to have EBEN. \par \par \par 7/16/2020\par On 2/24/20 she underwent a right superficial temporal artery- middle cerebral artery frontal branch direct microanastomosis for M1 stenosis with Dr. Faria. No new neurological complaints, no residual symptoms from the previous right caudate and right MCA infarct. Since last visit she states she has some BL LE swelling later in the day but denies any color, temperature changes, SOB. She states she is staying well hydrated but notices she is not urinating more. \par \par 5/10/21\par She reports for the last few months she has been experiencing Intermittent LUE and facial numbness and tingling. She also reports that at times she has L sided drooling. She denies any other neurological concerns. She is compliant with her medications and has not followed up with Dr. Faria in over a year. She also complains of short term memory concerns. \par \par 11/10/21\par She reports continuing to experience intermittent LUE and facial numbness and tingling. She still experiences the L sided drooling. She followed up with Dr. Mason in June, MRA NOVA demonstrated good intracranial flow through the bypass. She saw Dr. Rizvi, he removed the ILR, which had been in for 3 years and had not detected any episodes of PAF. Had neuropsych evaluation completed which demonstrated mild vascular neurocognitive disorder. She reports feeling well and denies any interval Stroke or TIA symptoms. MRI/MRA head demonstrated Old right basal ganglia ischemic change with ex vacuo dilatation of the right lateral ventricle. Right STA to MCA bypass graft is patent with flow identified in the distal right middle cerebral artery territory.

## 2021-11-10 NOTE — CONSULT LETTER
[Dear  ___] : Dear  [unfilled], [Please see my note below.] : Please see my note below. [Consult Closing:] : Thank you very much for allowing me to participate in the care of this patient.  If you have any questions, please do not hesitate to contact me.

## 2021-11-10 NOTE — REVIEW OF SYSTEMS
[As Noted in HPI] : as noted in HPI [Negative] : Heme/Lymph [Eye Pain] : no eye pain [FreeTextEntry3] : In the process of being evaluated for possible DM retinopathy.

## 2021-11-10 NOTE — DISCUSSION/SUMMARY
[FreeTextEntry1] : Impression:\par Cerebral embolism with cerebral infarction. Right MCA distribution embolic - stroke - likely etiology symptomatic intracranial atherosclerosis, leading to artery to artery embolism \par Clopidogrel non-responder \par \par Plan:\par Continue on Clopidogrel 150 mg once a day for secondary prevention, advised patient to let us know if she experiences easy bleeding or bruising . Patient does not need to follow up with Dr. Mason, as MRA Brain NOVA demonstrated good intracranial flow through the bypass. Continue Atorvastatin 80 mg at bedtime considering likely atheroembolic etiology of her stroke and age.  Continue to follow up with PCP/cardiology for BP and statin management (goal LDL <70) as well as all routine primary care needs. Screened patient for sleep apnea with no identifiable risk factors at this time. Will reevaluate next visit. We discussed aggressive vascular strict risk factor control. \par \par Follow up with us in 6 months or sooner if needed. TCD and dopplers to be obtained at next visit.  Patient and family were educated on signs and symptoms of stroke and to contact 911 immediately if experiencing any. \par \par Also advised to contact me upon completion of imaging studies and/or laboratory testing/investigations to discuss the results via in office, TEB visit \par \par Consult note sent to: \par PCP: Blake Ritchie (P) [phone]\par \par  [Antithrombotic therapy with ___] : antithrombotic therapy with  [unfilled] [Intensive Blood Pressure Control] : intensive blood pressure control [Lipid Lowering Therapy] : lipid lowering therapy [Patient encouraged to discuss with Primary MD] : I encouraged the patient to discuss these important issues with ~his/her~ primary care doctor [Goals and Counseling] : I have reviewed the goals of stroke risk factor modification. I counseled the patient on measures to reduce stroke risk, including the importance of medication compliance, risk factor control, exercise, healthy diet and avoidance of smoking. I reviewed stroke warning signs and symptoms and appropriate actions to take if such occur.

## 2021-11-10 NOTE — PHYSICAL EXAM
[FreeTextEntry1] : \par \par Neuro exam: \par MS: Alert, awake and is oriented to time, place and person with normal attention span\par Language: Fluent speech with intact comprehension, Normal fund of knowledge. No dysarthria. \par Cr.N.: Pupils bilaterally 3 mm in size, equal, round and reactive to light, intact visual fields to confrontation, extraocular movements intact without any diplopia, no ptosis, no nystagmus, no facial asymmetry or droop and with intact facial sensations, hearing grossly intact, tongue is in the midline, uvula elevates in the midline and without any drooping of the soft palate, normal shrugging of the shoulders bilaterally.\par \par Motor: \par Tone - Normal\par Bulk - No atrophy\par Power - No pronator drift\par RUE and RLE - 5/5\par LUE - 5/5\par LLE - 4+/5\par \par Sensory: Intact to light touch \par Cerebellar: No ataxia on finger-nose-finger\par Gait: Normal casual gait with normal stride and length \par \par

## 2021-12-07 ENCOUNTER — NON-APPOINTMENT (OUTPATIENT)
Age: 56
End: 2021-12-07

## 2021-12-13 NOTE — HISTORY OF PRESENT ILLNESS
[FreeTextEntry1] : Raimundo Ritchie MD\par \par Meri Kc is a 55y/o woman with Hx of DMII, HTN, HLD, and recent CVA in 10/2017 s/p ILR placement who presents today requesting to remove LOOP recorder. Admits doing well with no issues or complaints. Denies chest pain, palpitations, SOB, syncope or near syncope.  Methotrexate Pregnancy And Lactation Text: This medication is Pregnancy Category X and is known to cause fetal harm. This medication is excreted in breast milk.

## 2022-01-28 ENCOUNTER — RX RENEWAL (OUTPATIENT)
Age: 57
End: 2022-01-28

## 2022-02-03 ENCOUNTER — RX RENEWAL (OUTPATIENT)
Age: 57
End: 2022-02-03

## 2022-02-17 ENCOUNTER — APPOINTMENT (OUTPATIENT)
Dept: OPHTHALMOLOGY | Facility: CLINIC | Age: 57
End: 2022-02-17
Payer: MEDICAID

## 2022-02-17 ENCOUNTER — NON-APPOINTMENT (OUTPATIENT)
Age: 57
End: 2022-02-17

## 2022-02-17 PROCEDURE — 92012 INTRM OPH EXAM EST PATIENT: CPT

## 2022-02-28 ENCOUNTER — RX RENEWAL (OUTPATIENT)
Age: 57
End: 2022-02-28

## 2022-03-23 NOTE — DISCHARGE NOTE ADULT - CARE PLAN
Principal Discharge DX:	Class 2 severe obesity due to excess calories with serious comorbidity and body mass index (BMI) of 39.0 to 39.9 in adult  Goal:	Pt will engage in healthy lifestyle changes for improve quality of life  Assessment and plan of treatment:	Behavioral health and nutritional counseling  Dietary supplement  Physical activity  Follow care [facility]

## 2022-03-25 ENCOUNTER — NON-APPOINTMENT (OUTPATIENT)
Age: 57
End: 2022-03-25

## 2022-04-18 ENCOUNTER — NON-APPOINTMENT (OUTPATIENT)
Age: 57
End: 2022-04-18

## 2022-04-20 ENCOUNTER — APPOINTMENT (OUTPATIENT)
Dept: ENDOCRINOLOGY | Facility: CLINIC | Age: 57
End: 2022-04-20

## 2022-05-04 ENCOUNTER — RX RENEWAL (OUTPATIENT)
Age: 57
End: 2022-05-04

## 2022-05-10 ENCOUNTER — APPOINTMENT (OUTPATIENT)
Dept: NEUROLOGY | Facility: CLINIC | Age: 57
End: 2022-05-10

## 2022-05-11 ENCOUNTER — APPOINTMENT (OUTPATIENT)
Dept: NEUROLOGY | Facility: CLINIC | Age: 57
End: 2022-05-11
Payer: MEDICAID

## 2022-05-11 VITALS
HEART RATE: 54 BPM | HEIGHT: 67 IN | SYSTOLIC BLOOD PRESSURE: 153 MMHG | WEIGHT: 210 LBS | DIASTOLIC BLOOD PRESSURE: 85 MMHG | BODY MASS INDEX: 32.96 KG/M2 | RESPIRATION RATE: 15 BRPM

## 2022-05-11 PROCEDURE — 93880 EXTRACRANIAL BILAT STUDY: CPT

## 2022-05-11 PROCEDURE — 99214 OFFICE O/P EST MOD 30 MIN: CPT

## 2022-05-11 PROCEDURE — 93886 INTRACRANIAL COMPLETE STUDY: CPT

## 2022-05-11 PROCEDURE — 93892 TCD EMBOLI DETECT W/O INJ: CPT

## 2022-05-27 ENCOUNTER — APPOINTMENT (OUTPATIENT)
Dept: ENDOCRINOLOGY | Facility: CLINIC | Age: 57
End: 2022-05-27
Payer: MEDICAID

## 2022-05-27 VITALS
HEIGHT: 67 IN | HEART RATE: 97 BPM | OXYGEN SATURATION: 71 % | WEIGHT: 226 LBS | TEMPERATURE: 97.4 F | BODY MASS INDEX: 35.47 KG/M2 | SYSTOLIC BLOOD PRESSURE: 146 MMHG | DIASTOLIC BLOOD PRESSURE: 90 MMHG

## 2022-05-27 LAB
GLUCOSE BLDC GLUCOMTR-MCNC: 209
HBA1C MFR BLD HPLC: 8.9

## 2022-05-27 PROCEDURE — 99214 OFFICE O/P EST MOD 30 MIN: CPT | Mod: 25

## 2022-05-27 PROCEDURE — 83036 HEMOGLOBIN GLYCOSYLATED A1C: CPT | Mod: QW

## 2022-05-27 PROCEDURE — 82962 GLUCOSE BLOOD TEST: CPT

## 2022-05-27 RX ORDER — GLIMEPIRIDE 1 MG/1
1 TABLET ORAL
Qty: 180 | Refills: 3 | Status: DISCONTINUED | COMMUNITY
Start: 2021-12-07 | End: 2022-05-27

## 2022-05-27 RX ORDER — ORAL SEMAGLUTIDE 3 MG/1
3 TABLET ORAL
Qty: 30 | Refills: 0 | Status: DISCONTINUED | COMMUNITY
Start: 2021-11-12 | End: 2022-05-27

## 2022-05-27 NOTE — HISTORY OF PRESENT ILLNESS
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 No \par 8.9%\par \par List Current Medications for Glycemic control and the doses:\par 1- Metformin 1000 mg BID \par 2- Glimeperide 1 mg BID ( Stopped one week prior) \par \par Other Meds\par --------------\par Lipitor 80 mg daily\par Losartan 50 mg daily \par Plavix ( last dose 12/4/19) \par Nifedipine \par Metoprolol \par \par SMBG (self monitored blood glucose) readings: \par - Name of glucometer: \par - How often does the patient check BG? three times daily \par \par If detailed record is available, what is the range of most of the BG readings?\par BG : 200s \par \par Does patient get Hypoglycemic episodes? None \par \par \par Diabetic Complications: Is patient aware of having any of those complications?\par - Eyes: Retinopathy? Yes and low grade glaucoma started on latanoprost \par  	When was the last fully dilated eye exam?Next month \par - Feet: 	Neuropathy? In the feet at night \par  	Foot Ulcers? None \par 	When was the last time patient saw a Podiatrist? 8/2019\par - Kidneys: Nephropathy? 4/2019 GFR 89 \par \par Diet: review patient's diet: \par She is currently on bariatric diet and lost 50 lbs since 2018 after her sleeve \par Increased hydration \par \par Exercise: review patient exercise habits: Walking ( Increased more than before) and steps \par \par Symptoms: Write any symptoms, concerns and issues bothering the patient which have not be addressed above: \par No polyuria or polydipsia\par +eye pain \par \par Thyroid Nodule\par ------------------\par -Previous visit found to have a 1.5 cm solid cystic low suspicion nodule present, on the left \par -On 10/2019- nodule was 1.27 x 1.17 x 1.41 cm, other cysts present as well \par -Reported that there was an incidental finding of thyroid nodule on her cervical MRI\par -Denied any compressive symptoms. Reports chronic constipation and fatigue. Does not report heat intolerance, palpitation, tremors or changes in hair and nails\par -Reported that her sister and paternal aunt has history of thyroid cancer \par

## 2022-05-27 NOTE — PHYSICAL EXAM
[Alert] : alert [Well Nourished] : well nourished [No Acute Distress] : no acute distress [Normal Sclera/Conjunctiva] : normal sclera/conjunctiva [EOMI] : extra ocular movement intact [PERRL] : pupils equal, round and reactive to light [Normal Outer Ear/Nose] : the ears and nose were normal in appearance [Normal Hearing] : hearing was normal [Normal TMs] : both tympanic membranes were normal [No Neck Mass] : no neck mass was observed [Thyroid Not Enlarged] : the thyroid was not enlarged [No Respiratory Distress] : no respiratory distress [Clear to Auscultation] : lungs were clear to auscultation bilaterally [Normal S1, S2] : normal S1 and S2 [Normal Rate] : heart rate was normal [Regular Rhythm] : with a regular rhythm [Normal Bowel Sounds] : normal bowel sounds [Not Tender] : non-tender [Soft] : abdomen soft [Normal Gait] : normal gait [No Clubbing, Cyanosis] : no clubbing  or cyanosis of the fingernails [No Joint Swelling] : no joint swelling seen [Normal Strength/Tone] : muscle strength and tone were normal [No Rash] : no rash [No Skin Lesions] : no skin lesions [Right foot was examined, including] : right foot ~C was examined, including visual inspection with sensory and pulse exams [Left foot was examined, including] : left foot ~C was examined, including visual inspection with sensory and pulse exams [No Motor Deficits] : the motor exam was normal [Normal Reflexes] : deep tendon reflexes were 2+ and symmetric [No Tremors] : no tremors [Oriented x3] : oriented to person, place, and time [Normal Affect] : the affect was normal [Normal Insight/Judgement] : insight and judgment were intact [Normal Mood] : the mood was normal

## 2022-05-30 LAB
ALBUMIN SERPL ELPH-MCNC: 4.1 G/DL
ALP BLD-CCNC: 91 U/L
ALT SERPL-CCNC: 16 U/L
ANION GAP SERPL CALC-SCNC: 13 MMOL/L
AST SERPL-CCNC: 17 U/L
BASOPHILS # BLD AUTO: 0.06 K/UL
BASOPHILS NFR BLD AUTO: 1 %
BILIRUB SERPL-MCNC: 0.4 MG/DL
BUN SERPL-MCNC: 14 MG/DL
CALCIUM SERPL-MCNC: 10 MG/DL
CHLORIDE SERPL-SCNC: 99 MMOL/L
CHOLEST SERPL-MCNC: 158 MG/DL
CO2 SERPL-SCNC: 28 MMOL/L
CREAT SERPL-MCNC: 0.68 MG/DL
CREAT SPEC-SCNC: 279 MG/DL
EGFR: 102 ML/MIN/1.73M2
EOSINOPHIL # BLD AUTO: 0.15 K/UL
EOSINOPHIL NFR BLD AUTO: 2.5 %
GLUCOSE SERPL-MCNC: 221 MG/DL
HCT VFR BLD CALC: 41.3 %
HDLC SERPL-MCNC: 55 MG/DL
HGB BLD-MCNC: 12.7 G/DL
IMM GRANULOCYTES NFR BLD AUTO: 0.3 %
LDLC SERPL CALC-MCNC: 88 MG/DL
LYMPHOCYTES # BLD AUTO: 1.52 K/UL
LYMPHOCYTES NFR BLD AUTO: 25 %
MAN DIFF?: NORMAL
MCHC RBC-ENTMCNC: 25.2 PG
MCHC RBC-ENTMCNC: 30.8 GM/DL
MCV RBC AUTO: 81.9 FL
MICROALBUMIN 24H UR DL<=1MG/L-MCNC: 4 MG/DL
MICROALBUMIN/CREAT 24H UR-RTO: 14 MG/G
MONOCYTES # BLD AUTO: 0.47 K/UL
MONOCYTES NFR BLD AUTO: 7.7 %
NEUTROPHILS # BLD AUTO: 3.86 K/UL
NEUTROPHILS NFR BLD AUTO: 63.5 %
NONHDLC SERPL-MCNC: 103 MG/DL
PLATELET # BLD AUTO: 319 K/UL
POTASSIUM SERPL-SCNC: 4.3 MMOL/L
PROT SERPL-MCNC: 6.6 G/DL
RBC # BLD: 5.04 M/UL
RBC # FLD: 14 %
SODIUM SERPL-SCNC: 140 MMOL/L
T4 FREE SERPL-MCNC: 1.4 NG/DL
TRIGL SERPL-MCNC: 74 MG/DL
TSH SERPL-ACNC: 2.02 UIU/ML
WBC # FLD AUTO: 6.08 K/UL

## 2022-06-02 ENCOUNTER — APPOINTMENT (OUTPATIENT)
Dept: OTOLARYNGOLOGY | Facility: CLINIC | Age: 57
End: 2022-06-02
Payer: MEDICAID

## 2022-06-02 VITALS
OXYGEN SATURATION: 98 % | BODY MASS INDEX: 35.47 KG/M2 | SYSTOLIC BLOOD PRESSURE: 138 MMHG | HEIGHT: 67 IN | DIASTOLIC BLOOD PRESSURE: 81 MMHG | WEIGHT: 226 LBS | HEART RATE: 62 BPM

## 2022-06-02 DIAGNOSIS — I66.9 OCCLUSION AND STENOSIS OF UNSPECIFIED CEREBRAL ARTERY: ICD-10-CM

## 2022-06-02 DIAGNOSIS — H93.11 TINNITUS, RIGHT EAR: ICD-10-CM

## 2022-06-02 PROCEDURE — 99204 OFFICE O/P NEW MOD 45 MIN: CPT

## 2022-06-02 PROCEDURE — 92567 TYMPANOMETRY: CPT

## 2022-06-02 PROCEDURE — 92557 COMPREHENSIVE HEARING TEST: CPT

## 2022-06-02 NOTE — ASSESSMENT
[FreeTextEntry1] : Ms. KIM is a 56 year female with whooshing tinnitus in right ear x last few months\par \par - Audio to be done today \par - I have reached out to Dr. guillen who read her last MRA to see if she can r/o CPA tumors on this exam.  If not, will order MRI IAC and repeat MRA/MRV\par - pt given image scheduling instructions, but advised to wait to schedule until we place the order \par - acupuncture and tinnitus management handouts given

## 2022-06-02 NOTE — CONSULT LETTER
[Dear  ___] : Dear  [unfilled], [Consult Letter:] : I had the pleasure of evaluating your patient, [unfilled]. [Consult Closing:] : Thank you very much for allowing me to participate in the care of this patient.  If you have any questions, please do not hesitate to contact me. [FreeTextEntry3] : Ara Nichols MD\par Otolaryngology- Facial Plastics \par [address]\[place] [address]\par (P) - [phone]\par (F) - [phone]\par

## 2022-06-02 NOTE — END OF VISIT
[FreeTextEntry3] : I personally saw and examined TIESHA KIM in detail.  I spoke to FRANK Banda regarding the assessment and plan of care. I performed the procedures and relevant physical exam.  I have reviewed the above assessment and plan of care and I agree.  I have made changes to the body of the note wherever necessary and appropriate.\par

## 2022-06-02 NOTE — HISTORY OF PRESENT ILLNESS
[de-identified] : Ms. 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 Pt also had surgery 2/2020 for right MCA occlusion\par pt feels like someone is blowing in her right ear, noise is worse at night when she is quiet\par denies otalgia, otorrhea, vertigo, hearing loss.\par denies sinus pressure, rhinorrhea, nasal obstruction\par denies dysphagia, dyspnea or dysphonia\par \par \par

## 2022-06-02 NOTE — DATA REVIEWED
[de-identified] : 06/02/22: hearing wnl type As tymp right  [de-identified] : MRA 06/21: post revascularization

## 2022-06-10 ENCOUNTER — RESULT REVIEW (OUTPATIENT)
Age: 57
End: 2022-06-10

## 2022-06-16 ENCOUNTER — APPOINTMENT (OUTPATIENT)
Dept: OPHTHALMOLOGY | Facility: CLINIC | Age: 57
End: 2022-06-16
Payer: MEDICAID

## 2022-06-16 ENCOUNTER — NON-APPOINTMENT (OUTPATIENT)
Age: 57
End: 2022-06-16

## 2022-06-16 PROCEDURE — 92133 CPTRZD OPH DX IMG PST SGM ON: CPT

## 2022-06-16 PROCEDURE — 92014 COMPRE OPH EXAM EST PT 1/>: CPT

## 2022-06-16 PROCEDURE — 92083 EXTENDED VISUAL FIELD XM: CPT

## 2022-06-18 ENCOUNTER — APPOINTMENT (OUTPATIENT)
Dept: MRI IMAGING | Facility: IMAGING CENTER | Age: 57
End: 2022-06-18

## 2022-06-18 ENCOUNTER — OUTPATIENT (OUTPATIENT)
Dept: OUTPATIENT SERVICES | Facility: HOSPITAL | Age: 57
LOS: 1 days | End: 2022-06-18
Payer: MEDICAID

## 2022-06-18 DIAGNOSIS — Z95.818 PRESENCE OF OTHER CARDIAC IMPLANTS AND GRAFTS: Chronic | ICD-10-CM

## 2022-06-18 DIAGNOSIS — Z98.89 OTHER SPECIFIED POSTPROCEDURAL STATES: Chronic | ICD-10-CM

## 2022-06-18 DIAGNOSIS — Z98.890 OTHER SPECIFIED POSTPROCEDURAL STATES: Chronic | ICD-10-CM

## 2022-06-18 DIAGNOSIS — H93.11 TINNITUS, RIGHT EAR: ICD-10-CM

## 2022-06-18 DIAGNOSIS — Z98.84 BARIATRIC SURGERY STATUS: Chronic | ICD-10-CM

## 2022-06-18 DIAGNOSIS — I66.9 OCCLUSION AND STENOSIS OF UNSPECIFIED CEREBRAL ARTERY: ICD-10-CM

## 2022-06-18 PROCEDURE — 70544 MR ANGIOGRAPHY HEAD W/O DYE: CPT | Mod: 26

## 2022-06-18 PROCEDURE — 70551 MRI BRAIN STEM W/O DYE: CPT | Mod: 26

## 2022-06-18 PROCEDURE — 70551 MRI BRAIN STEM W/O DYE: CPT

## 2022-06-18 PROCEDURE — 70544 MR ANGIOGRAPHY HEAD W/O DYE: CPT

## 2022-06-23 ENCOUNTER — NON-APPOINTMENT (OUTPATIENT)
Age: 57
End: 2022-06-23

## 2022-06-28 ENCOUNTER — NON-APPOINTMENT (OUTPATIENT)
Age: 57
End: 2022-06-28

## 2022-07-19 PROBLEM — H40.003 GLAUCOMA SUSPECT OF BOTH EYES: Status: ACTIVE | Noted: 2018-06-21

## 2022-08-15 ENCOUNTER — APPOINTMENT (OUTPATIENT)
Dept: OPHTHALMOLOGY | Facility: CLINIC | Age: 57
End: 2022-08-15

## 2022-08-15 ENCOUNTER — NON-APPOINTMENT (OUTPATIENT)
Age: 57
End: 2022-08-15

## 2022-08-15 PROCEDURE — 92014 COMPRE OPH EXAM EST PT 1/>: CPT

## 2022-08-15 PROCEDURE — 92134 CPTRZ OPH DX IMG PST SGM RTA: CPT

## 2022-09-07 ENCOUNTER — APPOINTMENT (OUTPATIENT)
Dept: ENDOCRINOLOGY | Facility: CLINIC | Age: 57
End: 2022-09-07

## 2022-09-07 VITALS
OXYGEN SATURATION: 99 % | TEMPERATURE: 97.3 F | DIASTOLIC BLOOD PRESSURE: 80 MMHG | HEART RATE: 75 BPM | HEIGHT: 67 IN | SYSTOLIC BLOOD PRESSURE: 130 MMHG

## 2022-09-07 LAB
GLUCOSE BLDC GLUCOMTR-MCNC: 303
HBA1C MFR BLD HPLC: 8.8

## 2022-09-07 PROCEDURE — 82962 GLUCOSE BLOOD TEST: CPT

## 2022-09-07 PROCEDURE — 83036 HEMOGLOBIN GLYCOSYLATED A1C: CPT | Mod: QW

## 2022-09-07 PROCEDURE — 99214 OFFICE O/P EST MOD 30 MIN: CPT | Mod: 25

## 2022-09-07 RX ORDER — EMPAGLIFLOZIN 25 MG/1
25 TABLET, FILM COATED ORAL
Qty: 30 | Refills: 3 | Status: DISCONTINUED | COMMUNITY
Start: 2019-12-06 | End: 2022-09-07

## 2022-09-07 RX ORDER — PIOGLITAZONE HYDROCHLORIDE 15 MG/1
15 TABLET ORAL
Qty: 90 | Refills: 1 | Status: DISCONTINUED | COMMUNITY
Start: 2022-05-27 | End: 2022-09-07

## 2022-09-07 NOTE — HISTORY OF PRESENT ILLNESS
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 No \par 8.8%\par Cortisone shot last month. \par \par PRevious Med:\par Actos- stopped for leg swelling\par Jardiance- stopped for UTI\par \par Current Medications:\par Glimepiride 1 mg BID (6am, 9pm)\par Metformin 1000 mg BID (6am, 9pm)\par \par Other Meds\par --------------\par Lipitor 80 mg daily\par Losartan 50 mg daily \par Plavix ( last dose 19) \par Nifedipine \par Metoprolol \par \par SMBG (self monitored blood glucose) readings: \par no meter with her today:\par Report:\par Mornin, 279, 300\par Afternoon 2-3pm 1 hour after eatin-260\par Bedtime 300s. \par \par - Eyes: Retinopathy? Yes and low grade glaucoma started on latanoprost \par  	When was the last fully dilated eye exam?Next month \par - Feet: 	Neuropathy? In the feet at night \par  	Foot Ulcers? None \par 	When was the last time patient saw a Podiatrist? 2019\par - Kidneys: Nephropathy? 2019 GFR 89 \par \par Diet:\par Diet: review patient's diet: \par She is currently on bariatric diet and lost 50 lbs since 2018 after her sleeve \par Increased hydration \par \par Exercise: review patient exercise habits: Walking ( Increased more than before) and steps \par \par Symptoms: Write any symptoms, concerns and issues bothering the patient which have not be addressed above: \par No polyuria or polydipsia\par +eye pain \par \par Thyroid Nodule\par ------------------\par -Previous visit found to have a 1.5 cm solid cystic low suspicion nodule present, on the left \par -On 10/2019- nodule was 1.27 x 1.17 x 1.41 cm, other cysts present as well \par -Reported that there was an incidental finding of thyroid nodule on her cervical MRI\par -Denied any compressive symptoms. Reports chronic constipation and fatigue. Does not report heat intolerance, palpitation, tremors or changes in hair and nails\par -Reported that her sister and paternal aunt has history of thyroid cancer \par \par \par 22:\par PResents for glucose readings 200-300s. \par Recent steroid injection one month ago. \par A1c stable 8.8%\par

## 2022-09-07 NOTE — ASSESSMENT
[FreeTextEntry1] : 56 year old female with history of uncontrolled DM Type 2, HLD, HTN presetns for \par DM management. \par \par Uncontrolled DM2:\par -Most recent HgA1C is 8.8 %  9/7/22\par Cortisone shot last month, unlikely causing elevations at this time. \par \par --Actos- stopped for leg swelling\par --Jardiance- stopped for UTI\par --Has mild Nonproliferative DR.- caution with GLP. \par \par Plan:\par Stop Glimepiride 1 mg BID\par Metformin 1000 mg BID--> switch to Janumet  mg BID\par Start 15 units at bedtime of long acting insulin, Tresiba sent. \par \par HLD\par -Continue Atorvastatin 40 mg daily \par \par HTN\par -Continue Losartan, Nifedipine and Metoprolol\par \par Check CMP/UACR today.

## 2022-09-08 LAB
ALBUMIN SERPL ELPH-MCNC: 4 G/DL
ALP BLD-CCNC: 80 U/L
ALT SERPL-CCNC: 9 U/L
ANION GAP SERPL CALC-SCNC: 12 MMOL/L
AST SERPL-CCNC: 15 U/L
BILIRUB SERPL-MCNC: 0.3 MG/DL
BUN SERPL-MCNC: 15 MG/DL
CALCIUM SERPL-MCNC: 9.9 MG/DL
CHLORIDE SERPL-SCNC: 101 MMOL/L
CO2 SERPL-SCNC: 28 MMOL/L
CREAT SERPL-MCNC: 0.69 MG/DL
CREAT SPEC-SCNC: 290 MG/DL
EGFR: 102 ML/MIN/1.73M2
GLUCOSE SERPL-MCNC: 246 MG/DL
MICROALBUMIN 24H UR DL<=1MG/L-MCNC: 6.9 MG/DL
MICROALBUMIN/CREAT 24H UR-RTO: 24 MG/G
POTASSIUM SERPL-SCNC: 4.2 MMOL/L
PROT SERPL-MCNC: 6.4 G/DL
SODIUM SERPL-SCNC: 140 MMOL/L

## 2022-09-09 RX ORDER — INSULIN DEGLUDEC INJECTION 100 U/ML
100 INJECTION, SOLUTION SUBCUTANEOUS DAILY
Qty: 1 | Refills: 0 | Status: DISCONTINUED | COMMUNITY
Start: 2022-09-07 | End: 2022-09-09

## 2022-09-28 ENCOUNTER — APPOINTMENT (OUTPATIENT)
Dept: ENDOCRINOLOGY | Facility: CLINIC | Age: 57
End: 2022-09-28

## 2022-09-28 VITALS
HEIGHT: 67 IN | SYSTOLIC BLOOD PRESSURE: 120 MMHG | OXYGEN SATURATION: 99 % | DIASTOLIC BLOOD PRESSURE: 86 MMHG | WEIGHT: 238 LBS | BODY MASS INDEX: 37.35 KG/M2 | HEART RATE: 64 BPM | TEMPERATURE: 97.7 F

## 2022-09-28 PROCEDURE — 99214 OFFICE O/P EST MOD 30 MIN: CPT

## 2022-09-28 RX ORDER — ALCOHOL ANTISEPTIC PADS
PADS, MEDICATED (EA) TOPICAL
Qty: 1 | Refills: 1 | Status: ACTIVE | COMMUNITY
Start: 2022-09-28 | End: 1900-01-01

## 2022-09-28 RX ORDER — LANCING DEVICE
W/DEVICE EACH MISCELLANEOUS
Qty: 1 | Refills: 0 | Status: ACTIVE | COMMUNITY
Start: 2022-09-28 | End: 1900-01-01

## 2022-09-28 RX ORDER — FLASH GLUCOSE SCANNING READER
EACH MISCELLANEOUS
Qty: 1 | Refills: 0 | Status: ACTIVE | COMMUNITY
Start: 2022-09-28 | End: 1900-01-01

## 2022-09-28 RX ORDER — CHOLECALCIFEROL (VITAMIN D3) 10(400)/ML
DROPS ORAL
Qty: 1 | Refills: 5 | Status: ACTIVE | COMMUNITY
Start: 2022-09-28 | End: 1900-01-01

## 2022-09-29 NOTE — HISTORY OF PRESENT ILLNESS
[FreeTextEntry1] : 57 yo F presents for follow up of DM \par \par S/p Gastric sleeve on September 21st 2018 \par Brain surgery: Right sided AVM/ embolic CVA and had a bypass, on Plavix\par ( Patient was at [facility]) -she has been following up with neurology and neurosurgery \par \par Duration of Diabetes: 12+ \par Is patient on Insulin? No \par A1c 8.8% (9/2022)\par States PCP checked A1c last week and it was 9.1%.\par \par Previous Med:\par Actos- stopped for leg swelling\par Jardiance- stopped for UTI\par Trulicity in 2018- insurance stopped covering but tolerated well\par Tried Janumet  mg BID for 2 days (started 3 weeks ago) but stopped because had dizziness, "room spinning".\par \par Current Medications:\par Glimepiride 1 mg BID (6am, 9pm)- restarted after running out of insulin pen needles\par Metformin 1000 mg BID (6am, 9pm)- restarted after stopping Janumet\par Was on Tresiba 16 units qhs but stopped after running out of pen needles last week \par \par Other Meds\par --------------\par Atorvastatin 80 mg daily\par Losartan 100 mg daily \par Plavix 75 mg BID\par Nifedipine ER 90 mg daily\par Metoprolol 50 mg BID \par Hydralazine 50 mg daily\par Clonidine 0.2 mg daily\par \par SMBG (self monitored blood glucose) readings: \par no meter with her today: but states BG readings have improved since Tresiba was started, no more values over 200.\par Report:\par fbs: 130s\par post-prandial (3 hrs): 160s-170s\par bedtime: 180s\par Denies hypoglycemia. \par \par - Eyes: Retinopathy? Mild to moderate NPDR stable, and low grade glaucoma started on latanoprost \par  	When was the last fully dilated eye exam? 8/2022 \par - Feet: 	Neuropathy? In the feet at night \par  	Foot Ulcers? None \par 	When was the last time patient saw a Podiatrist? 8/2019\par - Kidneys: Nephropathy? 9/2022 \par \par Diet:     \par She is currently on bariatric diet and lost 50 lbs since 2018 after her sleeve. \par Trying not to eat after 6pm.\par Usually has candy after taking meds to help with nausea.\par breakfast: sometimes skips\par lunch/dinner: fried chicken, fries, salad\par dinner: fried chicken, part of the biscuit, mashed potatoes, string beans, baked chicken, mac and cheese, potato salad, corn bread, lima beans, stuffing\par \par Exercise: Minimal due to back and leg pain from sciatica, had cortisone injection last month with transient relief\par \par Symptoms: Write any symptoms, concerns and issues bothering the patient which have not be addressed above: \par No polyuria or polydipsia\par \par \par Thyroid Nodule\par ------------------\par -Previous visit found to have a 1.5 cm solid cystic low suspicion nodule present, on the left \par -On 10/2019- nodule was 1.27 x 1.17 x 1.41 cm, other cysts present as well \par -Reported that there was an incidental finding of thyroid nodule on her cervical MRI\par -s/p benign FNA (12/5/2019), category 2, nodular goiter\par -Denied any compressive symptoms. Reports chronic constipation and fatigue. Does not report heat intolerance, palpitation, tremors or changes in hair and nails\par -Reported that her sister and paternal aunt has history of thyroid cancer

## 2022-09-29 NOTE — PHYSICAL EXAM
[Alert] : alert [Well Nourished] : well nourished [No Acute Distress] : no acute distress [Normal Sclera/Conjunctiva] : normal sclera/conjunctiva [EOMI] : extra ocular movement intact [PERRL] : pupils equal, round and reactive to light [No Neck Mass] : no neck mass was observed [Thyroid Not Enlarged] : the thyroid was not enlarged [No Respiratory Distress] : no respiratory distress [Clear to Auscultation] : lungs were clear to auscultation bilaterally [Normal S1, S2] : normal S1 and S2 [Normal Rate] : heart rate was normal [Regular Rhythm] : with a regular rhythm [Normal Bowel Sounds] : normal bowel sounds [Not Tender] : non-tender [Soft] : abdomen soft [Normal Gait] : normal gait [No Clubbing, Cyanosis] : no clubbing  or cyanosis of the fingernails [No Joint Swelling] : no joint swelling seen [Normal Strength/Tone] : muscle strength and tone were normal [No Rash] : no rash [Right foot was examined, including] : right foot ~C was examined, including visual inspection with sensory and pulse exams [Left foot was examined, including] : left foot ~C was examined, including visual inspection with sensory and pulse exams [No Motor Deficits] : the motor exam was normal [Normal Reflexes] : deep tendon reflexes were 2+ and symmetric [No Tremors] : no tremors [Oriented x3] : oriented to person, place, and time [Normal Affect] : the affect was normal [Normal Insight/Judgement] : insight and judgment were intact [Normal Mood] : the mood was normal [No Edema] : no peripheral edema [Normal] : normal [2+] : 2+ in the dorsalis pedis [Diminished Throughout Both Feet] : diminished tactile sensation with monofilament testing throughout both feet [FreeTextEntry2] : dystrophic nails [FreeTextEntry6] : dystrophic nails

## 2022-09-29 NOTE — ASSESSMENT
[FreeTextEntry1] : 56 year old female with history of uncontrolled DM Type 2, HLD, HTN presents for DM management and thyroid nodule s/p benign FNA. \par \par Uncontrolled DM2:\par -Most recent HgA1C is 8.8% (9/7/22), but per patient was 9.1% last week.\par \par --Actos- stopped for leg swelling\par --Jardiance- stopped for UTI\par --Janumet- stopped due to dizziness (BG was not checked at the time).\par --Has mild Nonproliferative DR.- caution with GLP-1. \par \par Plan:\par Stop Glimeperide 1 mg BID\par Continue Metformin 1000 mg BID\par Restart Tresiba 16 units qhs, pen needles sent. \par -Will Rx Diana and send download in 2 weeks.\par \par HLD:\par -LDL 88 (5/2022)\par -Continue Atorvastatin 80 mg daily \par \par HTN:\par -Urine alb/cr negative (9/2022)\par -Continue Losartan, Nifedipine, Metoprolol, Hydralazine, and Clonidine.\par \par Thyroid Nodule: \par -TFTs wnl (5/2022)\par -12/2019: Benign FNA \par -Strong family history of thyroid cancer in sister and paternal aunt \par -10/2019 thyroid sonogram: 1.27 x 1.17 x 1.41 cm solid left lower pole nodule \par -Will schedule for thyroid ultrasound \par \par \par Seen and d/w Dr. Correia.\par rKisti Sanchez NP (Brenda)  Endocrine Fellow

## 2022-09-29 NOTE — REVIEW OF SYSTEMS
[Recent Weight Gain (___ Lbs)] : recent weight gain: [unfilled] lbs [Fatigue] : no fatigue [Decreased Appetite] : appetite not decreased [Recent Weight Loss (___ Lbs)] : no recent weight loss [Visual Field Defect] : no visual field defect [Dry Eyes] : no dryness [Dysphagia] : no dysphagia [Neck Pain] : no neck pain [Dysphonia] : no dysphonia [Chest Pain] : no chest pain [Palpitations] : no palpitations [Shortness Of Breath] : no shortness of breath [Cough] : no cough [Nausea] : no nausea [Constipation] : no constipation [Vomiting] : no vomiting [Diarrhea] : no diarrhea [Polyuria] : no polyuria [Joint Pain] : no joint pain [Muscle Weakness] : no muscle weakness [Acanthosis] : no acanthosis  [Acne] : no acne [Headaches] : no headaches [Dizziness] : no dizziness [Tremors] : no tremors [Pain/Numbness of Digits] : no pain/numbness of digits [Depression] : no depression [Polydipsia] : no polydipsia [Cold Intolerance] : no cold intolerance [Easy Bleeding] : no ~M tendency for easy bleeding [Easy Bruising] : no tendency for easy bruising

## 2022-10-04 ENCOUNTER — APPOINTMENT (OUTPATIENT)
Dept: ENDOCRINOLOGY | Facility: CLINIC | Age: 57
End: 2022-10-04

## 2022-10-04 PROCEDURE — ZZZZZ: CPT

## 2022-10-06 ENCOUNTER — NON-APPOINTMENT (OUTPATIENT)
Age: 57
End: 2022-10-06

## 2022-10-06 ENCOUNTER — APPOINTMENT (OUTPATIENT)
Dept: OPHTHALMOLOGY | Facility: CLINIC | Age: 57
End: 2022-10-06

## 2022-10-06 PROCEDURE — 99214 OFFICE O/P EST MOD 30 MIN: CPT

## 2022-10-13 NOTE — DIETITIAN INITIAL EVALUATION ADULT. - ORAL INTAKE PTA
Called pt, no answer. Left message for call back,   Explained we have a referral for PCP, requested call back to discuss.      good

## 2022-10-28 ENCOUNTER — RX RENEWAL (OUTPATIENT)
Age: 57
End: 2022-10-28

## 2022-11-01 ENCOUNTER — APPOINTMENT (OUTPATIENT)
Dept: OPHTHALMOLOGY | Facility: CLINIC | Age: 57
End: 2022-11-01

## 2022-11-01 ENCOUNTER — NON-APPOINTMENT (OUTPATIENT)
Age: 57
End: 2022-11-01

## 2022-11-01 PROCEDURE — 65855 TRABECULOPLASTY LASER SURG: CPT | Mod: LT

## 2022-11-07 ENCOUNTER — RX RENEWAL (OUTPATIENT)
Age: 57
End: 2022-11-07

## 2022-11-15 ENCOUNTER — APPOINTMENT (OUTPATIENT)
Dept: NEUROLOGY | Facility: CLINIC | Age: 57
End: 2022-11-15

## 2022-11-15 VITALS
DIASTOLIC BLOOD PRESSURE: 98 MMHG | HEIGHT: 67 IN | SYSTOLIC BLOOD PRESSURE: 177 MMHG | WEIGHT: 230 LBS | HEART RATE: 76 BPM | BODY MASS INDEX: 36.1 KG/M2

## 2022-11-15 DIAGNOSIS — I67.2 CEREBRAL ATHEROSCLEROSIS: ICD-10-CM

## 2022-11-15 PROCEDURE — 99215 OFFICE O/P EST HI 40 MIN: CPT

## 2022-11-15 RX ORDER — PREDNISOLONE ACETATE 10 MG/ML
1 SUSPENSION/ DROPS OPHTHALMIC
Qty: 5 | Refills: 0 | Status: ACTIVE | COMMUNITY
Start: 2022-11-01

## 2022-12-06 ENCOUNTER — APPOINTMENT (OUTPATIENT)
Dept: OPHTHALMOLOGY | Facility: CLINIC | Age: 57
End: 2022-12-06

## 2022-12-06 ENCOUNTER — NON-APPOINTMENT (OUTPATIENT)
Age: 57
End: 2022-12-06

## 2022-12-06 PROCEDURE — 65855 TRABECULOPLASTY LASER SURG: CPT | Mod: RT

## 2023-01-01 NOTE — ED ADULT TRIAGE NOTE - RESPIRATORY RATE (BREATHS/MIN)
18 Birth Sex: Male      Prenatal Complications:     Admitted From: labor/delivery    Place of Birth:     Resuscitation:     APGAR Scores:   1min:5                                                          5min: --     10 min: 7

## 2023-01-05 ENCOUNTER — NON-APPOINTMENT (OUTPATIENT)
Age: 58
End: 2023-01-05

## 2023-01-12 ENCOUNTER — APPOINTMENT (OUTPATIENT)
Dept: ORTHOPEDIC SURGERY | Facility: CLINIC | Age: 58
End: 2023-01-12
Payer: MEDICAID

## 2023-01-12 VITALS
HEART RATE: 66 BPM | WEIGHT: 239 LBS | BODY MASS INDEX: 37.51 KG/M2 | SYSTOLIC BLOOD PRESSURE: 151 MMHG | DIASTOLIC BLOOD PRESSURE: 85 MMHG | HEIGHT: 67 IN

## 2023-01-12 DIAGNOSIS — M54.50 LOW BACK PAIN, UNSPECIFIED: ICD-10-CM

## 2023-01-12 PROCEDURE — 99204 OFFICE O/P NEW MOD 45 MIN: CPT

## 2023-01-12 PROCEDURE — 72100 X-RAY EXAM L-S SPINE 2/3 VWS: CPT

## 2023-01-12 NOTE — PHYSICAL EXAM
[DP] : dorsalis pedis 2+ and symmetric bilaterally [PT] : posterior tibial 2+ and symmetric bilaterally [Normal] : Alert and in no acute distress [Poor Appearance] : well-appearing [Acute Distress] : not in acute distress [Obese] : obese [de-identified] : The patient has no respiratory distress. Mood and affect are normal. The patient is alert and oriented to person, place and time.\par The patient has tenderness of the lumbar spine to the right and left of the midline.  She has pain with lumbar flexion beyond 40 degrees.  She has pain with right and left lateral flexion of 10 degrees.  Straight leg raise on the left is positive at 25 degrees duplicating pain radiating down the left leg.  She is decree sensation on the medial and lateral aspect of the left leg.  Sensation on the right is intact.  Motor function is 5/5 bilaterally.  Deep tendon reflexes trace positive bilaterally at the knees and both ankles.  There is no pain with rotation of the hips.  There is mild pain with motion of the knees. [de-identified] : Narrative & Impression\par MRI LUMBAR SPINE:\par  \par TECHNIQUE: Sagittal: T1, STIR, T2.  Axial: T2 (disc cuts L1-S1).\par  \par CLINICAL HISTORY: Low back pain.\par  \par FINDINGS: Comparison prior lumbar MRI 01/11/2022. The lumbar spinal canal is normal in size and configuration. No acute fracture is seen. No intradural abnormalities, bony destructive lesions or paraspinal masses are noted. The distal thoracic spinal cord and conus are normal. No bony or neural anomalies are identified.\par  \par L1-2: Minimal posterior bulging of the disc annulus is noted, with mild to moderate bilateral facet arthropathy. There is mild anterior thecal sac deformity, more right-sided above the interspace level.\par L2-3: A very small central and slightly more left-sided disc herniation is noted, with mild to moderate bilateral facet arthropathy. There is mild left anterior thecal sac deformity.\par L3-4: Minimal posterior bulging of the disc annulus is noted, with mild to moderate bilateral facet arthropathy. There is very mild thecal sac deformity.\par L4-5: Mild posterior bulging of the disc annulus is noted at the interspace level, with a moderate nodular central and slightly more left-sided superiorly extruded fragment. This extends behind the lower L4 vertebral body. There is mild to moderate midline and left-sided thecal sac compression, without significant lateral root compression. Mild to moderate bilateral facet arthropathy is also demonstrated, as well as mild to moderate degenerative disc narrowing.\par L5-S1: A small central and slightly more right-sided disc herniation is noted, with early bony productive change, minimal reverse spondylolisthesis and mild bilateral facet arthropathy. This projects into the anterior epidural fat. No focal neural compression is seen.\par  \par Electronic Signature: I personally reviewed the images and agree with this report. Final Report: Dictated by  and Signed by Attending Deniz Navarro MD 11/30/2022 6:58 AM\par  \par IMPRESSION:\par 1. Minimal posterior bulging disc annulus, L1-2, with mild to moderate bilateral facet arthropathy. There is mild anterior thecal sac flattening, slightly more right-sided above the interspace level. This has slightly increased since 1/11/2022.\par 2. Very small central and slightly more left-sided disc herniation, L2-3, with mild to moderate bilateral facet arthropathy. This has also increased, but is causing only mild left anterior thecal sac deformity.\par 3. Minimal posterior bulging disc annulus, L3-4, with mild to moderate bilateral facet arthropathy. There is very mild thecal sac deformity, unchanged since the prior exam.\par 4. Mild posterior bulging disc annulus and mild to moderate facet arthropathy, L4-5 interspace level, with a moderate nodular central and slightly more left-sided superiorly extruded fragment. This is similar to the prior study, causing mild to moderate long segment midline and left-sided thecal sac deformity. No significant lateral root compression is seen.\par 4. Small central and slightly more right-sided disc herniation, L5-S1, with early bony productive change and minimal reverse spondylolisthesis. This projects into the anterior epidural fat. No focal neural compression is seen. This has decreased in size since the prior study.\par \par \par The above MRI of the lumbar spine was performed at [facility] radiology November 29, 2022 demonstrating multilevel disc disease.\par \par AP and lateral x-rays of the lumbar spine taken today demonstrate no fracture or dislocation.  There are degenerative changes primarily at L4-L5.
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
## 2023-01-12 NOTE — DISCUSSION/SUMMARY
[de-identified] : The patient has chronic lower back pain and sciatica.  She has not had relief from medical management, pain management or epidural injections.  She has attended physical therapy for 3 months without improvement.  She has some evidence of lumbar disc disease.  I have nothing additional to offer her.  She is referred to the spine service.

## 2023-01-17 ENCOUNTER — NON-APPOINTMENT (OUTPATIENT)
Age: 58
End: 2023-01-17

## 2023-01-18 ENCOUNTER — APPOINTMENT (OUTPATIENT)
Dept: OPHTHALMOLOGY | Facility: CLINIC | Age: 58
End: 2023-01-18
Payer: MEDICAID

## 2023-01-18 ENCOUNTER — NON-APPOINTMENT (OUTPATIENT)
Age: 58
End: 2023-01-18

## 2023-01-18 PROCEDURE — 92012 INTRM OPH EXAM EST PATIENT: CPT

## 2023-01-18 PROCEDURE — 92133 CPTRZD OPH DX IMG PST SGM ON: CPT

## 2023-01-20 ENCOUNTER — RX RENEWAL (OUTPATIENT)
Age: 58
End: 2023-01-20

## 2023-01-24 ENCOUNTER — APPOINTMENT (OUTPATIENT)
Dept: ORTHOPEDIC SURGERY | Facility: CLINIC | Age: 58
End: 2023-01-24

## 2023-01-24 ENCOUNTER — RX RENEWAL (OUTPATIENT)
Age: 58
End: 2023-01-24

## 2023-01-24 VITALS — BODY MASS INDEX: 37.67 KG/M2 | WEIGHT: 240 LBS | HEIGHT: 67 IN

## 2023-01-24 VITALS — DIASTOLIC BLOOD PRESSURE: 84 MMHG | HEART RATE: 64 BPM | SYSTOLIC BLOOD PRESSURE: 136 MMHG

## 2023-01-26 ENCOUNTER — APPOINTMENT (OUTPATIENT)
Dept: ORTHOPEDIC SURGERY | Facility: CLINIC | Age: 58
End: 2023-01-26
Payer: MEDICAID

## 2023-01-26 VITALS
HEART RATE: 78 BPM | TEMPERATURE: 97 F | DIASTOLIC BLOOD PRESSURE: 82 MMHG | SYSTOLIC BLOOD PRESSURE: 142 MMHG | HEIGHT: 67 IN | WEIGHT: 240 LBS | BODY MASS INDEX: 37.67 KG/M2

## 2023-01-26 PROCEDURE — 99214 OFFICE O/P EST MOD 30 MIN: CPT

## 2023-02-09 ENCOUNTER — APPOINTMENT (OUTPATIENT)
Dept: ORTHOPEDIC SURGERY | Facility: CLINIC | Age: 58
End: 2023-02-09
Payer: MEDICAID

## 2023-02-09 VITALS — WEIGHT: 240 LBS | BODY MASS INDEX: 37.67 KG/M2 | HEIGHT: 67 IN

## 2023-02-09 PROCEDURE — 99214 OFFICE O/P EST MOD 30 MIN: CPT

## 2023-03-20 ENCOUNTER — NON-APPOINTMENT (OUTPATIENT)
Age: 58
End: 2023-03-20

## 2023-03-20 ENCOUNTER — APPOINTMENT (OUTPATIENT)
Dept: OPHTHALMOLOGY | Facility: CLINIC | Age: 58
End: 2023-03-20
Payer: MEDICAID

## 2023-03-20 PROCEDURE — 99214 OFFICE O/P EST MOD 30 MIN: CPT

## 2023-03-20 PROCEDURE — 92083 EXTENDED VISUAL FIELD XM: CPT

## 2023-03-22 ENCOUNTER — NON-APPOINTMENT (OUTPATIENT)
Age: 58
End: 2023-03-22
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
## 2023-04-13 ENCOUNTER — APPOINTMENT (OUTPATIENT)
Dept: OPHTHALMOLOGY | Facility: AMBULATORY SURGERY CENTER | Age: 58
End: 2023-04-13
Payer: MEDICAID

## 2023-04-13 PROCEDURE — 66170 GLAUCOMA SURGERY: CPT | Mod: LT

## 2023-04-14 ENCOUNTER — APPOINTMENT (OUTPATIENT)
Dept: OPHTHALMOLOGY | Facility: CLINIC | Age: 58
End: 2023-04-14
Payer: MEDICAID

## 2023-04-14 ENCOUNTER — NON-APPOINTMENT (OUTPATIENT)
Age: 58
End: 2023-04-14

## 2023-04-14 PROCEDURE — 99024 POSTOP FOLLOW-UP VISIT: CPT

## 2023-04-14 RX ORDER — FLASH GLUCOSE SCANNING READER
EACH MISCELLANEOUS
Qty: 1 | Refills: 0 | Status: ACTIVE | COMMUNITY
Start: 2023-04-14 | End: 1900-01-01

## 2023-04-19 ENCOUNTER — APPOINTMENT (OUTPATIENT)
Dept: OPHTHALMOLOGY | Facility: CLINIC | Age: 58
End: 2023-04-19
Payer: MEDICAID

## 2023-04-19 ENCOUNTER — NON-APPOINTMENT (OUTPATIENT)
Age: 58
End: 2023-04-19

## 2023-04-19 PROCEDURE — 99024 POSTOP FOLLOW-UP VISIT: CPT

## 2023-04-26 ENCOUNTER — NON-APPOINTMENT (OUTPATIENT)
Age: 58
End: 2023-04-26

## 2023-04-26 ENCOUNTER — APPOINTMENT (OUTPATIENT)
Dept: OPHTHALMOLOGY | Facility: CLINIC | Age: 58
End: 2023-04-26
Payer: MEDICAID

## 2023-04-26 PROCEDURE — 99024 POSTOP FOLLOW-UP VISIT: CPT

## 2023-05-03 ENCOUNTER — NON-APPOINTMENT (OUTPATIENT)
Age: 58
End: 2023-05-03

## 2023-05-03 ENCOUNTER — APPOINTMENT (OUTPATIENT)
Dept: OPHTHALMOLOGY | Facility: CLINIC | Age: 58
End: 2023-05-03
Payer: MEDICAID

## 2023-05-03 PROCEDURE — 99024 POSTOP FOLLOW-UP VISIT: CPT

## 2023-05-16 ENCOUNTER — APPOINTMENT (OUTPATIENT)
Dept: NEUROLOGY | Facility: CLINIC | Age: 58
End: 2023-05-16
Payer: MEDICAID

## 2023-05-16 VITALS
HEIGHT: 67 IN | BODY MASS INDEX: 38.3 KG/M2 | DIASTOLIC BLOOD PRESSURE: 86 MMHG | WEIGHT: 244 LBS | SYSTOLIC BLOOD PRESSURE: 157 MMHG | HEART RATE: 61 BPM

## 2023-05-16 DIAGNOSIS — I63.411 CEREBRAL INFARCTION DUE TO EMBOLISM OF RIGHT MIDDLE CEREBRAL ARTERY: ICD-10-CM

## 2023-05-16 PROCEDURE — 99215 OFFICE O/P EST HI 40 MIN: CPT

## 2023-05-17 ENCOUNTER — NON-APPOINTMENT (OUTPATIENT)
Age: 58
End: 2023-05-17

## 2023-05-17 ENCOUNTER — APPOINTMENT (OUTPATIENT)
Dept: OPHTHALMOLOGY | Facility: CLINIC | Age: 58
End: 2023-05-17
Payer: MEDICAID

## 2023-05-17 PROCEDURE — 99024 POSTOP FOLLOW-UP VISIT: CPT

## 2023-06-13 RX ORDER — INSULIN GLARGINE 100 [IU]/ML
100 INJECTION, SOLUTION SUBCUTANEOUS
Qty: 9 | Refills: 0 | Status: ACTIVE | COMMUNITY
Start: 2022-09-09 | End: 1900-01-01

## 2023-06-19 ENCOUNTER — APPOINTMENT (OUTPATIENT)
Dept: ENDOCRINOLOGY | Facility: CLINIC | Age: 58
End: 2023-06-19
Payer: MEDICAID

## 2023-06-19 VITALS
SYSTOLIC BLOOD PRESSURE: 170 MMHG | WEIGHT: 242 LBS | OXYGEN SATURATION: 98 % | HEART RATE: 64 BPM | HEIGHT: 66 IN | DIASTOLIC BLOOD PRESSURE: 100 MMHG | BODY MASS INDEX: 38.89 KG/M2

## 2023-06-19 DIAGNOSIS — Z00.00 ENCOUNTER FOR GENERAL ADULT MEDICAL EXAMINATION W/OUT ABNORMAL FINDINGS: ICD-10-CM

## 2023-06-19 DIAGNOSIS — E04.1 NONTOXIC SINGLE THYROID NODULE: ICD-10-CM

## 2023-06-19 PROCEDURE — 95251 CONT GLUC MNTR ANALYSIS I&R: CPT

## 2023-06-19 PROCEDURE — 99214 OFFICE O/P EST MOD 30 MIN: CPT | Mod: 25

## 2023-06-19 PROCEDURE — 76536 US EXAM OF HEAD AND NECK: CPT

## 2023-06-19 PROCEDURE — 83036 HEMOGLOBIN GLYCOSYLATED A1C: CPT | Mod: QW

## 2023-06-19 NOTE — REVIEW OF SYSTEMS
[Fatigue] : no fatigue [Visual Field Defect] : no visual field defect [Dysphonia] : no dysphonia [Dysphagia] : no dysphagia [Chest Pain] : no chest pain [Palpitations] : no palpitations [Shortness Of Breath] : no shortness of breath [Nausea] : no nausea [Vomiting] : no vomiting [Polyuria] : no polyuria [Irregular Menses] : regular menses [Joint Pain] : no joint pain [Muscle Weakness] : no muscle weakness [Acanthosis] : no acanthosis  [Acne] : no acne [Headaches] : no headaches [Tremors] : no tremors [Depression] : no depression [Polydipsia] : no polydipsia [Cold Intolerance] : no cold intolerance [Easy Bleeding] : no ~M tendency for easy bleeding [Easy Bruising] : no tendency for easy bruising

## 2023-06-19 NOTE — HISTORY OF PRESENT ILLNESS
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 No \par 8.9%\par \par List Current Medications for Glycemic control and the doses:\par 1- Metformin 1000 mg BID \par 2- Basaglar 22 units at bedtime \par 3- Glimeperide 1 mg -1 mg twice daily \par \par Other Meds\par --------------\par Lipitor 80 mg daily\par Losartan 50 mg daily \par Plavix ( last dose 12/4/19) \par Nifedipine \par Metoprolol \par \par SMBG (self monitored blood glucose) readings: \par - Name of glucometer: \par - How often does the patient check BG? three times daily \par \par If detailed record is available, what is the range of most of the BG readings?\par 4-5 am : 180s \par 12pm: 200s \par 6 pm: 200-270\par \par Does patient get Hypoglycemic episodes? None \par \par \par Diabetic Complications: Is patient aware of having any of those complications?\par - Eyes: Retinopathy? Yes and low grade glaucoma started on latanoprost \par  	When was the last fully dilated eye exam?Next month \par - Feet: 	Neuropathy? In the feet at night \par  	Foot Ulcers? None \par 	When was the last time patient saw a Podiatrist? 8/2019\par - Kidneys: Nephropathy? 4/2019 GFR 89 \par \par Diet: review patient's diet: \par She is currently on bariatric diet and lost 50 lbs since 2018 after her sleeve \par Increased hydration \par \par Exercise: review patient exercise habits: Walking ( Increased more than before) and steps \par \par Symptoms: Write any symptoms, concerns and issues bothering the patient which have not be addressed above: \par No polyuria or polydipsia\par +eye pain \par \par Thyroid Nodule\par ------------------\par -Previous visit found to have a 1.5 cm solid cystic low suspicion nodule present, on the left \par -On 10/2019- nodule was 1.27 x 1.17 x 1.41 cm, other cysts present as well \par -Reported that there was an incidental finding of thyroid nodule on her cervical MRI\par -Denied any compressive symptoms. Reports chronic constipation and fatigue. Does not report heat intolerance, palpitation, tremors or changes in hair and nails\par -Reported that her sister and paternal aunt has history of thyroid cancer \par

## 2023-06-19 NOTE — HISTORY OF PRESENT ILLNESS
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 No \par 8.9%\par \par List Current Medications for Glycemic control and the doses:\par 1- Metformin 1000 mg BID \par 2- Basaglar 22 units at bedtime \par 3- Glimeperide 1 mg -1 mg twice daily \par \par Other Meds\par --------------\par Lipitor 80 mg daily\par Losartan 50 mg daily \par Plavix ( last dose 12/4/19) \par Nifedipine \par Metoprolol \par \par SMBG (self monitored blood glucose) readings: \par - Name of glucometer: \par - How often does the patient check BG? three times daily \par \par If detailed record is available, what is the range of most of the BG readings?\par 4-5 am : 180s \par 12pm: 200s \par 6 pm: 200-270\par \par Does patient get Hypoglycemic episodes? None \par \par \par Diabetic Complications: Is patient aware of having any of those complications?\par - Eyes: Retinopathy? Yes and low grade glaucoma started on latanoprost \par  	When was the last fully dilated eye exam?Next month \par - Feet: 	Neuropathy? In the feet at night \par  	Foot Ulcers? None \par 	When was the last time patient saw a Podiatrist? 8/2019\par - Kidneys: Nephropathy? 4/2019 GFR 89 \par \par Diet: review patient's diet: \par She is currently on bariatric diet and lost 50 lbs since 2018 after her sleeve \par Increased hydration \par \par Exercise: review patient exercise habits: Walking ( Increased more than before) and steps \par \par Symptoms: Write any symptoms, concerns and issues bothering the patient which have not be addressed above: \par No polyuria or polydipsia\par +eye pain \par \par Thyroid Nodule\par ------------------\par -Previous visit found to have a 1.5 cm solid cystic low suspicion nodule present, on the left \par -On 10/2019- nodule was 1.27 x 1.17 x 1.41 cm, other cysts present as well \par -Reported that there was an incidental finding of thyroid nodule on her cervical MRI\par -Denied any compressive symptoms. Reports chronic constipation and fatigue. Does not report heat intolerance, palpitation, tremors or changes in hair and nails\par -Reported that her sister and paternal aunt has history of thyroid cancer \par  Otezla Counseling: The side effects of Otezla were discussed with the patient, including but not limited to worsening or new depression, weight loss, diarrhea, nausea, upper respiratory tract infection, and headache. Patient instructed to call the office should any adverse effect occur.  The patient verbalized understanding of the proper use and possible adverse effects of Otezla.  All the patient's questions and concerns were addressed.

## 2023-06-19 NOTE — PHYSICAL EXAM
[Alert] : alert [Well Nourished] : well nourished [No Acute Distress] : no acute distress [Normal Sclera/Conjunctiva] : normal sclera/conjunctiva [PERRL] : pupils equal, round and reactive to light [Normal Outer Ear/Nose] : the ears and nose were normal in appearance [Normal TMs] : both tympanic membranes were normal [No Neck Mass] : no neck mass was observed [Thyroid Not Enlarged] : the thyroid was not enlarged [No Respiratory Distress] : no respiratory distress [Normal S1, S2] : normal S1 and S2 [Normal Rate] : heart rate was normal [Regular Rhythm] : with a regular rhythm [Normal Bowel Sounds] : normal bowel sounds [Normal Gait] : normal gait [No Joint Swelling] : no joint swelling seen [No Rash] : no rash [No Skin Lesions] : no skin lesions [Normal Reflexes] : deep tendon reflexes were 2+ and symmetric [Oriented x3] : oriented to person, place, and time [Normal Insight/Judgement] : insight and judgment were intact

## 2023-06-19 NOTE — ASSESSMENT
[FreeTextEntry1] : 57 year old female with history of uncontrolled DM Type 2, HLD, HTN presents for DM management and thyroid nodule s/p benign FNA. \par \par Uncontrolled DM2:\par -Most recent HgA1C is 7.6%\par \par (--Actos- stopped for leg swelling--Jardiance- stopped for UTI--Janumet- stopped due to dizziness (BG was not checked at the time--Has mild Nonproliferative DR.- caution with GLP-1)\par \par Plan:\par Continue Metformin 1000 mg BID\par Increase Basaglar to 22 units at bedtime . \par -Will Rx Diana and send download in 2 weeks.\par -Decrease Glimperide to 1 mg daily \par -Start Mounjaro 2.5 mg weekly \par \par \par Download and Analysis of Continuous Glucose Monitoring Data:\par Indication for device: Insulin use \par Device :Diana \par Dates reviewed:06/06/2023-06/19/2023\par Date of data printout:06/19/2023 \par Analysis and Interpretation of CGM data: \par High: 45%, Very high: 14%, Target range: 41%, Glucose management indicator: 8%, Glucose variability: 25.3% \par Blood glucose pattern: Notable nighttime highs and elevated post prandial BG levels \par \par \par HLD:\par -LDL 88 (5/2022)\par -Continue Atorvastatin 80 mg daily \par \par HTN:\par -Urine alb/cr negative (9/2022)\par -Continue Losartan, Nifedipine, Metoprolol, Hydralazine, and Clonidine.\par \par Thyroid Nodule: \par -TFTs wnl (5/2022)\par -12/2019: Benign FNA \par -Strong family history of thyroid cancer in sister and paternal aunt \par -today thyroid sonogram: 1.88 x 1.04 x 1.5 cm solid left mid pole nodule \par -Will schedule for thyroid ultrasound in one year \par \par Follow up in 4 months \par \par

## 2023-06-19 NOTE — IMPRESSION
[FreeTextEntry1] : Multiple thyroid nodules displaying interval stability [FreeTextEntry2] : Follow up ultrasound in one year

## 2023-06-19 NOTE — ADDENDUM
[FreeTextEntry1] : Patient presented at office today for education on Mounjaro. Educated on drug indication, administration, common side effects, rotation, medication storage, and sharps disposal. Understanding evidenced by pt teach back method, and return demonstration by pt,. provided. No outstanding issues or questions. Angeline Gomes RN

## 2023-06-19 NOTE — PROCEDURE
[COLOURlovers e 2008 model, 10-12 MHz frequencies] : multiple real time longitudinal and transverse images were obtained using a high resolution ultrasound with a linear transducer, COLOURlovers e 2008 model, 10-12 MHz frequencies. All measurements will be reported as longitudinal x gris-posterior x transverse. [] : a homogeneous parenchyma [Right Thyroid] : right [Lower] : lower pole there is a  [Mixed] : mixed [Upper] : upper pole there is a  [Cyst] : cyst [Left Thyroid] : left [Mid] : mid pole there is a  [Spongiform Appearance] : spongiform appearance [Ovoid] : ovoid in shape [Smooth] : smooth [Thin] : has a thin halo [Peripheral vascularity] : peripheral vascularity [2] : 2 [No abnormal lymph nodes are seen.] : no abnormal lymph nodes are seen [FreeTextEntry1] : 3.82 x 1.55 x 1.84 [FreeTextEntry5] : 3.25 x 2.21 x 2.24 [FreeTextEntry2] : 0.32 [FreeTextEntry3] : 1.88 x 1.04 x 1.5

## 2023-06-19 NOTE — PROCEDURE
[Fiberspar e 2008 model, 10-12 MHz frequencies] : multiple real time longitudinal and transverse images were obtained using a high resolution ultrasound with a linear transducer, Fiberspar e 2008 model, 10-12 MHz frequencies. All measurements will be reported as longitudinal x gris-posterior x transverse. [] : a homogeneous parenchyma [Right Thyroid] : right [Lower] : lower pole there is a  [Mixed] : mixed [Upper] : upper pole there is a  [Cyst] : cyst [Left Thyroid] : left [Mid] : mid pole there is a  [Spongiform Appearance] : spongiform appearance [Ovoid] : ovoid in shape [Smooth] : smooth [Thin] : has a thin halo [Peripheral vascularity] : peripheral vascularity [2] : 2 [No abnormal lymph nodes are seen.] : no abnormal lymph nodes are seen [FreeTextEntry1] : 3.82 x 1.55 x 1.84 [FreeTextEntry5] : 3.25 x 2.21 x 2.24 [FreeTextEntry2] : 0.32 [FreeTextEntry3] : 1.88 x 1.04 x 1.5

## 2023-06-23 ENCOUNTER — APPOINTMENT (OUTPATIENT)
Dept: OPHTHALMOLOGY | Facility: CLINIC | Age: 58
End: 2023-06-23
Payer: MEDICAID

## 2023-06-23 ENCOUNTER — NON-APPOINTMENT (OUTPATIENT)
Age: 58
End: 2023-06-23

## 2023-06-23 PROCEDURE — 99024 POSTOP FOLLOW-UP VISIT: CPT

## 2023-06-25 LAB
ANION GAP SERPL CALC-SCNC: 11 MMOL/L
BUN SERPL-MCNC: 21 MG/DL
CALCIUM SERPL-MCNC: 10 MG/DL
CHLORIDE SERPL-SCNC: 104 MMOL/L
CHOLEST SERPL-MCNC: 199 MG/DL
CO2 SERPL-SCNC: 27 MMOL/L
CREAT SERPL-MCNC: 0.94 MG/DL
CREAT SPEC-SCNC: 221 MG/DL
EGFR: 71 ML/MIN/1.73M2
GLUCOSE SERPL-MCNC: 180 MG/DL
HBA1C MFR BLD HPLC: 7.6
HDLC SERPL-MCNC: 44 MG/DL
LDLC SERPL CALC-MCNC: 129 MG/DL
MICROALBUMIN 24H UR DL<=1MG/L-MCNC: 1.6 MG/DL
MICROALBUMIN/CREAT 24H UR-RTO: 7 MG/G
NONHDLC SERPL-MCNC: 154 MG/DL
POTASSIUM SERPL-SCNC: 4.1 MMOL/L
SODIUM SERPL-SCNC: 142 MMOL/L
T4 FREE SERPL-MCNC: 1.1 NG/DL
TRIGL SERPL-MCNC: 124 MG/DL
TSH SERPL-ACNC: 1.02 UIU/ML

## 2023-06-26 ENCOUNTER — NON-APPOINTMENT (OUTPATIENT)
Age: 58
End: 2023-06-26

## 2023-06-29 RX ORDER — PEN NEEDLE, DIABETIC 29 G X1/2"
32G X 4 MM NEEDLE, DISPOSABLE MISCELLANEOUS
Qty: 1 | Refills: 1 | Status: ACTIVE | COMMUNITY
Start: 2022-09-28 | End: 1900-01-01

## 2023-07-05 ENCOUNTER — APPOINTMENT (OUTPATIENT)
Dept: OPHTHALMOLOGY | Facility: CLINIC | Age: 58
End: 2023-07-05
Payer: MEDICAID

## 2023-07-05 ENCOUNTER — NON-APPOINTMENT (OUTPATIENT)
Age: 58
End: 2023-07-05

## 2023-07-05 PROCEDURE — 92012 INTRM OPH EXAM EST PATIENT: CPT | Mod: 24

## 2023-07-24 ENCOUNTER — NON-APPOINTMENT (OUTPATIENT)
Age: 58
End: 2023-07-24

## 2023-07-24 ENCOUNTER — APPOINTMENT (OUTPATIENT)
Dept: OPHTHALMOLOGY | Facility: CLINIC | Age: 58
End: 2023-07-24
Payer: MEDICAID

## 2023-07-24 PROCEDURE — 92012 INTRM OPH EXAM EST PATIENT: CPT

## 2023-07-25 NOTE — ED PROVIDER NOTE - DISCHARGE DATE
What Type Of Note Output Would You Prefer (Optional)?: Standard Output What Is The Reason For Today's Visit?: Full Body Skin Examination with No Concerns What Is The Reason For Today's Visit? (Being Monitored For X): concerning skin lesions on a periodic basis 07-Sep-2018

## 2023-07-31 RX ORDER — INSULIN GLARGINE 100 [IU]/ML
100 INJECTION, SOLUTION SUBCUTANEOUS
Qty: 9 | Refills: 0 | Status: ACTIVE | COMMUNITY
Start: 2023-01-20

## 2023-08-14 NOTE — PATIENT PROFILE ADULT. - PRO INTERPRETER NEED 2
Will keep appointment with Dr. Jen Hankins on 8/29 per request to further discuss options per patient/family request. English

## 2023-09-06 ENCOUNTER — NON-APPOINTMENT (OUTPATIENT)
Age: 58
End: 2023-09-06

## 2023-09-06 ENCOUNTER — APPOINTMENT (OUTPATIENT)
Dept: OPHTHALMOLOGY | Facility: CLINIC | Age: 58
End: 2023-09-06
Payer: MEDICAID

## 2023-09-06 PROCEDURE — 92012 INTRM OPH EXAM EST PATIENT: CPT

## 2023-09-08 ENCOUNTER — NON-APPOINTMENT (OUTPATIENT)
Age: 58
End: 2023-09-08

## 2023-09-13 ENCOUNTER — NON-APPOINTMENT (OUTPATIENT)
Age: 58
End: 2023-09-13

## 2023-09-13 ENCOUNTER — APPOINTMENT (OUTPATIENT)
Dept: ORTHOPEDIC SURGERY | Facility: CLINIC | Age: 58
End: 2023-09-13
Payer: MEDICAID

## 2023-09-13 VITALS
SYSTOLIC BLOOD PRESSURE: 134 MMHG | HEIGHT: 66 IN | WEIGHT: 242 LBS | BODY MASS INDEX: 38.89 KG/M2 | DIASTOLIC BLOOD PRESSURE: 85 MMHG | HEART RATE: 67 BPM

## 2023-09-13 DIAGNOSIS — M48.07 SPINAL STENOSIS, LUMBOSACRAL REGION: ICD-10-CM

## 2023-09-13 PROCEDURE — 99213 OFFICE O/P EST LOW 20 MIN: CPT

## 2023-09-14 RX ORDER — TIRZEPATIDE 2.5 MG/.5ML
2.5 INJECTION, SOLUTION SUBCUTANEOUS
Qty: 3 | Refills: 3 | Status: DISCONTINUED | COMMUNITY
Start: 2023-06-19 | End: 2023-09-14

## 2023-10-10 ENCOUNTER — APPOINTMENT (OUTPATIENT)
Dept: OPHTHALMOLOGY | Facility: CLINIC | Age: 58
End: 2023-10-10
Payer: MEDICAID

## 2023-10-10 ENCOUNTER — NON-APPOINTMENT (OUTPATIENT)
Age: 58
End: 2023-10-10

## 2023-10-10 PROCEDURE — 92012 INTRM OPH EXAM EST PATIENT: CPT

## 2023-10-11 ENCOUNTER — APPOINTMENT (OUTPATIENT)
Dept: ORTHOPEDIC SURGERY | Facility: CLINIC | Age: 58
End: 2023-10-11
Payer: MEDICAID

## 2023-10-11 VITALS — WEIGHT: 240 LBS | BODY MASS INDEX: 38.57 KG/M2 | HEIGHT: 66 IN

## 2023-10-11 DIAGNOSIS — M51.36 OTHER INTERVERTEBRAL DISC DEGENERATION, LUMBAR REGION: ICD-10-CM

## 2023-10-11 DIAGNOSIS — M54.16 RADICULOPATHY, LUMBAR REGION: ICD-10-CM

## 2023-10-11 DIAGNOSIS — M51.26 OTHER INTERVERTEBRAL DISC DISPLACEMENT, LUMBAR REGION: ICD-10-CM

## 2023-10-11 PROCEDURE — 99214 OFFICE O/P EST MOD 30 MIN: CPT

## 2023-10-18 NOTE — ASSESSMENT
Problem: Occupational Therapy  Goal: Occupational Therapy Goal  Description: Goals to be met by: 11/16/23     Patient will increase functional independence with ADLs by performing:    UE Dressing with Minimal Assistance.  LE Dressing with Minimal Assistance.  Grooming while standing with Stokes.  Toileting from toilet with Minimal Assistance for hygiene and clothing management.   Bathing from  shower chair/bench with Minimal Assistance.    Outcome: Ongoing, Progressing      [FreeTextEntry1] : 53 year old female with history of uncontrolled DM Type 2, HLD, HTN who presented for follow up and FNA of left thyroid nodule. \par \par Uncontrolled DM2\par 4/2019 A1c 7.3%, reports PCP performed A1c after last visit which was 8.0%\par Will obtain A1c today \par Continue Metformin 1000 mg BID \par Continue Jardiance 10 mg daily, added at las visit for better glycemic control and weight loss \par Continue SMBGs 2-3 times per day\par Bariatric diet \par \par Thyroid nodule\par  2018 TSH 2.1\par Strong family history of thyroid cancer in sister and paternal aunt \par 10/2019 thyroid sono 1.27 x1.17 x 1.41cm solid left lower pole nodule \par will obtain TFT today \par s/p  FNA today and Affirma, will call with results \par \par HLD\par Continue Atorvastatin 40 mg daily \par \par \par HTN\par Continue Losartan, Nifedipine and Metoprolol\par \par \par RTC in 3 months.

## 2023-10-24 ENCOUNTER — NON-APPOINTMENT (OUTPATIENT)
Age: 58
End: 2023-10-24

## 2023-10-24 ENCOUNTER — APPOINTMENT (OUTPATIENT)
Dept: OPHTHALMOLOGY | Facility: CLINIC | Age: 58
End: 2023-10-24
Payer: MEDICAID

## 2023-10-24 PROCEDURE — 92012 INTRM OPH EXAM EST PATIENT: CPT

## 2023-11-06 ENCOUNTER — NON-APPOINTMENT (OUTPATIENT)
Age: 58
End: 2023-11-06

## 2023-11-07 RX ORDER — TIRZEPATIDE 5 MG/.5ML
5 INJECTION, SOLUTION SUBCUTANEOUS
Qty: 4 | Refills: 1 | Status: DISCONTINUED | COMMUNITY
Start: 2023-09-14 | End: 2023-11-07

## 2023-11-08 ENCOUNTER — APPOINTMENT (OUTPATIENT)
Dept: OPHTHALMOLOGY | Facility: CLINIC | Age: 58
End: 2023-11-08
Payer: MEDICAID

## 2023-11-08 ENCOUNTER — NON-APPOINTMENT (OUTPATIENT)
Age: 58
End: 2023-11-08

## 2023-11-08 PROCEDURE — 92083 EXTENDED VISUAL FIELD XM: CPT

## 2023-11-08 PROCEDURE — 92250 FUNDUS PHOTOGRAPHY W/I&R: CPT

## 2023-11-08 PROCEDURE — 92014 COMPRE OPH EXAM EST PT 1/>: CPT

## 2023-11-10 RX ORDER — FLASH GLUCOSE SENSOR
KIT MISCELLANEOUS
Qty: 6 | Refills: 3 | Status: ACTIVE | COMMUNITY
Start: 2023-04-14 | End: 1900-01-01

## 2023-11-10 RX ORDER — FLASH GLUCOSE SENSOR
KIT MISCELLANEOUS
Qty: 2 | Refills: 5 | Status: ACTIVE | COMMUNITY
Start: 2022-09-28 | End: 1900-01-01

## 2023-11-16 ENCOUNTER — APPOINTMENT (OUTPATIENT)
Dept: NEUROLOGY | Facility: CLINIC | Age: 58
End: 2023-11-16
Payer: MEDICAID

## 2023-11-16 VITALS
WEIGHT: 223 LBS | BODY MASS INDEX: 35.84 KG/M2 | HEIGHT: 66 IN | HEART RATE: 81 BPM | DIASTOLIC BLOOD PRESSURE: 86 MMHG | SYSTOLIC BLOOD PRESSURE: 136 MMHG

## 2023-11-16 DIAGNOSIS — I10 ESSENTIAL (PRIMARY) HYPERTENSION: ICD-10-CM

## 2023-11-16 DIAGNOSIS — M54.12 RADICULOPATHY, CERVICAL REGION: ICD-10-CM

## 2023-11-16 DIAGNOSIS — I67.2 CEREBRAL ATHEROSCLEROSIS: ICD-10-CM

## 2023-11-16 DIAGNOSIS — I63.9 CEREBRAL INFARCTION, UNSPECIFIED: ICD-10-CM

## 2023-11-16 PROCEDURE — 99215 OFFICE O/P EST HI 40 MIN: CPT

## 2023-11-28 ENCOUNTER — APPOINTMENT (OUTPATIENT)
Dept: ENDOCRINOLOGY | Facility: CLINIC | Age: 58
End: 2023-11-28
Payer: MEDICAID

## 2023-11-28 VITALS
HEIGHT: 66 IN | SYSTOLIC BLOOD PRESSURE: 150 MMHG | BODY MASS INDEX: 35.68 KG/M2 | WEIGHT: 222 LBS | DIASTOLIC BLOOD PRESSURE: 98 MMHG | OXYGEN SATURATION: 97 % | HEART RATE: 84 BPM

## 2023-11-28 DIAGNOSIS — E11.9 TYPE 2 DIABETES MELLITUS W/OUT COMPLICATIONS: ICD-10-CM

## 2023-11-28 DIAGNOSIS — I10 ESSENTIAL (PRIMARY) HYPERTENSION: ICD-10-CM

## 2023-11-28 DIAGNOSIS — E78.00 PURE HYPERCHOLESTEROLEMIA, UNSPECIFIED: ICD-10-CM

## 2023-11-28 PROCEDURE — 99214 OFFICE O/P EST MOD 30 MIN: CPT | Mod: 25

## 2023-11-28 PROCEDURE — 95251 CONT GLUC MNTR ANALYSIS I&R: CPT

## 2023-11-28 RX ORDER — LANCING DEVICE
EACH MISCELLANEOUS 4 TIMES DAILY
Qty: 2 | Refills: 10 | Status: ACTIVE | COMMUNITY
Start: 2022-09-28 | End: 1900-01-01

## 2023-12-04 LAB — HBA1C MFR BLD HPLC: 6.5

## 2023-12-28 ENCOUNTER — APPOINTMENT (OUTPATIENT)
Dept: NEUROLOGY | Facility: CLINIC | Age: 58
End: 2023-12-28
Payer: MEDICAID

## 2023-12-28 PROCEDURE — 93888 INTRACRANIAL LIMITED STUDY: CPT

## 2024-01-24 ENCOUNTER — APPOINTMENT (OUTPATIENT)
Dept: OPHTHALMOLOGY | Facility: CLINIC | Age: 59
End: 2024-01-24
Payer: MEDICAID

## 2024-01-24 ENCOUNTER — NON-APPOINTMENT (OUTPATIENT)
Age: 59
End: 2024-01-24

## 2024-01-24 PROCEDURE — 99214 OFFICE O/P EST MOD 30 MIN: CPT

## 2024-01-24 PROCEDURE — 92136 OPHTHALMIC BIOMETRY: CPT

## 2024-02-08 ENCOUNTER — APPOINTMENT (OUTPATIENT)
Dept: NEUROLOGY | Facility: CLINIC | Age: 59
End: 2024-02-08
Payer: MEDICAID

## 2024-02-08 PROCEDURE — 93880 EXTRACRANIAL BILAT STUDY: CPT

## 2024-03-20 ENCOUNTER — NON-APPOINTMENT (OUTPATIENT)
Age: 59
End: 2024-03-20

## 2024-03-20 ENCOUNTER — APPOINTMENT (OUTPATIENT)
Dept: OPHTHALMOLOGY | Facility: CLINIC | Age: 59
End: 2024-03-20
Payer: MEDICAID

## 2024-03-20 PROCEDURE — 92250 FUNDUS PHOTOGRAPHY W/I&R: CPT

## 2024-03-20 PROCEDURE — 92012 INTRM OPH EXAM EST PATIENT: CPT

## 2024-05-03 ENCOUNTER — NON-APPOINTMENT (OUTPATIENT)
Age: 59
End: 2024-05-03

## 2024-05-08 ENCOUNTER — APPOINTMENT (OUTPATIENT)
Dept: ENDOCRINOLOGY | Facility: CLINIC | Age: 59
End: 2024-05-08

## 2024-05-15 ENCOUNTER — APPOINTMENT (OUTPATIENT)
Dept: NEUROLOGY | Facility: CLINIC | Age: 59
End: 2024-05-15
Payer: MEDICAID

## 2024-05-15 VITALS
SYSTOLIC BLOOD PRESSURE: 133 MMHG | HEIGHT: 66 IN | DIASTOLIC BLOOD PRESSURE: 79 MMHG | BODY MASS INDEX: 34.87 KG/M2 | WEIGHT: 217 LBS | HEART RATE: 64 BPM

## 2024-05-15 PROCEDURE — 99215 OFFICE O/P EST HI 40 MIN: CPT

## 2024-05-15 PROCEDURE — G2211 COMPLEX E/M VISIT ADD ON: CPT | Mod: NC,1L

## 2024-05-15 PROCEDURE — G2212 PROLONG OUTPT/OFFICE VIS: CPT

## 2024-05-15 RX ORDER — ERGOCALCIFEROL 1.25 MG/1
50000 CAPSULE ORAL
Refills: 0 | Status: ACTIVE | COMMUNITY
Start: 2024-05-15

## 2024-05-15 NOTE — HISTORY OF PRESENT ILLNESS
[FreeTextEntry1] : Ms. Kc 58 year old right handed  woman with vascular risk factors of HTN, DM II, HPLD and age is seen in the vascular neurology office for the evaluation and management of stroke, leading to hospital admission in 10/17. 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   Assessment: 54 year old right-handed woman with multiple vascular risk factors including age, HTN, DM II and HPLD is seen in the vascular neurology office for evaluation and management of stroke to hospital admission in 10/17. She was reported to have acute onset of left facial droop, dysarthria and troubled with a sense of directions in 10/17, prompting her presentation to [facility] for further evaluation. CT brain (10/17) on admission showed early changes of ischemia in the right MCA distribution. MRI brain (10/17) showed right MCA distribution embolic-looking infarct without significant hemorrhagic transformation. CTA head and neck (10/17) is concerning for possible partial recanalization of an embolus involving distal MCA M1 bifurcation/proximal M2s vs intracranial atherosclerosis to my eye but did not show any other significant intracranial or extracranial cerebral large vessel severe stenosis or occlusion. Transesophageal echocardiogram did not show any obvious structural cardiac source of embolism nor showed any evidence of a PFO. Hypercoagulable workup is unrevealing and no evidence of primary hypercoagulable state as the evaluation by hematologist. She is currently undergoing prolonged cardiac monitoring with ICM. Sleep study did not show any evidence of obstructive sleep apnea. MRA head (1/9/18) is reported to show "high-grade stenosis at right MCA M1 bifurcation/proximal M2 segments, and moderate stenosis of the basilar artery". MRA head and neck with MRA NOVA confirmed right MCA distal M1 severe to critical stenosis.  She is diagnosed not to respond to clopidogrel based on P2Y12 testing and she was advised to increased the dose of clopidogrel but has not been able to undergo follow up blood testing due to personal reasons.  She underwent gastric sleeve and hiatus hernia surgery and tolerated the procedure well.  She presented to [facility] ED for left arm sharp pain without any weakness or sensory paresthesia lasting for about 2 days and improved with "pain medications". Currently she denies any focal neurological symptoms and reports to have current post-stroke mRS being 0. Denies any episodes of new or recurrent focal neurological symptoms concerning for stroke or TIA since her last office visit. Reports compliance with her medications and denies any significant side effects.  As per the verbal report, she was evaluated by sleep disorder specialist and she reports that she is not diagnosed to have EBEN.    7/16/2020 visit with INA Chen On 2/24/20 she underwent a right superficial temporal artery- middle cerebral artery frontal branch direct microanastomosis for M1 stenosis with Dr. Faria. No new neurological complaints, no residual symptoms from the previous right caudate and right MCA infarct. Since last visit she states she has some BL LE swelling later in the day but denies any color, temperature changes, SOB. She states she is staying well hydrated but notices she is not urinating more.   5/10/21  visit with INA Chen She reports for the last few months she has been experiencing Intermittent LUE and facial numbness and tingling. She also reports that at times she has L sided drooling. She denies any other neurological concerns. She is compliant with her medications and has not followed up with Dr. Faria in over a year. She also complains of short term memory concerns.   11/10/21 visit with Dr. Roque Power She reports continuing to experience intermittent LUE and facial numbness and tingling. She still experiences the L sided drooling. She followed up with Dr. Mason in June, MRA NOVA demonstrated good intracranial flow through the bypass. She saw Dr. Rizvi, he removed the ILR, which had been in for 3 years and had not detected any episodes of PAF. Had neuropsych evaluation completed which demonstrated mild vascular neurocognitive disorder. She reports feeling well and denies any interval Stroke or TIA symptoms. MRI/MRA head demonstrated Old right basal ganglia ischemic change with ex vacuo dilatation of the right lateral ventricle. Right STA to MCA bypass graft is patent with flow identified in the distal right middle cerebral artery territory.  5/11/22 visit with INA Chen Today she reports feeling well. She is scheduled to have spinal injection on 5/30 with pain management. She is compliant with meds.   11/16/23 visit with Dr. Roque Power  5/15/2024.  She came to the office today.  She notes occasional right-sided headaches, particularly when she is lying in bed on her right side, with pressure on the right side of her head.  No new focal neurologic symptoms.

## 2024-05-15 NOTE — DISCUSSION/SUMMARY
[Antithrombotic therapy with ___] : antithrombotic therapy with  [unfilled] [Intensive Blood Pressure Control] : intensive blood pressure control [Lipid Lowering Therapy] : lipid lowering therapy [Patient encouraged to discuss with Primary MD] : I encouraged the patient to discuss these important issues with ~his/her~ primary care doctor [Goals and Counseling] : I have reviewed the goals of stroke risk factor modification. I counseled the patient on measures to reduce stroke risk, including the importance of medication compliance, risk factor control, exercise, healthy diet and avoidance of smoking. I reviewed stroke warning signs and symptoms and appropriate actions to take if such occur. [FreeTextEntry1] : Summary from previous visits with NP Ana Chen and Dr. Roque Power, most recent visit was 11/16/23: Impression: Cerebral embolism with cerebral infarction. Right MCA distribution embolic - stroke - likely etiology symptomatic intracranial atherosclerosis, leading to artery to artery embolism  Clopidogrel non-responder; s/p Bypass 2021 - Right STA to MCA bypass graft is patent with flow identified in the distal right middle cerebral artery territory.  HAs Back Pain - herniated disc, seen ortho- cant be off Plavix for too long ; needs to be off 7 days for steroid injection.   Plan: Continue on Clopidogrel 150 mg once a day for secondary prevention, advised patient to let us know if she experiences easy bleeding or bruising.  Patient does not need to follow up with Dr. Faria, as MRA Brain NOVA demonstrated good intracranial flow through the bypass (according to 6/2021 note).  Continue Atorvastatin 80 mg at bedtime considering likely atheroembolic etiology of her stroke and age.  Continue to follow up with PCP/cardiology for BP and statin management (goal LDL <70) as well as all routine primary care needs. Screened patient for sleep apnea with no identifiable risk factors at this time. Will reevaluate next visit. We discussed aggressive vascular strict risk factor control.   Follow up with us in 6 months or sooner if needed. TCD and Doppler - < 40 % Carotid stenosis  Advised to follow up with PCP regarding incidental thyroid nodules.  Patient and family were educated on signs and symptoms of stroke and to contact 911 immediately if experiencing any.  5/15/2024.  Overall she is neurologically stable.  Her neurologic exam shows moderate left ptosis which is postsurgical; a probable mild left hemiparesis and mild left-sided sensory loss attributable to her previous right hemispheric infarct; right deltoid weakness which may be due to rotator cuff injury.  She has occasional headaches or right-sided head tenderness in the region of her previous bypass surgery.  I reassured her that this is a common sequela of neurosurgery, and does not suggest recurrent stroke or any dangerous condition.  She will benefit from ongoing aggressive management of her vascular risk factors.  Target LDL should be <55.  She can follow-up in roughly 6 months with Dopplers.  I hope that she remains free of further serious trouble.   Consult note sent to:  PCP: Blake Ritchie (P) [phone]

## 2024-05-15 NOTE — CONSULT LETTER
[Dear  ___] : Dear  [unfilled], [Consult Letter:] : I had the pleasure of evaluating your patient, [unfilled]. [Please see my note below.] : Please see my note below. [Consult Closing:] : Thank you very much for allowing me to participate in the care of this patient.  If you have any questions, please do not hesitate to contact me. [Sincerely,] : Sincerely, [FreeTextEntry2] : Blake Ritchie MD [FreeTextEntry3] : Richard B. Libman, MD, FRCPC  , Neurology  Co-Director, Stroke Center Professor of Neurology [facility] [employer] [facility]

## 2024-05-15 NOTE — PHYSICAL EXAM
[FreeTextEntry1] : Generally looked well. Mental status exam: Alert, oriented x3, speech fluent/prosodic without paraphasias; comprehension intact; memory and fund of knowledge intact. On cranial nerve exam, I was unable to see the fundi; slight flattening of left nasolabial fold; the remainder of cranial nerves II through XII was intact. On motor exam tone was normal. There was a left leg drift. Fine finger movements are mildly slow bilaterally.  Right deltoid-4 -/5 and otherwise power on the right was normal; left iliopsoas 4/5 and otherwise power was normal throughout. Reflexes were 1+ in the arms, trace at the knees, absent at the right Achilles and 1+ at the left Achilles; plantar reflexes were downgoing on the right and silent on the left.  Coordination and gait were normal. Tandem gait was normal. Romberg test was negative.  On sensory exam there was diminished vibratory sense in the left leg at the ankle; other sensory modalities were intact..     [General Appearance - Alert] : alert [General Appearance - In No Acute Distress] : in no acute distress [Oriented To Time, Place, And Person] : oriented to person, place, and time [Impaired Insight] : insight and judgment were intact [Affect] : the affect was normal [Sclera] : the sclera and conjunctiva were normal [PERRL With Normal Accommodation] : pupils were equal in size, round, reactive to light, with normal accommodation [Extraocular Movements] : extraocular movements were intact [Outer Ear] : the ears and nose were normal in appearance [Oropharynx] : the oropharynx was normal [Neck Appearance] : the appearance of the neck was normal [Neck Cervical Mass (___cm)] : no neck mass was observed [Jugular Venous Distention Increased] : there was no jugular-venous distention [Thyroid Diffuse Enlargement] : the thyroid was not enlarged [Thyroid Nodule] : there were no palpable thyroid nodules [Auscultation Breath Sounds / Voice Sounds] : lungs were clear to auscultation bilaterally [Heart Rate And Rhythm] : heart rate was normal and rhythm regular [Heart Sounds] : normal S1 and S2 [Heart Sounds Gallop] : no gallops [Murmurs] : no murmurs [Heart Sounds Pericardial Friction Rub] : no pericardial rub [Edema] : there was no peripheral edema [Arterial Pulses Carotid] : carotid pulses were normal with no bruits [Veins - Varicosity Changes] : there were no varicosital changes [No CVA Tenderness] : no ~M costovertebral angle tenderness [No Spinal Tenderness] : no spinal tenderness [Abnormal Walk] : normal gait [Nail Clubbing] : no clubbing  or cyanosis of the fingernails [Musculoskeletal - Swelling] : no joint swelling seen [Motor Tone] : muscle strength and tone were normal [Skin Color & Pigmentation] : normal skin color and pigmentation [Skin Turgor] : normal skin turgor [] : no rash

## 2024-06-07 RX ORDER — TIRZEPATIDE 7.5 MG/.5ML
7.5 INJECTION, SOLUTION SUBCUTANEOUS
Qty: 4 | Refills: 2 | Status: ACTIVE | COMMUNITY
Start: 2023-11-07

## 2024-06-10 ENCOUNTER — APPOINTMENT (OUTPATIENT)
Dept: NEUROLOGY | Facility: CLINIC | Age: 59
End: 2024-06-10

## 2024-06-18 ENCOUNTER — RX RENEWAL (OUTPATIENT)
Age: 59
End: 2024-06-18

## 2024-06-18 RX ORDER — CLOPIDOGREL BISULFATE 75 MG/1
75 TABLET, FILM COATED ORAL
Qty: 180 | Refills: 0 | Status: ACTIVE | COMMUNITY
Start: 2018-02-06 | End: 1900-01-01

## 2024-06-27 NOTE — ASSESSMENT
Patient: Snehal Ball    Procedure Summary       Date: 24 Room / Location: Virtual [facility]    Anesthesia Start:  Anesthesia Stop:     Procedure: OBGYN Delivery  Section Diagnosis: (Breech)    Surgeons: Elis Helton MD Responsible Provider: PIPER Mercedes    Anesthesia Type: spinal ASA Status: 2            Anesthesia Type: spinal    Vitals Value Taken Time   /65 24   Temp 36.8 °C (98.2 °F) 24   Pulse 60 24   Resp 16 24   SpO2 98 % 24       Anesthesia Post Evaluation    Patient location during evaluation: bedside  Patient participation: complete - patient participated  Level of consciousness: awake and alert  Pain score: 2  Pain management: adequate  Multimodal analgesia pain management approach  Airway patency: patent  Cardiovascular status: acceptable  Respiratory status: acceptable  Hydration status: acceptable  Postoperative Nausea and Vomiting: mild  Comments: No redness, swelling, or drainage at puncture site.    Complete resolution of numbness. Patient is able to lift legs and bend at the knees.    Patient denies headache or severe back pain.     There were no known notable events for this encounter.     [FreeTextEntry1] : 53 year old female with history of uncontrolled DM Type 2, HLD, HTN who presented for follow up and FNA of left thyroid nodule. \par \par Uncontrolled DM2:\par -Most recent HgA1C is 8.9 %  \par -Continue Metformin 1000 mg BID \par -Will consider starting her on Actos 15 mg daily \par -Previously with Jardiance had a yeast infection and reported nausea with glimeperide \par -Continue SMBGs 2-3 times per day\par -She will be getting a cortisone injection later this week and advised to that she will likely experience hyperglycemia for 4-5 days post but later there will be an improvement \par \par Thyroid nodule:\par 12/2019: Benign FNA \par -Strong family history of thyroid cancer in sister and paternal aunt \par -10/2019 thyroid sonogram:  1.27 x1.17 x 1.41 cm solid left lower pole nodule \par -Will schedule for thyroid ultrasound \par \par \par HLD\par -Continue Atorvastatin 40 mg daily \par \par \par HTN\par -Continue Losartan, Nifedipine and Metoprolol\par \par \par RTC in 3-4 months. \par

## 2024-08-08 ENCOUNTER — NON-APPOINTMENT (OUTPATIENT)
Age: 59
End: 2024-08-08

## 2024-08-09 ENCOUNTER — NON-APPOINTMENT (OUTPATIENT)
Age: 59
End: 2024-08-09

## 2024-08-20 ENCOUNTER — APPOINTMENT (OUTPATIENT)
Dept: OPHTHALMOLOGY | Facility: AMBULATORY SURGERY CENTER | Age: 59
End: 2024-08-20
Payer: MEDICAID

## 2024-08-20 PROCEDURE — 66984 XCAPSL CTRC RMVL W/O ECP: CPT | Mod: LT

## 2024-08-21 ENCOUNTER — NON-APPOINTMENT (OUTPATIENT)
Age: 59
End: 2024-08-21

## 2024-08-21 ENCOUNTER — APPOINTMENT (OUTPATIENT)
Dept: OPHTHALMOLOGY | Facility: CLINIC | Age: 59
End: 2024-08-21
Payer: MEDICAID

## 2024-08-21 PROCEDURE — 99024 POSTOP FOLLOW-UP VISIT: CPT

## 2024-08-21 NOTE — H&P ADULT. - INFLUENZA IMMUNIZATION DATE:
[facility]  Hospitalist Discharge Summary      Date of Admission:  8/17/2024  Date of Discharge:  8/21/2024 11:50 AM  Discharging Provider: Ramirez Campos MD  Discharge Service: Hospitalist Service    Discharge Diagnoses   LUE pain due to left biceps tendinopathy with possible left long head of the biceps tendon tear vs strain   -Evaluated by orthopedic surgery this admission and recommended nonoperative management    Generalized weakness and deconditioning  -Patient being discharged to TCU    Past medical history:  Coronary artery disease  Chronic kidney disease stage II  Carotid artery stenosis  Hypothyroidism  GERD  Diabetes mellitus  Depression  Hyperlipidemia  Obstructive sleep apnea  Pulmonary embolism      Clinically Significant Risk Factors     # DMII: A1C = 7.3 % (Ref range: 0.0 - 5.6 %) within past 6 months  # Severe Obesity: Estimated body mass index is 40.21 kg/m  as calculated from the following:    Height as of this encounter: 1.524 m (5').    Weight as of this encounter: 93.4 kg (205 lb 14.6 oz).       Follow-ups Needed After Discharge   Follow-up Appointments     Follow Up and recommended labs and tests      Follow up with Dr. Angelita Solomon at [facility] in 4 weeks.    Please call [phone] to schedule    See your primary care provider in 1 week after discharge from TCU to   follow up on blood pressure and diabetic management            Unresulted Labs Ordered in the Past 30 Days of this Admission       No orders found from 7/18/2024 to 8/18/2024.            Discharge Disposition   Discharged to short-term care facility  Condition at discharge: Stable    Hospital Course    77 year old female with a history of coronary artery disease, stage II chronic kidney disease, carotid artery stenosis, hypothyroidism, GERD, diabetes mellitus, depression, hyperlipidemia, ANNETTE, and PE who presents with left arm pain after twisting and putting on a blood pressure cuff      The patient was admitted to the hospital service.  MRI of the left upper extremity demonstrated findings that appeared most consistent with a left biceps tendinopathy.  Orthopedic surgery was consulted who felt that the patient's exam was consistent with left long head of the biceps tendon tear versus strain.  Nonoperative, conservative management was recommended by orthopedic surgery.  On discharge the patient was recommended to follow-up with orthopedic surgery in 4 weeks.  She was provided a phone number to arrange this appointment.      Patient was seen by rehab therapy this admission and recommended TCU.  Patient was discharged to TCU ([facility]) on 8/21/2024    Consultations This Hospital Stay   CARE MANAGEMENT / SOCIAL WORK IP CONSULT  PHYSICAL THERAPY ADULT IP CONSULT  ORTHOPEDIC SURGERY IP CONSULT  NURSING TO CONSULT FOR VASCULAR ACCESS CARE IP CONSULT  PHYSICAL THERAPY ADULT IP CONSULT  VASCULAR ACCESS ADULT IP CONSULT  VASCULAR ACCESS ADULT IP CONSULT  OCCUPATIONAL THERAPY ADULT IP CONSULT  PHYSICAL THERAPY ADULT IP CONSULT  OCCUPATIONAL THERAPY ADULT IP CONSULT    Code Status   Full Code    Time Spent on this Encounter   I, Ramirez Campos MD, personally saw the patient today and spent less than or equal to 30 minutes discharging this patient.       Ramirez Campos MD  [facility]  [address] [place] [address]  Phone: [phone]  Fax: [phone]  ______________________________________________________________________    Physical Exam   Vital Signs: Temp: 97.8  F (36.6  C) Temp src: Temporal BP: (!) 180/60 (patient was just up in the bathroom and verbalized anxiety about d.c) Pulse: 63   Resp: 16 SpO2: 99 % O2 Device: None (Room air)    Weight: 205 lbs 14.55 oz  Awake, alert, oriented x 3  Cardiovascular exam: Regular rate rhythm  Lung exam: Clear to auscultation bilaterally  Abdominal exam: Nontender/nondistended  Left upper  extremity exam: Left upper extremity in sling.  Left hand and fingers warm to touch with normal  strength.  Left hand sensation intact.       Primary Care Physician   Shola Benoit MD    Discharge Orders      Primary Care - Care Coordination Referral      General info for SNF    Length of Stay Estimate: Short Term Care: Estimated # of Days <30  Condition at Discharge: Improving  Level of care:skilled   Rehabilitation Potential: Good  Admission H&P remains valid and up-to-date: Yes  Recent Chemotherapy: N/A  Use Nursing Home Standing Orders: Yes     Mantoux instructions    Give two-step Mantoux (PPD) Per Facility Policy Yes     Follow Up and recommended labs and tests    Follow up with Dr. Angelita Solomon at [facility] in 4 weeks.  Please call [phone] to schedule    See your primary care provider in 1 week after discharge from TCU to follow up on blood pressure and diabetic management     Reason for your hospital stay    Left arm pain due to biceps tendinopathy (biceps tendon irritation/inflammation)     Glucose monitor nursing POCT    Before meals and at bedtime     Activity - Up with nursing assistance     Physical Therapy Adult Consult    Evaluate and treat as clinically indicated.    Reason:  LUE pain/weakness due to biceps tendinopathy     Occupational Therapy Adult Consult    Evaluate and treat as clinically indicated.    Reason:   LUE pain/weakness due to biceps tendinopathy     Diet    Follow this diet upon discharge: diabetic diet       Significant Results and Procedures   Results for orders placed or performed during the hospital encounter of 08/17/24   XR Shoulder Left G/E 3 Views    Narrative    EXAM: XR SHOULDER LEFT G/E 3 VIEWS  LOCATION: [facility]  DATE: 8/17/2024    INDICATION: left shoulder pain  COMPARISON: 8/31/2023      Impression    IMPRESSION: Left shoulder arthroplasty. No hardware complication. No acute fracture or dislocation.   Humerus  XR,  G/E 2 views, left    Narrative    EXAM: XR HUMERUS LEFT G/E 2 VIEWS  LOCATION: [facility]  DATE: 8/17/2024    INDICATION: Left humerus pain.  COMPARISON: None.      Impression    IMPRESSION: Left shoulder arthroplasty. No visible fracture or dislocation.   US Upper Extremity Venous Duplex Left    Narrative    EXAM: US UPPER EXTREMITY VENOUS DUPLEX LEFT  LOCATION: [facility]  DATE: 8/17/2024    INDICATION: Left arm pain, DVT eval  COMPARISON: None.  TECHNIQUE: Venous Duplex ultrasound of the left upper extremity with (when possible) and without compression, augmentation, and duplex. Color flow and spectral Doppler with waveform analysis performed.    FINDINGS: Ultrasound includes evaluation of the internal jugular vein, innominate vein, subclavian vein, axillary vein, and brachial vein. The superficial cephalic and basilic veins were also evaluated where seen.     LEFT: No deep venous thrombosis. No superficial thrombophlebitis.       Impression    IMPRESSION:   1.  No deep venous thrombosis in the left upper extremity.   MR Elbow Left w/o Contrast    Narrative    EXAM: MR ELBOW LEFT W/O CONTRAST  LOCATION: [facility]  DATE: 8/18/2024    INDICATION: Left arm pain after injury; clinical suspicion of biceps tendon tear.  COMPARISON: Left humerus radiographs 8/17/2024.  TECHNIQUE: Unenhanced.    FINDINGS:     TENDONS:   -Distal biceps: Minimal insertional tendinopathy, without evidence of tear.  -Common extensor origin: No tendinopathy or tear.   -Common flexor tendon origin: No tendinopathy or tear.  -Distal triceps: Normal.     LIGAMENTS:   -Ulnar collateral ligament: Intact.   -Lateral collateral ligament complex: Lateral ulnar collateral ligament and radial collateral ligament are intact.     ELBOW JOINT:  -Ulnotrochlear articulation: Minimal chondrosis.   -Radiocapitellar articulation: Normal.   -Joint space: No effusion or loose bodies.      BONES:   -No fracture or bone contusion.    SOFT TISSUES:   -No muscle atrophy or edema. Cubital tunnel is normal and visualized ulnar nerve is intact. No bursitis.      Impression    IMPRESSION:  1.  Minimal insertional left biceps tendinopathy, without evidence of tear.  2.  Minimal left elbow chondrosis.       *Note: Due to a large number of results and/or encounters for the requested time period, some results have not been displayed. A complete set of results can be found in Results Review.       Discharge Medications   Discharge Medication List as of 8/21/2024 11:33 AM        START taking these medications    Details   oxyCODONE (ROXICODONE) 5 MG tablet Take 1 tablet (5 mg) by mouth every 4 hours as needed for severe pain (IF pain not managed with non-pharmacological and non-opioid interventions)., Disp-10 tablet, R-0, Transitional      polyethylene glycol (MIRALAX) 17 g packet Take 17 g by mouth 2 times daily as needed for constipation., Transitional      senna-docusate (SENOKOT-S/PERICOLACE) 8.6-50 MG tablet Take 1 tablet by mouth daily., Transitional           CONTINUE these medications which have CHANGED    Details   famotidine (PEPCID) 20 MG tablet Take 1 tablet (20 mg) by mouth every evening., Transitional      insulin aspart (NOVOLOG PEN) 100 UNIT/ML pen Correction Scale - MEDIUM INSULIN RESISTANCE DOSING     Do Not give Correction Insulin if BG < 140.  For  - 189 give 1 unit.  For  - 239 give 2 units.  For  - 289 give 3 units.  For  - 339 give 4 units.  For BG = or > 340 give 5 u nits.  Check blood glucose before meals/bolus feedings (or q4h if NPO) and administer based on blood glucose.  Notify MD if glucose > or = 350 mg/dL after administration of correction dose., Transitional      INSULIN GLARGINE 100 UNIT/ML pen Inject 5 Units subcutaneously daily., ALEXIA, TransitionalIf Lantus is not covered by insurance, may substitute Basaglar or Semglee or other insulin glargine product  per insurance preference at same dose and frequency.        methocarbamol (ROBAXIN) 500 MG tablet Take 1-2 tablets (500-1,000 mg) by mouth 4 times daily as needed for muscle spasms., Disp-10 tablet, R-0, Local Print           CONTINUE these medications which have NOT CHANGED    Details   acetaminophen (TYLENOL) 500 MG tablet Take 1,000 mg by mouth every 8 hours as needed, Historical      amLODIPine (NORVASC) 2.5 MG tablet Take 1 tablet (2.5 mg) by mouth daily as needed For blood pressure > 140, Disp-90 tablet, R-3, E-Prescribe      amoxicillin (AMOXIL) 500 MG capsule Takes 4 caps before every dental appointments., Historical      aspirin (ASA) 81 MG EC tablet Take 81 mg by mouth daily (with lunch), Historical      BIOTIN PO Take 1 capsule by mouth at bedtime Unknown dose, Historical      calcium citrate 250 MG TABS Take 1 tablet by mouth at bedtime, Historical      carvedilol (COREG) 12.5 MG tablet Take 1 tablet (12.5 mg) by mouth 2 times daily (with meals), Disp-180 tablet, R-2, E-Prescribe      clotrimazole (LOTRIMIN) 1 % cream Apply topically as needed (rash/yeast infection)Historical      Continuous Glucose Sensor (FREESTYLE BRANDY 3 SENSOR) MISC 1 each every 14 days Change every 14 days, Disp-6 each, R-3, E-Prescribe      cyanocobalamin (VITAMIN B-12) 1000 MCG tablet Take 1,000 mcg by mouth every evening, Historical      empagliflozin (JARDIANCE) 10 MG TABS tablet Take 1 tablet (10 mg) by mouth every evening, Disp-90 tablet, R-3, E-Prescribe      ezetimibe (ZETIA) 10 MG tablet Take 1 tablet (10 mg) by mouth every evening, Disp-90 tablet, R-3, E-Prescribe      fenofibrate (TRICOR) 145 MG tablet Take 145 mg by mouth at bedtime, Historical      fexofenadine (ALLEGRA) 180 MG tablet Take 180 mg by mouth daily (with lunch) Takes in the afternoon, Disp-120, R-0, Historical      icosapent ethyl (VASCEPA) 1 g CAPS capsule Take 2 g by mouth every evening, Historical      ipratropium (ATROVENT) 0.03 % nasal spray Spray 1  spray in nostril every 8 hours as needed, Historical      ketoconazole (NIZORAL) 2 % external cream Apply topically 2 times daily as neededHistorical      levothyroxine (SYNTHROID/LEVOTHROID) 112 MCG tablet Take 1 tablet (112 mcg) by mouth daily, Disp-90 tablet, R-1, E-Prescribe      Lidocaine (LIDOCARE) 4 % Patch Place 1 patch onto the skin as needed To prevent lidocaine toxicity, patient should be patch free for 12 hrs daily.Historical      minoxidil (LONITEN) 2.5 MG tablet Take 0.25 tablets by mouth twice a week On Monday and Thursday, Historical      Multiple vitamin TABS Take 1 tablet by mouth daily, Historical      nitroGLYcerin (NITROSTAT) 0.4 MG sublingual tablet Place 1 tablet (0.4 mg) under the tongue every 5 minutes as needed for chest pain (no more than 3 in one hour; after 3rd, call 911.), Disp-25 tablet, R-3, E-Prescribe      ondansetron (ZOFRAN) 4 MG tablet Take 1 tablet (4 mg) by mouth every 6 hours as needed Reported on 3/20/2017, Disp-20 tablet, R-0, E-Prescribe      pantoprazole (PROTONIX) 40 MG EC tablet Take 40 mg by mouth 2 times daily, Historical      Polyethylene Glycol 400 (BLINK TEARS) 0.25 % GEL Place 1 drop into both eyes at bedtime, Historical      sucralfate (CARAFATE) 1 GM/10ML suspension Take 1 g by mouth 4 times daily as needed (GERD), Historical      tretinoin (RETIN-A) 0.025 % external cream Apply topically nightly as needed (facial lesions) Use every night as tolerated - spot treat lesionHistorical      triamcinolone (KENALOG) 0.025 % external ointment Apply topically 2 times daily as needed for irritationHistorical      Vitamin D3 50 mcg (2000 units) tablet Take 1 tablet by mouth every evening Takes in the afternoon, Historical           STOP taking these medications       fluocinolone acetonide (DERMA SMOOTHE/FS BODY) 0.01 % external oil Comments:   Reason for Stopping:         furosemide (LASIX) 20 MG tablet Comments:   Reason for Stopping:         olmesartan (BENICAR) 40 MG  tablet Comments:   Reason for Stopping:         pregabalin (LYRICA) 25 MG capsule Comments:   Reason for Stopping:             Allergies   Allergies   Allergen Reactions    Ketorolac Tromethamine Difficulty breathing     Shortness of breath to tablets only per the patient    Nsaids Difficulty breathing     Increased creatinine    Celecoxib Itching and Rash    Morphine And Codeine Itching     With higher doses. Pt denies this allergy 11/16/2023    Codeine Itching    Conjugated Estrogens     Crestor [Rosuvastatin] Muscle Pain (Myalgia)    No Clinical Screening - See Comments Itching     Fragrance    Vioxx Other (See Comments)     Heart races    Sulfa Antibiotics Rash      2016

## 2024-08-28 ENCOUNTER — NON-APPOINTMENT (OUTPATIENT)
Age: 59
End: 2024-08-28

## 2024-08-28 ENCOUNTER — APPOINTMENT (OUTPATIENT)
Dept: OPHTHALMOLOGY | Facility: CLINIC | Age: 59
End: 2024-08-28
Payer: MEDICAID

## 2024-08-28 PROCEDURE — 99024 POSTOP FOLLOW-UP VISIT: CPT

## 2024-08-30 ENCOUNTER — NON-APPOINTMENT (OUTPATIENT)
Age: 59
End: 2024-08-30

## 2024-08-30 ENCOUNTER — APPOINTMENT (OUTPATIENT)
Dept: OPHTHALMOLOGY | Facility: CLINIC | Age: 59
End: 2024-08-30
Payer: MEDICAID

## 2024-08-30 PROCEDURE — 99024 POSTOP FOLLOW-UP VISIT: CPT

## 2024-09-03 ENCOUNTER — NON-APPOINTMENT (OUTPATIENT)
Age: 59
End: 2024-09-03

## 2024-09-03 ENCOUNTER — APPOINTMENT (OUTPATIENT)
Dept: OPHTHALMOLOGY | Facility: CLINIC | Age: 59
End: 2024-09-03
Payer: MEDICAID

## 2024-09-03 PROCEDURE — 99024 POSTOP FOLLOW-UP VISIT: CPT

## 2024-09-05 NOTE — DISCHARGE NOTE ADULT - HAS THE PATIENT RECEIVED THE INFLUENZA VACCINE DURING THIS VISIT
Hi Dr. Tirado Thank you for referring Prema Mike to the gyn onc HPV clinic for evaluation of AGC cervical cytology, hrHPV-. Given your biopsy findings, the AGC is likely due to inflammation, although I did sample the endocervical polyp to further rule-out any pre-malignant or malignant pathology. Although I anticipate this will also be benign, Prema strongly desires definitive hysterectomy due to adenomyosis and abnormal uterine bleeding, and we are scheduling a TLH, BS. I will update you with the pathology results. Please contact me if you have any questions or concerns.   -louis Angeles MD, MS, FACOG, FACS 9/5/2024 4:31 PM    No Yes

## 2024-09-11 ENCOUNTER — APPOINTMENT (OUTPATIENT)
Dept: OPHTHALMOLOGY | Facility: CLINIC | Age: 59
End: 2024-09-11
Payer: MEDICAID

## 2024-09-11 ENCOUNTER — NON-APPOINTMENT (OUTPATIENT)
Age: 59
End: 2024-09-11

## 2024-09-11 PROCEDURE — 99024 POSTOP FOLLOW-UP VISIT: CPT

## 2024-09-18 ENCOUNTER — APPOINTMENT (OUTPATIENT)
Dept: OPHTHALMOLOGY | Facility: CLINIC | Age: 59
End: 2024-09-18
Payer: MEDICAID

## 2024-09-18 ENCOUNTER — NON-APPOINTMENT (OUTPATIENT)
Age: 59
End: 2024-09-18

## 2024-09-18 PROCEDURE — 99024 POSTOP FOLLOW-UP VISIT: CPT

## 2024-09-30 ENCOUNTER — APPOINTMENT (OUTPATIENT)
Dept: NEUROLOGY | Facility: CLINIC | Age: 59
End: 2024-09-30

## 2024-10-01 ENCOUNTER — NON-APPOINTMENT (OUTPATIENT)
Age: 59
End: 2024-10-01

## 2024-10-01 ENCOUNTER — APPOINTMENT (OUTPATIENT)
Dept: OPHTHALMOLOGY | Facility: CLINIC | Age: 59
End: 2024-10-01
Payer: MEDICAID

## 2024-10-01 PROCEDURE — 99024 POSTOP FOLLOW-UP VISIT: CPT

## 2024-10-01 PROCEDURE — 92134 CPTRZ OPH DX IMG PST SGM RTA: CPT

## 2024-10-09 ENCOUNTER — APPOINTMENT (OUTPATIENT)
Dept: ENDOCRINOLOGY | Facility: CLINIC | Age: 59
End: 2024-10-09

## 2024-10-10 ENCOUNTER — NON-APPOINTMENT (OUTPATIENT)
Age: 59
End: 2024-10-10

## 2024-10-10 ENCOUNTER — APPOINTMENT (OUTPATIENT)
Dept: NEUROLOGY | Facility: CLINIC | Age: 59
End: 2024-10-10
Payer: MEDICAID

## 2024-10-10 VITALS
DIASTOLIC BLOOD PRESSURE: 95 MMHG | HEIGHT: 66 IN | SYSTOLIC BLOOD PRESSURE: 170 MMHG | HEART RATE: 63 BPM | WEIGHT: 213 LBS | BODY MASS INDEX: 34.23 KG/M2

## 2024-10-10 PROCEDURE — G2212 PROLONG OUTPT/OFFICE VIS: CPT

## 2024-10-10 PROCEDURE — 99215 OFFICE O/P EST HI 40 MIN: CPT

## 2024-10-10 PROCEDURE — G2211 COMPLEX E/M VISIT ADD ON: CPT | Mod: NC

## 2024-10-10 RX ORDER — MOXIFLOXACIN OPHTHALMIC 5 MG/ML
0.5 SOLUTION/ DROPS OPHTHALMIC
Refills: 0 | Status: ACTIVE | COMMUNITY

## 2024-10-24 ENCOUNTER — NON-APPOINTMENT (OUTPATIENT)
Age: 59
End: 2024-10-24

## 2024-10-24 ENCOUNTER — APPOINTMENT (OUTPATIENT)
Dept: OPHTHALMOLOGY | Facility: CLINIC | Age: 59
End: 2024-10-24
Payer: MEDICAID

## 2024-10-24 PROCEDURE — 92134 CPTRZ OPH DX IMG PST SGM RTA: CPT

## 2024-10-24 PROCEDURE — 99024 POSTOP FOLLOW-UP VISIT: CPT

## 2024-10-29 NOTE — H&P PST ADULT - RS GEN PE MLT RESP DETAILS PC
Home Care Instructions for Colonoscopy and/or Gastroscopy with Sedation    Diet:  - Resume your regular diet as tolerated unless otherwise instructed.  - Start with light meals to minimize bloating.  - Do not drink alcohol today.    Medication:  - If you have questions about resuming your normal medications, please contact your Primary Care Physician.    Activities:  - Take it easy today. Do not return to work today.  - Do not drive today.  - Do not operate any machinery today (including kitchen equipment).  - Do not make any critical decisions or sign any paperwork.  - Do not exercise today.    Colonoscopy:  - You may notice some rectal \"spotting\" (a little blood on the toilet tissue) for a day or two after the exam. This is normal.  - If you experience any rectal bleeding (not spotting), persistent tenderness or sharp severe abdominal pains, oral temperature over 100 degrees Fahrenheit, light-headedness or dizziness, or any other problems, contact your doctor.    Gastroscopy:  - You may have a sore throat for 2-3 days following the exam. This is normal. Gargling with warm salt water (1/2 tsp salt to 1 glass warm water) or using throat lozenges will help.  - If you experience any sharp pain in your neck, abdomen or chest, vomiting of blood, oral temperature over 100 degrees Fahrenheit, light-headedness or dizziness, or any other problems, contact your doctor.    **If unable to reach your doctor, please go to the [facility] Emergency Room**    - Your referring physician will receive a full report of your examination.  - If you do not hear from your doctor's office within two weeks of your biopsy, please call them for your results.    You may be able to see your laboratory results in [facility] between 4 and 7 business days.  In some cases, your physician may not have viewed the results before they are released to [facility].  If you have questions regarding your results contact the physician who ordered the  test/exam by phone or via [employer] by choosing \"Ask a Medical Question.\"   clear to auscultation bilaterally/good air movement/airway patent/respirations non-labored/breath sounds equal

## 2024-11-04 ENCOUNTER — NON-APPOINTMENT (OUTPATIENT)
Age: 59
End: 2024-11-04

## 2024-11-04 ENCOUNTER — APPOINTMENT (OUTPATIENT)
Dept: OPHTHALMOLOGY | Facility: CLINIC | Age: 59
End: 2024-11-04
Payer: MEDICAID

## 2024-11-04 PROCEDURE — 92014 COMPRE OPH EXAM EST PT 1/>: CPT | Mod: 24

## 2024-11-04 PROCEDURE — 92134 CPTRZ OPH DX IMG PST SGM RTA: CPT

## 2024-11-15 ENCOUNTER — APPOINTMENT (OUTPATIENT)
Dept: NEUROLOGY | Facility: CLINIC | Age: 59
End: 2024-11-15
Payer: MEDICAID

## 2024-11-15 VITALS
DIASTOLIC BLOOD PRESSURE: 85 MMHG | HEIGHT: 66 IN | HEART RATE: 65 BPM | WEIGHT: 207 LBS | SYSTOLIC BLOOD PRESSURE: 136 MMHG | BODY MASS INDEX: 33.27 KG/M2

## 2024-11-15 DIAGNOSIS — I63.411 CEREBRAL INFARCTION DUE TO EMBOLISM OF RIGHT MIDDLE CEREBRAL ARTERY: ICD-10-CM

## 2024-11-15 PROCEDURE — 99213 OFFICE O/P EST LOW 20 MIN: CPT

## 2024-11-15 PROCEDURE — 93880 EXTRACRANIAL BILAT STUDY: CPT

## 2024-11-15 PROCEDURE — G2211 COMPLEX E/M VISIT ADD ON: CPT | Mod: NC

## 2024-11-15 PROCEDURE — 93888 INTRACRANIAL LIMITED STUDY: CPT

## 2024-11-18 ENCOUNTER — APPOINTMENT (OUTPATIENT)
Dept: OPHTHALMOLOGY | Facility: CLINIC | Age: 59
End: 2024-11-18
Payer: MEDICAID

## 2024-11-18 ENCOUNTER — NON-APPOINTMENT (OUTPATIENT)
Age: 59
End: 2024-11-18

## 2024-11-18 PROCEDURE — 92134 CPTRZ OPH DX IMG PST SGM RTA: CPT

## 2024-11-18 PROCEDURE — 99024 POSTOP FOLLOW-UP VISIT: CPT

## 2024-11-26 ENCOUNTER — NON-APPOINTMENT (OUTPATIENT)
Age: 59
End: 2024-11-26

## 2024-11-26 ENCOUNTER — APPOINTMENT (OUTPATIENT)
Dept: OPHTHALMOLOGY | Facility: CLINIC | Age: 59
End: 2024-11-26
Payer: MEDICAID

## 2024-11-26 PROCEDURE — 99205 OFFICE O/P NEW HI 60 MIN: CPT

## 2024-11-26 PROCEDURE — 92083 EXTENDED VISUAL FIELD XM: CPT

## 2024-11-26 PROCEDURE — 92285 EXTERNAL OCULAR PHOTOGRAPHY: CPT

## 2024-12-02 ENCOUNTER — APPOINTMENT (OUTPATIENT)
Dept: OPHTHALMOLOGY | Facility: CLINIC | Age: 59
End: 2024-12-02
Payer: MEDICAID

## 2024-12-02 ENCOUNTER — NON-APPOINTMENT (OUTPATIENT)
Age: 59
End: 2024-12-02

## 2024-12-02 PROCEDURE — 92134 CPTRZ OPH DX IMG PST SGM RTA: CPT

## 2024-12-02 PROCEDURE — 92012 INTRM OPH EXAM EST PATIENT: CPT

## 2025-01-15 ENCOUNTER — APPOINTMENT (OUTPATIENT)
Dept: OPHTHALMOLOGY | Facility: CLINIC | Age: 60
End: 2025-01-15
Payer: MEDICAID

## 2025-01-15 ENCOUNTER — NON-APPOINTMENT (OUTPATIENT)
Age: 60
End: 2025-01-15

## 2025-01-15 PROCEDURE — 92012 INTRM OPH EXAM EST PATIENT: CPT

## 2025-01-15 PROCEDURE — 92133 CPTRZD OPH DX IMG PST SGM ON: CPT

## 2025-01-30 ENCOUNTER — NON-APPOINTMENT (OUTPATIENT)
Age: 60
End: 2025-01-30

## 2025-02-04 ENCOUNTER — APPOINTMENT (OUTPATIENT)
Dept: OPHTHALMOLOGY | Facility: AMBULATORY SURGERY CENTER | Age: 60
End: 2025-02-04

## 2025-02-04 PROCEDURE — 67904 REPAIR EYELID DEFECT: CPT | Mod: LT

## 2025-02-07 ENCOUNTER — APPOINTMENT (OUTPATIENT)
Dept: OPHTHALMOLOGY | Facility: CLINIC | Age: 60
End: 2025-02-07
Payer: MEDICAID

## 2025-02-07 ENCOUNTER — NON-APPOINTMENT (OUTPATIENT)
Age: 60
End: 2025-02-07

## 2025-02-07 PROCEDURE — 99024 POSTOP FOLLOW-UP VISIT: CPT

## 2025-02-10 ENCOUNTER — NON-APPOINTMENT (OUTPATIENT)
Age: 60
End: 2025-02-10

## 2025-02-10 ENCOUNTER — APPOINTMENT (OUTPATIENT)
Dept: OPHTHALMOLOGY | Facility: CLINIC | Age: 60
End: 2025-02-10
Payer: MEDICAID

## 2025-02-10 PROCEDURE — 92012 INTRM OPH EXAM EST PATIENT: CPT

## 2025-02-10 PROCEDURE — 92134 CPTRZ OPH DX IMG PST SGM RTA: CPT

## 2025-02-10 PROCEDURE — 99024 POSTOP FOLLOW-UP VISIT: CPT

## 2025-02-10 PROCEDURE — 92201 OPSCPY EXTND RTA DRAW UNI/BI: CPT

## 2025-05-14 ENCOUNTER — APPOINTMENT (OUTPATIENT)
Dept: OPHTHALMOLOGY | Facility: CLINIC | Age: 60
End: 2025-05-14
Payer: MEDICAID

## 2025-05-14 ENCOUNTER — NON-APPOINTMENT (OUTPATIENT)
Age: 60
End: 2025-05-14

## 2025-05-14 PROCEDURE — 92083 EXTENDED VISUAL FIELD XM: CPT

## 2025-05-14 PROCEDURE — 92012 INTRM OPH EXAM EST PATIENT: CPT

## 2025-05-16 ENCOUNTER — APPOINTMENT (OUTPATIENT)
Dept: NEUROLOGY | Facility: CLINIC | Age: 60
End: 2025-05-16
Payer: MEDICAID

## 2025-05-16 PROCEDURE — 93880 EXTRACRANIAL BILAT STUDY: CPT

## 2025-05-16 PROCEDURE — 93888 INTRACRANIAL LIMITED STUDY: CPT

## 2025-05-17 NOTE — DISCHARGE NOTE PROVIDER - HOSPITAL COURSE
54 year old female, history of hypertension, hyperlipidemia, Type 2 DM, CVA 2017 with no residual deficits, s/p loop recorder with reportedly found no atrial fibrillation, found to have RMCA stenosis on imaging and angiogram 2/24. Patient was admitted on 2/24 under Neurosurgery for an elective s/p right craniotomy for right STA to MCA bypass with Dr. Faria. Patient tolerated procedure well with no issues and was under PACU / NSCU for post-operative care and management. She had a post-op CTA Head and Neck which was stable and a non-contrast CT Brain which was stable. Patient was placed on Aspirin 81 for post-op bypass and is instructed to continue the medication. She continued to improve and was transitioned to [facility] on 2/26. Patient was evaluated by Physical Therapy and was deemed an appropriate candidate for home with no skilled needs. On 2/27 patient is neurologically and hemodynamically stable for discharge. Yes, get tested

## 2025-05-19 ENCOUNTER — APPOINTMENT (OUTPATIENT)
Dept: NEUROLOGY | Facility: CLINIC | Age: 60
End: 2025-05-19
Payer: MEDICAID

## 2025-05-19 VITALS
HEART RATE: 65 BPM | HEIGHT: 66 IN | BODY MASS INDEX: 31.34 KG/M2 | WEIGHT: 195 LBS | SYSTOLIC BLOOD PRESSURE: 127 MMHG | DIASTOLIC BLOOD PRESSURE: 77 MMHG

## 2025-05-19 DIAGNOSIS — I63.411 CEREBRAL INFARCTION DUE TO EMBOLISM OF RIGHT MIDDLE CEREBRAL ARTERY: ICD-10-CM

## 2025-05-19 PROCEDURE — G2211 COMPLEX E/M VISIT ADD ON: CPT | Mod: NC

## 2025-05-19 PROCEDURE — 99213 OFFICE O/P EST LOW 20 MIN: CPT

## 2025-05-23 ENCOUNTER — NON-APPOINTMENT (OUTPATIENT)
Age: 60
End: 2025-05-23

## 2025-05-23 ENCOUNTER — APPOINTMENT (OUTPATIENT)
Dept: OPHTHALMOLOGY | Facility: CLINIC | Age: 60
End: 2025-05-23
Payer: MEDICAID

## 2025-05-23 PROCEDURE — 99213 OFFICE O/P EST LOW 20 MIN: CPT

## 2025-05-23 PROCEDURE — 92285 EXTERNAL OCULAR PHOTOGRAPHY: CPT

## 2025-08-06 ENCOUNTER — APPOINTMENT (OUTPATIENT)
Dept: OPHTHALMOLOGY | Facility: CLINIC | Age: 60
End: 2025-08-06
Payer: MEDICAID

## 2025-08-06 ENCOUNTER — NON-APPOINTMENT (OUTPATIENT)
Age: 60
End: 2025-08-06

## 2025-08-06 PROCEDURE — 92250 FUNDUS PHOTOGRAPHY W/I&R: CPT

## 2025-08-06 PROCEDURE — 92012 INTRM OPH EXAM EST PATIENT: CPT

## 2025-09-13 ENCOUNTER — NON-APPOINTMENT (OUTPATIENT)
Age: 60
End: 2025-09-13

## 2025-09-15 ENCOUNTER — NON-APPOINTMENT (OUTPATIENT)
Age: 60
End: 2025-09-15

## 2025-09-15 ENCOUNTER — APPOINTMENT (OUTPATIENT)
Dept: OPHTHALMOLOGY | Facility: CLINIC | Age: 60
End: 2025-09-15
Payer: MEDICAID

## 2025-09-15 PROCEDURE — 92083 EXTENDED VISUAL FIELD XM: CPT

## 2025-09-15 PROCEDURE — 92012 INTRM OPH EXAM EST PATIENT: CPT
